# Patient Record
Sex: MALE | Race: WHITE | Employment: UNEMPLOYED | URBAN - METROPOLITAN AREA
[De-identification: names, ages, dates, MRNs, and addresses within clinical notes are randomized per-mention and may not be internally consistent; named-entity substitution may affect disease eponyms.]

---

## 2018-08-21 ENCOUNTER — OFFICE VISIT (OUTPATIENT)
Dept: FAMILY MEDICINE CLINIC | Age: 58
End: 2018-08-21
Payer: COMMERCIAL

## 2018-08-21 VITALS
RESPIRATION RATE: 16 BRPM | WEIGHT: 254 LBS | HEART RATE: 68 BPM | OXYGEN SATURATION: 95 % | TEMPERATURE: 98.1 F | HEIGHT: 71 IN | DIASTOLIC BLOOD PRESSURE: 62 MMHG | BODY MASS INDEX: 35.56 KG/M2 | SYSTOLIC BLOOD PRESSURE: 126 MMHG

## 2018-08-21 DIAGNOSIS — R03.0 ELEVATED BLOOD PRESSURE READING: Primary | ICD-10-CM

## 2018-08-21 DIAGNOSIS — Z12.11 SCREENING FOR COLON CANCER: ICD-10-CM

## 2018-08-21 DIAGNOSIS — E66.09 CLASS 2 OBESITY DUE TO EXCESS CALORIES WITHOUT SERIOUS COMORBIDITY WITH BODY MASS INDEX (BMI) OF 35.0 TO 35.9 IN ADULT: ICD-10-CM

## 2018-08-21 DIAGNOSIS — Z13.0 SCREENING FOR BLOOD DISEASE: ICD-10-CM

## 2018-08-21 DIAGNOSIS — K64.9 HEMORRHOIDS, UNSPECIFIED HEMORRHOID TYPE: ICD-10-CM

## 2018-08-21 PROCEDURE — 3017F COLORECTAL CA SCREEN DOC REV: CPT | Performed by: INTERNAL MEDICINE

## 2018-08-21 PROCEDURE — G8427 DOCREV CUR MEDS BY ELIG CLIN: HCPCS | Performed by: INTERNAL MEDICINE

## 2018-08-21 PROCEDURE — G8417 CALC BMI ABV UP PARAM F/U: HCPCS | Performed by: INTERNAL MEDICINE

## 2018-08-21 PROCEDURE — 1036F TOBACCO NON-USER: CPT | Performed by: INTERNAL MEDICINE

## 2018-08-21 PROCEDURE — 99203 OFFICE O/P NEW LOW 30 MIN: CPT | Performed by: INTERNAL MEDICINE

## 2018-08-21 ASSESSMENT — PATIENT HEALTH QUESTIONNAIRE - PHQ9
SUM OF ALL RESPONSES TO PHQ QUESTIONS 1-9: 0
2. FEELING DOWN, DEPRESSED OR HOPELESS: 0
1. LITTLE INTEREST OR PLEASURE IN DOING THINGS: 0
SUM OF ALL RESPONSES TO PHQ QUESTIONS 1-9: 0
SUM OF ALL RESPONSES TO PHQ9 QUESTIONS 1 & 2: 0

## 2018-08-21 ASSESSMENT — ENCOUNTER SYMPTOMS
ABDOMINAL PAIN: 0
BACK PAIN: 0
EYE PAIN: 0
SHORTNESS OF BREATH: 0

## 2018-08-25 DIAGNOSIS — Z13.0 SCREENING FOR BLOOD DISEASE: ICD-10-CM

## 2018-08-25 DIAGNOSIS — E66.09 CLASS 2 OBESITY DUE TO EXCESS CALORIES WITHOUT SERIOUS COMORBIDITY WITH BODY MASS INDEX (BMI) OF 35.0 TO 35.9 IN ADULT: ICD-10-CM

## 2018-08-25 DIAGNOSIS — K64.9 HEMORRHOIDS, UNSPECIFIED HEMORRHOID TYPE: ICD-10-CM

## 2018-08-25 DIAGNOSIS — R03.0 ELEVATED BLOOD PRESSURE READING: ICD-10-CM

## 2018-08-25 LAB
ALBUMIN SERPL-MCNC: 4.2 G/DL (ref 3.9–4.9)
ALP BLD-CCNC: 82 U/L (ref 35–104)
ALT SERPL-CCNC: 26 U/L (ref 0–41)
ANION GAP SERPL CALCULATED.3IONS-SCNC: 14 MEQ/L (ref 7–13)
AST SERPL-CCNC: 27 U/L (ref 0–40)
BILIRUB SERPL-MCNC: 0.4 MG/DL (ref 0–1.2)
BUN BLDV-MCNC: 12 MG/DL (ref 6–20)
CALCIUM SERPL-MCNC: 9 MG/DL (ref 8.6–10.2)
CHLORIDE BLD-SCNC: 103 MEQ/L (ref 98–107)
CHOLESTEROL, TOTAL: 265 MG/DL (ref 0–199)
CO2: 24 MEQ/L (ref 22–29)
CREAT SERPL-MCNC: 0.98 MG/DL (ref 0.7–1.2)
GFR AFRICAN AMERICAN: >60
GFR NON-AFRICAN AMERICAN: >60
GLOBULIN: 2.6 G/DL (ref 2.3–3.5)
GLUCOSE BLD-MCNC: 105 MG/DL (ref 74–109)
HBA1C MFR BLD: 5.6 % (ref 4.8–5.9)
HCT VFR BLD CALC: 42.9 % (ref 42–52)
HDLC SERPL-MCNC: 44 MG/DL (ref 40–59)
HEMOGLOBIN: 14.5 G/DL (ref 14–18)
HEPATITIS C ANTIBODY INTERPRETATION: NORMAL
LDL CHOLESTEROL CALCULATED: 186 MG/DL (ref 0–129)
MCH RBC QN AUTO: 29.5 PG (ref 27–31.3)
MCHC RBC AUTO-ENTMCNC: 33.7 % (ref 33–37)
MCV RBC AUTO: 87.6 FL (ref 80–100)
PDW BLD-RTO: 14.7 % (ref 11.5–14.5)
PLATELET # BLD: 242 K/UL (ref 130–400)
POTASSIUM SERPL-SCNC: 4 MEQ/L (ref 3.5–5.1)
RBC # BLD: 4.9 M/UL (ref 4.7–6.1)
SODIUM BLD-SCNC: 141 MEQ/L (ref 132–144)
TOTAL PROTEIN: 6.8 G/DL (ref 6.4–8.1)
TRIGL SERPL-MCNC: 176 MG/DL (ref 0–200)
WBC # BLD: 5.2 K/UL (ref 4.8–10.8)

## 2021-01-01 ENCOUNTER — APPOINTMENT (OUTPATIENT)
Dept: GENERAL RADIOLOGY | Age: 61
DRG: 870 | End: 2021-01-01
Payer: COMMERCIAL

## 2021-01-01 ENCOUNTER — APPOINTMENT (OUTPATIENT)
Dept: CT IMAGING | Age: 61
DRG: 870 | End: 2021-01-01
Payer: COMMERCIAL

## 2021-01-01 ENCOUNTER — APPOINTMENT (OUTPATIENT)
Dept: ULTRASOUND IMAGING | Age: 61
DRG: 870 | End: 2021-01-01
Payer: COMMERCIAL

## 2021-01-01 ENCOUNTER — HOSPITAL ENCOUNTER (INPATIENT)
Age: 61
LOS: 11 days | DRG: 870 | End: 2022-01-02
Attending: INTERNAL MEDICINE | Admitting: INTERNAL MEDICINE
Payer: COMMERCIAL

## 2021-01-01 ENCOUNTER — APPOINTMENT (OUTPATIENT)
Dept: INTERVENTIONAL RADIOLOGY/VASCULAR | Age: 61
DRG: 870 | End: 2021-01-01
Payer: COMMERCIAL

## 2021-01-01 DIAGNOSIS — U07.1 ACUTE HYPOXEMIC RESPIRATORY FAILURE DUE TO COVID-19 (HCC): Primary | ICD-10-CM

## 2021-01-01 DIAGNOSIS — J96.01 ACUTE HYPOXEMIC RESPIRATORY FAILURE DUE TO COVID-19 (HCC): Primary | ICD-10-CM

## 2021-01-01 LAB
ALBUMIN SERPL-MCNC: 2.6 G/DL (ref 3.5–4.6)
ALBUMIN SERPL-MCNC: 2.7 G/DL (ref 3.5–4.6)
ALBUMIN SERPL-MCNC: 2.7 G/DL (ref 3.5–4.6)
ALBUMIN SERPL-MCNC: 2.8 G/DL (ref 3.5–4.6)
ALBUMIN SERPL-MCNC: 2.9 G/DL (ref 3.5–4.6)
ALBUMIN SERPL-MCNC: 3.1 G/DL (ref 3.5–4.6)
ALP BLD-CCNC: 115 U/L (ref 35–104)
ALP BLD-CCNC: 120 U/L (ref 35–104)
ALP BLD-CCNC: 125 U/L (ref 35–104)
ALP BLD-CCNC: 127 U/L (ref 35–104)
ALP BLD-CCNC: 139 U/L (ref 35–104)
ALP BLD-CCNC: 146 U/L (ref 35–104)
ALP BLD-CCNC: 70 U/L (ref 35–104)
ALP BLD-CCNC: 81 U/L (ref 35–104)
ALP BLD-CCNC: 85 U/L (ref 35–104)
ALP BLD-CCNC: 86 U/L (ref 35–104)
ALT SERPL-CCNC: 105 U/L (ref 0–41)
ALT SERPL-CCNC: 131 U/L (ref 0–41)
ALT SERPL-CCNC: 140 U/L (ref 0–41)
ALT SERPL-CCNC: 159 U/L (ref 0–41)
ALT SERPL-CCNC: 166 U/L (ref 0–41)
ALT SERPL-CCNC: 34 U/L (ref 0–41)
ALT SERPL-CCNC: 41 U/L (ref 0–41)
ALT SERPL-CCNC: 49 U/L (ref 0–41)
ALT SERPL-CCNC: 56 U/L (ref 0–41)
ALT SERPL-CCNC: 71 U/L (ref 0–41)
ANION GAP SERPL CALCULATED.3IONS-SCNC: 15 MEQ/L (ref 9–15)
ANION GAP SERPL CALCULATED.3IONS-SCNC: 17 MEQ/L (ref 9–15)
ANION GAP SERPL CALCULATED.3IONS-SCNC: 20 MEQ/L (ref 9–15)
ANION GAP SERPL CALCULATED.3IONS-SCNC: 20 MEQ/L (ref 9–15)
ANION GAP SERPL CALCULATED.3IONS-SCNC: 21 MEQ/L (ref 9–15)
ANION GAP SERPL CALCULATED.3IONS-SCNC: 24 MEQ/L (ref 9–15)
ANION GAP SERPL CALCULATED.3IONS-SCNC: 25 MEQ/L (ref 9–15)
ANION GAP SERPL CALCULATED.3IONS-SCNC: 26 MEQ/L (ref 9–15)
ANION GAP SERPL CALCULATED.3IONS-SCNC: 26 MEQ/L (ref 9–15)
ANION GAP SERPL CALCULATED.3IONS-SCNC: 27 MEQ/L (ref 9–15)
ANION GAP SERPL CALCULATED.3IONS-SCNC: 27 MEQ/L (ref 9–15)
ANISOCYTOSIS: ABNORMAL
ANTI-XA UNFRAC HEPARIN: 0.19 IU/ML
ANTI-XA UNFRAC HEPARIN: 0.21 IU/ML
ANTI-XA UNFRAC HEPARIN: 0.22 IU/ML
ANTI-XA UNFRAC HEPARIN: 0.27 IU/ML
ANTI-XA UNFRAC HEPARIN: 0.31 IU/ML
ANTI-XA UNFRAC HEPARIN: 0.4 IU/ML
ANTI-XA UNFRAC HEPARIN: 0.55 IU/ML
ANTI-XA UNFRAC HEPARIN: 0.59 IU/ML
APTT: 32.2 SEC (ref 24.4–36.8)
APTT: 63.1 SEC (ref 24.4–36.8)
AST SERPL-CCNC: 107 U/L (ref 0–40)
AST SERPL-CCNC: 157 U/L (ref 0–40)
AST SERPL-CCNC: 208 U/L (ref 0–40)
AST SERPL-CCNC: 242 U/L (ref 0–40)
AST SERPL-CCNC: 366 U/L (ref 0–40)
AST SERPL-CCNC: 38 U/L (ref 0–40)
AST SERPL-CCNC: 44 U/L (ref 0–40)
AST SERPL-CCNC: 473 U/L (ref 0–40)
AST SERPL-CCNC: 56 U/L (ref 0–40)
AST SERPL-CCNC: <5 U/L (ref 0–40)
BANDED NEUTROPHILS RELATIVE PERCENT: 3 %
BANDED NEUTROPHILS RELATIVE PERCENT: 4 %
BANDED NEUTROPHILS RELATIVE PERCENT: 5 %
BASE EXCESS ARTERIAL: -10 (ref -3–3)
BASE EXCESS ARTERIAL: -11 (ref -3–3)
BASE EXCESS ARTERIAL: -11 (ref -3–3)
BASE EXCESS ARTERIAL: -2 (ref -3–3)
BASE EXCESS ARTERIAL: -3 (ref -3–3)
BASE EXCESS ARTERIAL: -4 (ref -3–3)
BASE EXCESS ARTERIAL: -4 (ref -3–3)
BASE EXCESS ARTERIAL: -5 (ref -3–3)
BASE EXCESS ARTERIAL: -6 (ref -3–3)
BASE EXCESS ARTERIAL: -7 (ref -3–3)
BASE EXCESS ARTERIAL: -8 (ref -3–3)
BASE EXCESS ARTERIAL: -9 (ref -3–3)
BASOPHILS ABSOLUTE: 0 K/UL (ref 0–0.2)
BASOPHILS ABSOLUTE: 0.1 K/UL (ref 0–0.2)
BASOPHILS RELATIVE PERCENT: 0.1 %
BASOPHILS RELATIVE PERCENT: 0.2 %
BASOPHILS RELATIVE PERCENT: 0.3 %
BASOPHILS RELATIVE PERCENT: 0.3 %
BASOPHILS RELATIVE PERCENT: 0.5 %
BILIRUB SERPL-MCNC: 0.3 MG/DL (ref 0.2–0.7)
BILIRUB SERPL-MCNC: 0.4 MG/DL (ref 0.2–0.7)
BILIRUB SERPL-MCNC: 0.5 MG/DL (ref 0.2–0.7)
BILIRUB SERPL-MCNC: <0.2 MG/DL (ref 0.2–0.7)
BLOOD CULTURE, ROUTINE: NORMAL
BLOOD CULTURE, ROUTINE: NORMAL
BUN BLDV-MCNC: 110 MG/DL (ref 8–23)
BUN BLDV-MCNC: 140 MG/DL (ref 8–23)
BUN BLDV-MCNC: 150 MG/DL (ref 8–23)
BUN BLDV-MCNC: 29 MG/DL (ref 8–23)
BUN BLDV-MCNC: 44 MG/DL (ref 8–23)
BUN BLDV-MCNC: 46 MG/DL (ref 8–23)
BUN BLDV-MCNC: 71 MG/DL (ref 8–23)
BUN BLDV-MCNC: 77 MG/DL (ref 8–23)
BUN BLDV-MCNC: 78 MG/DL (ref 8–23)
BUN BLDV-MCNC: 90 MG/DL (ref 8–23)
BUN BLDV-MCNC: 95 MG/DL (ref 8–23)
C-REACTIVE PROTEIN: 145.8 MG/L (ref 0–5)
CALCIUM IONIZED: 0.79 MMOL/L (ref 1.12–1.32)
CALCIUM IONIZED: 0.82 MMOL/L (ref 1.12–1.32)
CALCIUM IONIZED: 0.83 MMOL/L (ref 1.12–1.32)
CALCIUM IONIZED: 0.84 MMOL/L (ref 1.12–1.32)
CALCIUM IONIZED: 0.84 MMOL/L (ref 1.12–1.32)
CALCIUM IONIZED: 0.86 MMOL/L (ref 1.12–1.32)
CALCIUM IONIZED: 0.87 MMOL/L (ref 1.12–1.32)
CALCIUM IONIZED: 0.88 MMOL/L (ref 1.12–1.32)
CALCIUM IONIZED: 0.88 MMOL/L (ref 1.12–1.32)
CALCIUM IONIZED: 0.89 MMOL/L (ref 1.12–1.32)
CALCIUM IONIZED: 0.89 MMOL/L (ref 1.12–1.32)
CALCIUM IONIZED: 0.9 MMOL/L (ref 1.12–1.32)
CALCIUM IONIZED: 0.9 MMOL/L (ref 1.12–1.32)
CALCIUM IONIZED: 0.91 MMOL/L (ref 1.12–1.32)
CALCIUM IONIZED: 0.93 MMOL/L (ref 1.12–1.32)
CALCIUM IONIZED: 0.95 MMOL/L (ref 1.12–1.32)
CALCIUM IONIZED: 0.95 MMOL/L (ref 1.12–1.32)
CALCIUM IONIZED: 0.96 MMOL/L (ref 1.12–1.32)
CALCIUM IONIZED: 0.96 MMOL/L (ref 1.12–1.32)
CALCIUM IONIZED: 0.97 MMOL/L (ref 1.12–1.32)
CALCIUM IONIZED: 0.98 MMOL/L (ref 1.12–1.32)
CALCIUM IONIZED: 0.99 MMOL/L (ref 1.12–1.32)
CALCIUM IONIZED: 1.02 MMOL/L (ref 1.12–1.32)
CALCIUM IONIZED: 1.05 MMOL/L (ref 1.12–1.32)
CALCIUM IONIZED: 1.07 MMOL/L (ref 1.12–1.32)
CALCIUM IONIZED: 1.07 MMOL/L (ref 1.12–1.32)
CALCIUM IONIZED: 1.11 MMOL/L (ref 1.12–1.32)
CALCIUM SERPL-MCNC: 6.5 MG/DL (ref 8.5–9.9)
CALCIUM SERPL-MCNC: 6.6 MG/DL (ref 8.5–9.9)
CALCIUM SERPL-MCNC: 6.7 MG/DL (ref 8.5–9.9)
CALCIUM SERPL-MCNC: 7.2 MG/DL (ref 8.5–9.9)
CALCIUM SERPL-MCNC: 7.4 MG/DL (ref 8.5–9.9)
CALCIUM SERPL-MCNC: 7.8 MG/DL (ref 8.5–9.9)
CALCIUM SERPL-MCNC: 7.9 MG/DL (ref 8.5–9.9)
CALCIUM SERPL-MCNC: 8.1 MG/DL (ref 8.5–9.9)
CALCIUM SERPL-MCNC: 8.4 MG/DL (ref 8.5–9.9)
CHLORIDE BLD-SCNC: 101 MEQ/L (ref 95–107)
CHLORIDE BLD-SCNC: 102 MEQ/L (ref 95–107)
CHLORIDE BLD-SCNC: 103 MEQ/L (ref 95–107)
CHLORIDE BLD-SCNC: 89 MEQ/L (ref 95–107)
CHLORIDE BLD-SCNC: 90 MEQ/L (ref 95–107)
CHLORIDE BLD-SCNC: 91 MEQ/L (ref 95–107)
CHLORIDE BLD-SCNC: 92 MEQ/L (ref 95–107)
CHLORIDE BLD-SCNC: 95 MEQ/L (ref 95–107)
CHLORIDE BLD-SCNC: 95 MEQ/L (ref 95–107)
CHLORIDE BLD-SCNC: 96 MEQ/L (ref 95–107)
CHLORIDE BLD-SCNC: 96 MEQ/L (ref 95–107)
CO2: 13 MEQ/L (ref 20–31)
CO2: 15 MEQ/L (ref 20–31)
CO2: 16 MEQ/L (ref 20–31)
CO2: 17 MEQ/L (ref 20–31)
CO2: 18 MEQ/L (ref 20–31)
CO2: 19 MEQ/L (ref 20–31)
CO2: 20 MEQ/L (ref 20–31)
CO2: 21 MEQ/L (ref 20–31)
CREAT SERPL-MCNC: 10.12 MG/DL (ref 0.7–1.2)
CREAT SERPL-MCNC: 11.03 MG/DL (ref 0.7–1.2)
CREAT SERPL-MCNC: 2.09 MG/DL (ref 0.7–1.2)
CREAT SERPL-MCNC: 2.96 MG/DL (ref 0.7–1.2)
CREAT SERPL-MCNC: 3.14 MG/DL (ref 0.7–1.2)
CREAT SERPL-MCNC: 5.21 MG/DL (ref 0.7–1.2)
CREAT SERPL-MCNC: 6.2 MG/DL (ref 0.7–1.2)
CREAT SERPL-MCNC: 6.6 MG/DL (ref 0.7–1.2)
CREAT SERPL-MCNC: 8.03 MG/DL (ref 0.7–1.2)
CREAT SERPL-MCNC: 8.24 MG/DL (ref 0.7–1.2)
CREAT SERPL-MCNC: 9.76 MG/DL (ref 0.7–1.2)
CULTURE, BLOOD 2: NORMAL
CULTURE, BLOOD 2: NORMAL
D DIMER: 10.1 MG/L FEU (ref 0–0.5)
D DIMER: 10.67 MG/L FEU (ref 0–0.5)
D DIMER: 10.75 MG/L FEU (ref 0–0.5)
D DIMER: 12.48 MG/L FEU (ref 0–0.5)
D DIMER: 2.41 MG/L FEU (ref 0–0.5)
D DIMER: 3.07 MG/L FEU (ref 0–0.5)
D DIMER: 3.62 MG/L FEU (ref 0–0.5)
D DIMER: 3.73 MG/L FEU (ref 0–0.5)
D DIMER: 8.51 MG/L FEU (ref 0–0.5)
EKG ATRIAL RATE: 113 BPM
EKG ATRIAL RATE: 75 BPM
EKG P AXIS: 38 DEGREES
EKG P AXIS: 46 DEGREES
EKG P-R INTERVAL: 180 MS
EKG P-R INTERVAL: 200 MS
EKG Q-T INTERVAL: 316 MS
EKG Q-T INTERVAL: 412 MS
EKG QRS DURATION: 80 MS
EKG QRS DURATION: 90 MS
EKG QTC CALCULATION (BAZETT): 433 MS
EKG QTC CALCULATION (BAZETT): 460 MS
EKG R AXIS: 33 DEGREES
EKG R AXIS: 71 DEGREES
EKG T AXIS: 103 DEGREES
EKG T AXIS: 31 DEGREES
EKG VENTRICULAR RATE: 113 BPM
EKG VENTRICULAR RATE: 75 BPM
EOSINOPHILS ABSOLUTE: 0 K/UL (ref 0–0.7)
EOSINOPHILS RELATIVE PERCENT: 0 %
EOSINOPHILS RELATIVE PERCENT: 0.1 %
EOSINOPHILS RELATIVE PERCENT: 0.2 %
FERRITIN: 1358 UG/L (ref 30–400)
FERRITIN: 1783 UG/L (ref 30–400)
FERRITIN: 2454 UG/L (ref 30–400)
FERRITIN: 4229 UG/L (ref 30–400)
FERRITIN: 6689 UG/L (ref 30–400)
FERRITIN: 679 UG/L (ref 30–400)
FERRITIN: ABNORMAL UG/L (ref 30–400)
GFR AFRICAN AMERICAN: 10
GFR AFRICAN AMERICAN: 10.4
GFR AFRICAN AMERICAN: 11
GFR AFRICAN AMERICAN: 11
GFR AFRICAN AMERICAN: 11.2
GFR AFRICAN AMERICAN: 13
GFR AFRICAN AMERICAN: 13.7
GFR AFRICAN AMERICAN: 17
GFR AFRICAN AMERICAN: 20
GFR AFRICAN AMERICAN: 24.5
GFR AFRICAN AMERICAN: 26.3
GFR AFRICAN AMERICAN: 28
GFR AFRICAN AMERICAN: 33
GFR AFRICAN AMERICAN: 39.2
GFR AFRICAN AMERICAN: 44
GFR AFRICAN AMERICAN: 5
GFR AFRICAN AMERICAN: 5.8
GFR AFRICAN AMERICAN: 6
GFR AFRICAN AMERICAN: 6.4
GFR AFRICAN AMERICAN: 6.6
GFR AFRICAN AMERICAN: 7
GFR AFRICAN AMERICAN: 8.1
GFR AFRICAN AMERICAN: 8.3
GFR AFRICAN AMERICAN: 9
GFR NON-AFRICAN AMERICAN: 11
GFR NON-AFRICAN AMERICAN: 11.3
GFR NON-AFRICAN AMERICAN: 14
GFR NON-AFRICAN AMERICAN: 17
GFR NON-AFRICAN AMERICAN: 20.3
GFR NON-AFRICAN AMERICAN: 21.7
GFR NON-AFRICAN AMERICAN: 23
GFR NON-AFRICAN AMERICAN: 28
GFR NON-AFRICAN AMERICAN: 32.4
GFR NON-AFRICAN AMERICAN: 36
GFR NON-AFRICAN AMERICAN: 4
GFR NON-AFRICAN AMERICAN: 4.8
GFR NON-AFRICAN AMERICAN: 5
GFR NON-AFRICAN AMERICAN: 5.3
GFR NON-AFRICAN AMERICAN: 5.5
GFR NON-AFRICAN AMERICAN: 6
GFR NON-AFRICAN AMERICAN: 6.7
GFR NON-AFRICAN AMERICAN: 6.9
GFR NON-AFRICAN AMERICAN: 8
GFR NON-AFRICAN AMERICAN: 8.6
GFR NON-AFRICAN AMERICAN: 9
GFR NON-AFRICAN AMERICAN: 9.2
GLOBULIN: 2.7 G/DL (ref 2.3–3.5)
GLOBULIN: 2.9 G/DL (ref 2.3–3.5)
GLOBULIN: 2.9 G/DL (ref 2.3–3.5)
GLOBULIN: 3 G/DL (ref 2.3–3.5)
GLOBULIN: 3 G/DL (ref 2.3–3.5)
GLOBULIN: 3.1 G/DL (ref 2.3–3.5)
GLOBULIN: 3.4 G/DL (ref 2.3–3.5)
GLOBULIN: 3.6 G/DL (ref 2.3–3.5)
GLOBULIN: 3.6 G/DL (ref 2.3–3.5)
GLOBULIN: 4.3 G/DL (ref 2.3–3.5)
GLUCOSE BLD-MCNC: 102 MG/DL (ref 60–115)
GLUCOSE BLD-MCNC: 102 MG/DL (ref 60–115)
GLUCOSE BLD-MCNC: 104 MG/DL (ref 60–115)
GLUCOSE BLD-MCNC: 106 MG/DL (ref 60–115)
GLUCOSE BLD-MCNC: 109 MG/DL (ref 60–115)
GLUCOSE BLD-MCNC: 112 MG/DL (ref 60–115)
GLUCOSE BLD-MCNC: 113 MG/DL (ref 60–115)
GLUCOSE BLD-MCNC: 115 MG/DL (ref 60–115)
GLUCOSE BLD-MCNC: 119 MG/DL (ref 70–99)
GLUCOSE BLD-MCNC: 120 MG/DL (ref 60–115)
GLUCOSE BLD-MCNC: 128 MG/DL (ref 60–115)
GLUCOSE BLD-MCNC: 132 MG/DL (ref 60–115)
GLUCOSE BLD-MCNC: 132 MG/DL (ref 60–115)
GLUCOSE BLD-MCNC: 133 MG/DL (ref 60–115)
GLUCOSE BLD-MCNC: 134 MG/DL (ref 60–115)
GLUCOSE BLD-MCNC: 135 MG/DL (ref 60–115)
GLUCOSE BLD-MCNC: 135 MG/DL (ref 60–115)
GLUCOSE BLD-MCNC: 135 MG/DL (ref 70–99)
GLUCOSE BLD-MCNC: 137 MG/DL (ref 60–115)
GLUCOSE BLD-MCNC: 139 MG/DL (ref 70–99)
GLUCOSE BLD-MCNC: 140 MG/DL (ref 60–115)
GLUCOSE BLD-MCNC: 143 MG/DL (ref 70–99)
GLUCOSE BLD-MCNC: 145 MG/DL (ref 60–115)
GLUCOSE BLD-MCNC: 147 MG/DL (ref 60–115)
GLUCOSE BLD-MCNC: 148 MG/DL (ref 60–115)
GLUCOSE BLD-MCNC: 152 MG/DL (ref 60–115)
GLUCOSE BLD-MCNC: 153 MG/DL (ref 60–115)
GLUCOSE BLD-MCNC: 153 MG/DL (ref 60–115)
GLUCOSE BLD-MCNC: 154 MG/DL (ref 60–115)
GLUCOSE BLD-MCNC: 156 MG/DL (ref 60–115)
GLUCOSE BLD-MCNC: 157 MG/DL (ref 60–115)
GLUCOSE BLD-MCNC: 157 MG/DL (ref 70–99)
GLUCOSE BLD-MCNC: 158 MG/DL (ref 60–115)
GLUCOSE BLD-MCNC: 159 MG/DL (ref 60–115)
GLUCOSE BLD-MCNC: 160 MG/DL (ref 60–115)
GLUCOSE BLD-MCNC: 161 MG/DL (ref 60–115)
GLUCOSE BLD-MCNC: 166 MG/DL (ref 60–115)
GLUCOSE BLD-MCNC: 167 MG/DL (ref 60–115)
GLUCOSE BLD-MCNC: 167 MG/DL (ref 60–115)
GLUCOSE BLD-MCNC: 168 MG/DL (ref 60–115)
GLUCOSE BLD-MCNC: 171 MG/DL (ref 70–99)
GLUCOSE BLD-MCNC: 174 MG/DL (ref 60–115)
GLUCOSE BLD-MCNC: 176 MG/DL (ref 60–115)
GLUCOSE BLD-MCNC: 176 MG/DL (ref 60–115)
GLUCOSE BLD-MCNC: 177 MG/DL (ref 60–115)
GLUCOSE BLD-MCNC: 177 MG/DL (ref 70–99)
GLUCOSE BLD-MCNC: 180 MG/DL (ref 60–115)
GLUCOSE BLD-MCNC: 181 MG/DL (ref 60–115)
GLUCOSE BLD-MCNC: 183 MG/DL (ref 70–99)
GLUCOSE BLD-MCNC: 184 MG/DL (ref 60–115)
GLUCOSE BLD-MCNC: 186 MG/DL (ref 60–115)
GLUCOSE BLD-MCNC: 187 MG/DL (ref 60–115)
GLUCOSE BLD-MCNC: 192 MG/DL (ref 60–115)
GLUCOSE BLD-MCNC: 193 MG/DL (ref 60–115)
GLUCOSE BLD-MCNC: 203 MG/DL (ref 70–99)
GLUCOSE BLD-MCNC: 210 MG/DL (ref 60–115)
GLUCOSE BLD-MCNC: 219 MG/DL (ref 60–115)
GLUCOSE BLD-MCNC: 221 MG/DL (ref 60–115)
GLUCOSE BLD-MCNC: 221 MG/DL (ref 60–115)
GLUCOSE BLD-MCNC: 223 MG/DL (ref 60–115)
GLUCOSE BLD-MCNC: 223 MG/DL (ref 60–115)
GLUCOSE BLD-MCNC: 223 MG/DL (ref 70–99)
GLUCOSE BLD-MCNC: 227 MG/DL (ref 70–99)
GLUCOSE BLD-MCNC: 231 MG/DL (ref 60–115)
GLUCOSE BLD-MCNC: 234 MG/DL (ref 60–115)
GLUCOSE BLD-MCNC: 242 MG/DL (ref 60–115)
GLUCOSE BLD-MCNC: 263 MG/DL (ref 60–115)
GLUCOSE BLD-MCNC: 91 MG/DL (ref 60–115)
HAV IGM SER IA-ACNC: NONREACTIVE
HBA1C MFR BLD: 6.4 % (ref 4.8–5.9)
HCO3 ARTERIAL: 17.3 MMOL/L (ref 21–29)
HCO3 ARTERIAL: 18 MMOL/L (ref 21–29)
HCO3 ARTERIAL: 18.2 MMOL/L (ref 21–29)
HCO3 ARTERIAL: 18.3 MMOL/L (ref 21–29)
HCO3 ARTERIAL: 18.3 MMOL/L (ref 21–29)
HCO3 ARTERIAL: 19 MMOL/L (ref 21–29)
HCO3 ARTERIAL: 19.6 MMOL/L (ref 21–29)
HCO3 ARTERIAL: 19.8 MMOL/L (ref 21–29)
HCO3 ARTERIAL: 20.2 MMOL/L (ref 21–29)
HCO3 ARTERIAL: 20.3 MMOL/L (ref 21–29)
HCO3 ARTERIAL: 21 MMOL/L (ref 21–29)
HCO3 ARTERIAL: 21.2 MMOL/L (ref 21–29)
HCO3 ARTERIAL: 21.3 MMOL/L (ref 21–29)
HCO3 ARTERIAL: 21.4 MMOL/L (ref 21–29)
HCO3 ARTERIAL: 22 MMOL/L (ref 21–29)
HCO3 ARTERIAL: 22.1 MMOL/L (ref 21–29)
HCO3 ARTERIAL: 22.2 MMOL/L (ref 21–29)
HCO3 ARTERIAL: 22.4 MMOL/L (ref 21–29)
HCO3 ARTERIAL: 23.5 MMOL/L (ref 21–29)
HCO3 ARTERIAL: 23.6 MMOL/L (ref 21–29)
HCO3 ARTERIAL: 23.8 MMOL/L (ref 21–29)
HCO3 ARTERIAL: 24.7 MMOL/L (ref 21–29)
HCO3 ARTERIAL: 24.9 MMOL/L (ref 21–29)
HCO3 ARTERIAL: 25.6 MMOL/L (ref 21–29)
HCT VFR BLD CALC: 25.5 % (ref 42–52)
HCT VFR BLD CALC: 26 % (ref 42–52)
HCT VFR BLD CALC: 30.3 % (ref 42–52)
HCT VFR BLD CALC: 31.9 % (ref 42–52)
HCT VFR BLD CALC: 32.1 % (ref 42–52)
HCT VFR BLD CALC: 33.7 % (ref 42–52)
HCT VFR BLD CALC: 34.9 % (ref 42–52)
HCT VFR BLD CALC: 36.2 % (ref 42–52)
HCT VFR BLD CALC: 37.6 % (ref 42–52)
HCT VFR BLD CALC: 38.6 % (ref 42–52)
HCT VFR BLD CALC: 38.6 % (ref 42–52)
HCT VFR BLD CALC: 43.6 % (ref 42–52)
HEMOGLOBIN: 10.5 GM/DL (ref 13.5–17.5)
HEMOGLOBIN: 10.6 G/DL (ref 14–18)
HEMOGLOBIN: 10.7 GM/DL (ref 13.5–17.5)
HEMOGLOBIN: 10.7 GM/DL (ref 13.5–17.5)
HEMOGLOBIN: 10.9 GM/DL (ref 13.5–17.5)
HEMOGLOBIN: 11 GM/DL (ref 13.5–17.5)
HEMOGLOBIN: 11.1 G/DL (ref 14–18)
HEMOGLOBIN: 11.3 G/DL (ref 14–18)
HEMOGLOBIN: 11.3 GM/DL (ref 13.5–17.5)
HEMOGLOBIN: 11.4 G/DL (ref 14–18)
HEMOGLOBIN: 11.4 GM/DL (ref 13.5–17.5)
HEMOGLOBIN: 11.4 GM/DL (ref 13.5–17.5)
HEMOGLOBIN: 11.6 GM/DL (ref 13.5–17.5)
HEMOGLOBIN: 11.7 GM/DL (ref 13.5–17.5)
HEMOGLOBIN: 11.8 GM/DL (ref 13.5–17.5)
HEMOGLOBIN: 12.4 G/DL (ref 14–18)
HEMOGLOBIN: 12.4 GM/DL (ref 13.5–17.5)
HEMOGLOBIN: 12.5 G/DL (ref 14–18)
HEMOGLOBIN: 12.6 G/DL (ref 14–18)
HEMOGLOBIN: 12.6 G/DL (ref 14–18)
HEMOGLOBIN: 12.8 G/DL (ref 14–18)
HEMOGLOBIN: 13.1 GM/DL (ref 13.5–17.5)
HEMOGLOBIN: 13.2 GM/DL (ref 13.5–17.5)
HEMOGLOBIN: 13.4 GM/DL (ref 13.5–17.5)
HEMOGLOBIN: 13.5 GM/DL (ref 13.5–17.5)
HEMOGLOBIN: 14 GM/DL (ref 13.5–17.5)
HEMOGLOBIN: 14.1 G/DL (ref 14–18)
HEMOGLOBIN: 14.2 GM/DL (ref 13.5–17.5)
HEMOGLOBIN: 15.3 GM/DL (ref 13.5–17.5)
HEMOGLOBIN: 7.8 GM/DL (ref 13.5–17.5)
HEMOGLOBIN: 8.1 GM/DL (ref 13.5–17.5)
HEMOGLOBIN: 8.2 GM/DL (ref 13.5–17.5)
HEMOGLOBIN: 8.3 GM/DL (ref 13.5–17.5)
HEMOGLOBIN: 8.6 G/DL (ref 14–18)
HEMOGLOBIN: 8.7 G/DL (ref 14–18)
HEMOGLOBIN: 8.8 GM/DL (ref 13.5–17.5)
HEMOGLOBIN: 9 GM/DL (ref 13.5–17.5)
HEMOGLOBIN: 9.4 GM/DL (ref 13.5–17.5)
HEMOGLOBIN: 9.8 GM/DL (ref 13.5–17.5)
HEPATITIS B CORE IGM ANTIBODY: NONREACTIVE
HEPATITIS B SURFACE ANTIGEN: NONREACTIVE
HEPATITIS C ANTIBODY: NONREACTIVE
INR BLD: 1.1
INR BLD: 1.1
LACTATE: 0.71 MMOL/L (ref 0.4–2)
LACTATE: 0.73 MMOL/L (ref 0.4–2)
LACTATE: 0.76 MMOL/L (ref 0.4–2)
LACTATE: 0.76 MMOL/L (ref 0.4–2)
LACTATE: 0.78 MMOL/L (ref 0.4–2)
LACTATE: 0.79 MMOL/L (ref 0.4–2)
LACTATE: 0.84 MMOL/L (ref 0.4–2)
LACTATE: 0.85 MMOL/L (ref 0.4–2)
LACTATE: 0.9 MMOL/L (ref 0.4–2)
LACTATE: 0.91 MMOL/L (ref 0.4–2)
LACTATE: 0.92 MMOL/L (ref 0.4–2)
LACTATE: 0.93 MMOL/L (ref 0.4–2)
LACTATE: 1.02 MMOL/L (ref 0.4–2)
LACTATE: 1.02 MMOL/L (ref 0.4–2)
LACTATE: 1.05 MMOL/L (ref 0.4–2)
LACTATE: 1.07 MMOL/L (ref 0.4–2)
LACTATE: 1.11 MMOL/L (ref 0.4–2)
LACTATE: 1.26 MMOL/L (ref 0.4–2)
LACTATE: 1.26 MMOL/L (ref 0.4–2)
LACTATE: 1.29 MMOL/L (ref 0.4–2)
LACTATE: 1.41 MMOL/L (ref 0.4–2)
LACTATE: 1.45 MMOL/L (ref 0.4–2)
LACTATE: 1.55 MMOL/L (ref 0.4–2)
LACTATE: 1.8 MMOL/L (ref 0.4–2)
LACTATE: 3.08 MMOL/L (ref 0.4–2)
LACTATE: 3.63 MMOL/L (ref 0.4–2)
LACTATE: 9 MMOL/L (ref 0.4–2)
LACTIC ACID: 10.9 MMOL/L (ref 0.5–2.2)
LYMPHOCYTES ABSOLUTE: 0.1 K/UL (ref 1–4.8)
LYMPHOCYTES ABSOLUTE: 0.2 K/UL (ref 1–4.8)
LYMPHOCYTES ABSOLUTE: 0.2 K/UL (ref 1–4.8)
LYMPHOCYTES ABSOLUTE: 0.6 K/UL (ref 1–4.8)
LYMPHOCYTES ABSOLUTE: 0.6 K/UL (ref 1–4.8)
LYMPHOCYTES ABSOLUTE: 0.8 K/UL (ref 1–4.8)
LYMPHOCYTES ABSOLUTE: 0.9 K/UL (ref 1–4.8)
LYMPHOCYTES ABSOLUTE: 1 K/UL (ref 1–4.8)
LYMPHOCYTES ABSOLUTE: 1 K/UL (ref 1–4.8)
LYMPHOCYTES ABSOLUTE: 1.7 K/UL (ref 1–4.8)
LYMPHOCYTES RELATIVE PERCENT: 1 %
LYMPHOCYTES RELATIVE PERCENT: 15.5 %
LYMPHOCYTES RELATIVE PERCENT: 2.9 %
LYMPHOCYTES RELATIVE PERCENT: 3.4 %
LYMPHOCYTES RELATIVE PERCENT: 5.7 %
LYMPHOCYTES RELATIVE PERCENT: 6.1 %
LYMPHOCYTES RELATIVE PERCENT: 7.9 %
LYMPHOCYTES RELATIVE PERCENT: 9.3 %
MCH RBC QN AUTO: 27.7 PG (ref 27–31.3)
MCH RBC QN AUTO: 28 PG (ref 27–31.3)
MCH RBC QN AUTO: 28.1 PG (ref 27–31.3)
MCH RBC QN AUTO: 28.1 PG (ref 27–31.3)
MCH RBC QN AUTO: 28.3 PG (ref 27–31.3)
MCH RBC QN AUTO: 28.6 PG (ref 27–31.3)
MCH RBC QN AUTO: 29 PG (ref 27–31.3)
MCH RBC QN AUTO: 29.4 PG (ref 27–31.3)
MCH RBC QN AUTO: 29.8 PG (ref 27–31.3)
MCH RBC QN AUTO: 29.9 PG (ref 27–31.3)
MCH RBC QN AUTO: 30.4 PG (ref 27–31.3)
MCH RBC QN AUTO: 31.2 PG (ref 27–31.3)
MCHC RBC AUTO-ENTMCNC: 32.2 % (ref 33–37)
MCHC RBC AUTO-ENTMCNC: 32.5 % (ref 33–37)
MCHC RBC AUTO-ENTMCNC: 32.7 % (ref 33–37)
MCHC RBC AUTO-ENTMCNC: 32.9 % (ref 33–37)
MCHC RBC AUTO-ENTMCNC: 33 % (ref 33–37)
MCHC RBC AUTO-ENTMCNC: 33.3 % (ref 33–37)
MCHC RBC AUTO-ENTMCNC: 34 % (ref 33–37)
MCHC RBC AUTO-ENTMCNC: 34.4 % (ref 33–37)
MCHC RBC AUTO-ENTMCNC: 34.9 % (ref 33–37)
MCHC RBC AUTO-ENTMCNC: 35 % (ref 33–37)
MCHC RBC AUTO-ENTMCNC: 35.3 % (ref 33–37)
MCHC RBC AUTO-ENTMCNC: 37 % (ref 33–37)
MCV RBC AUTO: 84.2 FL (ref 80–100)
MCV RBC AUTO: 84.3 FL (ref 80–100)
MCV RBC AUTO: 84.8 FL (ref 80–100)
MCV RBC AUTO: 84.9 FL (ref 80–100)
MCV RBC AUTO: 85 FL (ref 80–100)
MCV RBC AUTO: 85.3 FL (ref 80–100)
MCV RBC AUTO: 85.4 FL (ref 80–100)
MCV RBC AUTO: 85.5 FL (ref 80–100)
MCV RBC AUTO: 86.9 FL (ref 80–100)
MCV RBC AUTO: 87.1 FL (ref 80–100)
METAMYELOCYTES RELATIVE PERCENT: 2 %
METAMYELOCYTES RELATIVE PERCENT: 3 %
METAMYELOCYTES RELATIVE PERCENT: 4 %
MONOCYTES ABSOLUTE: 0.1 K/UL (ref 0.2–0.8)
MONOCYTES ABSOLUTE: 0.4 K/UL (ref 0.2–0.8)
MONOCYTES ABSOLUTE: 0.4 K/UL (ref 0.2–0.8)
MONOCYTES ABSOLUTE: 0.5 K/UL (ref 0.2–0.8)
MONOCYTES ABSOLUTE: 0.5 K/UL (ref 0.2–0.8)
MONOCYTES ABSOLUTE: 0.6 K/UL (ref 0.2–0.8)
MONOCYTES ABSOLUTE: 0.7 K/UL (ref 0.2–0.8)
MONOCYTES ABSOLUTE: 1 K/UL (ref 0.2–0.8)
MONOCYTES RELATIVE PERCENT: 0.9 %
MONOCYTES RELATIVE PERCENT: 2.5 %
MONOCYTES RELATIVE PERCENT: 2.7 %
MONOCYTES RELATIVE PERCENT: 3.1 %
MONOCYTES RELATIVE PERCENT: 3.5 %
MONOCYTES RELATIVE PERCENT: 4.2 %
MONOCYTES RELATIVE PERCENT: 4.4 %
MONOCYTES RELATIVE PERCENT: 5.1 %
MONOCYTES RELATIVE PERCENT: 5.6 %
MONOCYTES RELATIVE PERCENT: 6.5 %
MYELOCYTE PERCENT: 1 %
MYELOCYTE PERCENT: 4 %
NEUTROPHILS ABSOLUTE: 10.6 K/UL (ref 1.4–6.5)
NEUTROPHILS ABSOLUTE: 11.4 K/UL (ref 1.4–6.5)
NEUTROPHILS ABSOLUTE: 13.8 K/UL (ref 1.4–6.5)
NEUTROPHILS ABSOLUTE: 14.6 K/UL (ref 1.4–6.5)
NEUTROPHILS ABSOLUTE: 15.8 K/UL (ref 1.4–6.5)
NEUTROPHILS ABSOLUTE: 17.1 K/UL (ref 1.4–6.5)
NEUTROPHILS ABSOLUTE: 17.2 K/UL (ref 1.4–6.5)
NEUTROPHILS ABSOLUTE: 17.9 K/UL (ref 1.4–6.5)
NEUTROPHILS ABSOLUTE: 8.4 K/UL (ref 1.4–6.5)
NEUTROPHILS ABSOLUTE: 9 K/UL (ref 1.4–6.5)
NEUTROPHILS RELATIVE PERCENT: 78.7 %
NEUTROPHILS RELATIVE PERCENT: 86.1 %
NEUTROPHILS RELATIVE PERCENT: 87 %
NEUTROPHILS RELATIVE PERCENT: 87.6 %
NEUTROPHILS RELATIVE PERCENT: 88 %
NEUTROPHILS RELATIVE PERCENT: 88 %
NEUTROPHILS RELATIVE PERCENT: 88.5 %
NEUTROPHILS RELATIVE PERCENT: 88.8 %
NEUTROPHILS RELATIVE PERCENT: 93.1 %
NEUTROPHILS RELATIVE PERCENT: 93.9 %
NUCLEATED RED BLOOD CELLS: 1 /100 WBC
NUCLEATED RED BLOOD CELLS: 3 /100 WBC
O2 SAT, ARTERIAL: 68 % (ref 93–100)
O2 SAT, ARTERIAL: 68 % (ref 93–100)
O2 SAT, ARTERIAL: 81 % (ref 93–100)
O2 SAT, ARTERIAL: 87 % (ref 93–100)
O2 SAT, ARTERIAL: 87 % (ref 93–100)
O2 SAT, ARTERIAL: 90 % (ref 93–100)
O2 SAT, ARTERIAL: 92 % (ref 93–100)
O2 SAT, ARTERIAL: 92 % (ref 93–100)
O2 SAT, ARTERIAL: 93 % (ref 93–100)
O2 SAT, ARTERIAL: 94 % (ref 93–100)
O2 SAT, ARTERIAL: 95 % (ref 93–100)
O2 SAT, ARTERIAL: 97 % (ref 93–100)
O2 SAT, ARTERIAL: 97 % (ref 93–100)
O2 SAT, ARTERIAL: 98 % (ref 93–100)
PCO2 ARTERIAL: 145 MM HG (ref 35–45)
PCO2 ARTERIAL: 36 MM HG (ref 35–45)
PCO2 ARTERIAL: 38 MM HG (ref 35–45)
PCO2 ARTERIAL: 41 MM HG (ref 35–45)
PCO2 ARTERIAL: 41 MM HG (ref 35–45)
PCO2 ARTERIAL: 43 MM HG (ref 35–45)
PCO2 ARTERIAL: 43 MM HG (ref 35–45)
PCO2 ARTERIAL: 45 MM HG (ref 35–45)
PCO2 ARTERIAL: 48 MM HG (ref 35–45)
PCO2 ARTERIAL: 49 MM HG (ref 35–45)
PCO2 ARTERIAL: 52 MM HG (ref 35–45)
PCO2 ARTERIAL: 52 MM HG (ref 35–45)
PCO2 ARTERIAL: 53 MM HG (ref 35–45)
PCO2 ARTERIAL: 55 MM HG (ref 35–45)
PCO2 ARTERIAL: 57 MM HG (ref 35–45)
PCO2 ARTERIAL: 58 MM HG (ref 35–45)
PCO2 ARTERIAL: 65 MM HG (ref 35–45)
PCO2 ARTERIAL: 71 MM HG (ref 35–45)
PCO2 ARTERIAL: 72 MM HG (ref 35–45)
PCO2 ARTERIAL: 72 MM HG (ref 35–45)
PCO2 ARTERIAL: 85 MM HG (ref 35–45)
PCO2 ARTERIAL: 94 MM HG (ref 35–45)
PCO2 ARTERIAL: 95 MM HG (ref 35–45)
PDW BLD-RTO: 15 % (ref 11.5–14.5)
PDW BLD-RTO: 15.2 % (ref 11.5–14.5)
PDW BLD-RTO: 15.3 % (ref 11.5–14.5)
PDW BLD-RTO: 15.3 % (ref 11.5–14.5)
PDW BLD-RTO: 15.4 % (ref 11.5–14.5)
PDW BLD-RTO: 15.7 % (ref 11.5–14.5)
PDW BLD-RTO: 15.8 % (ref 11.5–14.5)
PDW BLD-RTO: 15.9 % (ref 11.5–14.5)
PERFORMED ON: ABNORMAL
PERFORMED ON: NORMAL
PH ARTERIAL: 6.82 (ref 7.35–7.45)
PH ARTERIAL: 6.98 (ref 7.35–7.45)
PH ARTERIAL: 7 (ref 7.35–7.45)
PH ARTERIAL: 7.04 (ref 7.35–7.45)
PH ARTERIAL: 7.08 (ref 7.35–7.45)
PH ARTERIAL: 7.11 (ref 7.35–7.45)
PH ARTERIAL: 7.12 (ref 7.35–7.45)
PH ARTERIAL: 7.12 (ref 7.35–7.45)
PH ARTERIAL: 7.13 (ref 7.35–7.45)
PH ARTERIAL: 7.14 (ref 7.35–7.45)
PH ARTERIAL: 7.17 (ref 7.35–7.45)
PH ARTERIAL: 7.17 (ref 7.35–7.45)
PH ARTERIAL: 7.19 (ref 7.35–7.45)
PH ARTERIAL: 7.19 (ref 7.35–7.45)
PH ARTERIAL: 7.22 (ref 7.35–7.45)
PH ARTERIAL: 7.23 (ref 7.35–7.45)
PH ARTERIAL: 7.26 (ref 7.35–7.45)
PH ARTERIAL: 7.28 (ref 7.35–7.45)
PH ARTERIAL: 7.29 (ref 7.35–7.45)
PH ARTERIAL: 7.3 (ref 7.35–7.45)
PH ARTERIAL: 7.32 (ref 7.35–7.45)
PLATELET # BLD: 154 K/UL (ref 130–400)
PLATELET # BLD: 218 K/UL (ref 130–400)
PLATELET # BLD: 229 K/UL (ref 130–400)
PLATELET # BLD: 236 K/UL (ref 130–400)
PLATELET # BLD: 248 K/UL (ref 130–400)
PLATELET # BLD: 255 K/UL (ref 130–400)
PLATELET # BLD: 259 K/UL (ref 130–400)
PLATELET # BLD: 261 K/UL (ref 130–400)
PLATELET # BLD: 263 K/UL (ref 130–400)
PLATELET # BLD: 263 K/UL (ref 130–400)
PLATELET # BLD: 297 K/UL (ref 130–400)
PLATELET # BLD: 330 K/UL (ref 130–400)
PLATELET SLIDE REVIEW: NORMAL
PO2 ARTERIAL: 101 MM HG (ref 75–108)
PO2 ARTERIAL: 101 MM HG (ref 75–108)
PO2 ARTERIAL: 106 MM HG (ref 75–108)
PO2 ARTERIAL: 126 MM HG (ref 75–108)
PO2 ARTERIAL: 131 MM HG (ref 75–108)
PO2 ARTERIAL: 153 MM HG (ref 75–108)
PO2 ARTERIAL: 162 MM HG (ref 75–108)
PO2 ARTERIAL: 39 MM HG (ref 75–108)
PO2 ARTERIAL: 41 MM HG (ref 75–108)
PO2 ARTERIAL: 54 MM HG (ref 75–108)
PO2 ARTERIAL: 64 MM HG (ref 75–108)
PO2 ARTERIAL: 65 MM HG (ref 75–108)
PO2 ARTERIAL: 70 MM HG (ref 75–108)
PO2 ARTERIAL: 76 MM HG (ref 75–108)
PO2 ARTERIAL: 77 MM HG (ref 75–108)
PO2 ARTERIAL: 78 MM HG (ref 75–108)
PO2 ARTERIAL: 83 MM HG (ref 75–108)
PO2 ARTERIAL: 83 MM HG (ref 75–108)
PO2 ARTERIAL: 84 MM HG (ref 75–108)
PO2 ARTERIAL: 84 MM HG (ref 75–108)
PO2 ARTERIAL: 89 MM HG (ref 75–108)
PO2 ARTERIAL: 91 MM HG (ref 75–108)
PO2 ARTERIAL: 92 MM HG (ref 75–108)
PO2 ARTERIAL: 96 MM HG (ref 75–108)
PO2 ARTERIAL: 99 MM HG (ref 75–108)
POC CHLORIDE: 101 MEQ/L (ref 99–110)
POC CHLORIDE: 102 MEQ/L (ref 99–110)
POC CHLORIDE: 103 MEQ/L (ref 99–110)
POC CHLORIDE: 104 MEQ/L (ref 99–110)
POC CHLORIDE: 105 MEQ/L (ref 99–110)
POC CHLORIDE: 106 MEQ/L (ref 99–110)
POC CHLORIDE: 107 MEQ/L (ref 99–110)
POC CHLORIDE: 108 MEQ/L (ref 99–110)
POC CHLORIDE: 109 MEQ/L (ref 99–110)
POC CHLORIDE: 110 MEQ/L (ref 99–110)
POC CREATININE: 1.9 MG/DL (ref 0.8–1.3)
POC CREATININE: 10.2 MG/DL (ref 0.8–1.3)
POC CREATININE: 10.4 MG/DL (ref 0.8–1.3)
POC CREATININE: 10.8 MG/DL (ref 0.8–1.3)
POC CREATININE: 11 MG/DL (ref 0.8–1.3)
POC CREATININE: 11.1 MG/DL (ref 0.8–1.3)
POC CREATININE: 11.8 MG/DL (ref 0.8–1.3)
POC CREATININE: 11.8 MG/DL (ref 0.8–1.3)
POC CREATININE: 11.9 MG/DL (ref 0.8–1.3)
POC CREATININE: 13 MG/DL (ref 0.8–1.3)
POC CREATININE: 13.6 MG/DL (ref 0.8–1.3)
POC CREATININE: 2.4 MG/DL (ref 0.8–1.3)
POC CREATININE: 2.8 MG/DL (ref 0.8–1.3)
POC CREATININE: 3.7 MG/DL (ref 0.8–1.3)
POC CREATININE: 4.3 MG/DL (ref 0.8–1.3)
POC CREATININE: 5.3 MG/DL (ref 0.8–1.3)
POC CREATININE: 6.1 MG/DL (ref 0.8–1.3)
POC CREATININE: 6.1 MG/DL (ref 0.8–1.3)
POC CREATININE: 6.6 MG/DL (ref 0.8–1.3)
POC CREATININE: 6.7 MG/DL (ref 0.8–1.3)
POC CREATININE: 7.1 MG/DL (ref 0.8–1.3)
POC CREATININE: 7.2 MG/DL (ref 0.8–1.3)
POC CREATININE: 8.8 MG/DL (ref 0.8–1.3)
POC CREATININE: 9.5 MG/DL (ref 0.8–1.3)
POC CREATININE: 9.5 MG/DL (ref 0.8–1.3)
POC CREATININE: 9.6 MG/DL (ref 0.8–1.3)
POC CREATININE: 9.9 MG/DL (ref 0.8–1.3)
POC FIO2: 100
POC FIO2: 70
POC FIO2: 80
POC FIO2: 90
POC HEMATOCRIT: 23 % (ref 41–53)
POC HEMATOCRIT: 24 % (ref 41–53)
POC HEMATOCRIT: 26 % (ref 41–53)
POC HEMATOCRIT: 26 % (ref 41–53)
POC HEMATOCRIT: 28 % (ref 41–53)
POC HEMATOCRIT: 29 % (ref 41–53)
POC HEMATOCRIT: 31 % (ref 41–53)
POC HEMATOCRIT: 32 % (ref 41–53)
POC HEMATOCRIT: 33 % (ref 41–53)
POC HEMATOCRIT: 34 % (ref 41–53)
POC HEMATOCRIT: 35 % (ref 41–53)
POC HEMATOCRIT: 37 % (ref 41–53)
POC HEMATOCRIT: 38 % (ref 41–53)
POC HEMATOCRIT: 39 % (ref 41–53)
POC HEMATOCRIT: 39 % (ref 41–53)
POC HEMATOCRIT: 40 % (ref 41–53)
POC HEMATOCRIT: 41 % (ref 41–53)
POC HEMATOCRIT: 42 % (ref 41–53)
POC HEMATOCRIT: 45 % (ref 41–53)
POC POTASSIUM: 3.6 MEQ/L (ref 3.5–5.1)
POC POTASSIUM: 3.7 MEQ/L (ref 3.5–5.1)
POC POTASSIUM: 4 MEQ/L (ref 3.5–5.1)
POC POTASSIUM: 4.1 MEQ/L (ref 3.5–5.1)
POC POTASSIUM: 4.2 MEQ/L (ref 3.5–5.1)
POC POTASSIUM: 4.3 MEQ/L (ref 3.5–5.1)
POC POTASSIUM: 4.4 MEQ/L (ref 3.5–5.1)
POC POTASSIUM: 4.5 MEQ/L (ref 3.5–5.1)
POC POTASSIUM: 4.5 MEQ/L (ref 3.5–5.1)
POC POTASSIUM: 4.6 MEQ/L (ref 3.5–5.1)
POC POTASSIUM: 4.7 MEQ/L (ref 3.5–5.1)
POC POTASSIUM: 4.8 MEQ/L (ref 3.5–5.1)
POC POTASSIUM: 5 MEQ/L (ref 3.5–5.1)
POC POTASSIUM: 5.6 MEQ/L (ref 3.5–5.1)
POC POTASSIUM: 5.7 MEQ/L (ref 3.5–5.1)
POC POTASSIUM: 5.8 MEQ/L (ref 3.5–5.1)
POC POTASSIUM: 5.9 MEQ/L (ref 3.5–5.1)
POC POTASSIUM: 7.3 MEQ/L (ref 3.5–5.1)
POC SAMPLE TYPE: ABNORMAL
POC SODIUM: 129 MEQ/L (ref 136–145)
POC SODIUM: 129 MEQ/L (ref 136–145)
POC SODIUM: 131 MEQ/L (ref 136–145)
POC SODIUM: 132 MEQ/L (ref 136–145)
POC SODIUM: 132 MEQ/L (ref 136–145)
POC SODIUM: 133 MEQ/L (ref 136–145)
POC SODIUM: 133 MEQ/L (ref 136–145)
POC SODIUM: 134 MEQ/L (ref 136–145)
POC SODIUM: 135 MEQ/L (ref 136–145)
POC SODIUM: 136 MEQ/L (ref 136–145)
POC SODIUM: 137 MEQ/L (ref 136–145)
POC SODIUM: 138 MEQ/L (ref 136–145)
POC SODIUM: 139 MEQ/L (ref 136–145)
POC SODIUM: 139 MEQ/L (ref 136–145)
POTASSIUM REFLEX MAGNESIUM: 5.9 MEQ/L (ref 3.4–4.9)
POTASSIUM SERPL-SCNC: 4.7 MEQ/L (ref 3.4–4.9)
POTASSIUM SERPL-SCNC: 5 MEQ/L (ref 3.4–4.9)
POTASSIUM SERPL-SCNC: 5 MEQ/L (ref 3.4–4.9)
POTASSIUM SERPL-SCNC: 5.1 MEQ/L (ref 3.4–4.9)
POTASSIUM SERPL-SCNC: 5.2 MEQ/L (ref 3.4–4.9)
POTASSIUM SERPL-SCNC: 5.3 MEQ/L (ref 3.4–4.9)
POTASSIUM SERPL-SCNC: 5.5 MEQ/L (ref 3.4–4.9)
POTASSIUM SERPL-SCNC: 6.3 MEQ/L (ref 3.4–4.9)
PROCALCITONIN: 0.51 NG/ML (ref 0–0.15)
PROCALCITONIN: 5.41 NG/ML (ref 0–0.15)
PROTHROMBIN TIME: 14.5 SEC (ref 12.3–14.9)
PROTHROMBIN TIME: 14.6 SEC (ref 12.3–14.9)
RBC # BLD: 3.03 M/UL (ref 4.7–6.1)
RBC # BLD: 3.05 M/UL (ref 4.7–6.1)
RBC # BLD: 3.49 M/UL (ref 4.7–6.1)
RBC # BLD: 3.76 M/UL (ref 4.7–6.1)
RBC # BLD: 3.76 M/UL (ref 4.7–6.1)
RBC # BLD: 3.99 M/UL (ref 4.7–6.1)
RBC # BLD: 4.02 M/UL (ref 4.7–6.1)
RBC # BLD: 4.24 M/UL (ref 4.7–6.1)
RBC # BLD: 4.31 M/UL (ref 4.7–6.1)
RBC # BLD: 4.54 M/UL (ref 4.7–6.1)
RBC # BLD: 4.55 M/UL (ref 4.7–6.1)
RBC # BLD: 5.02 M/UL (ref 4.7–6.1)
REASON FOR REJECTION: NORMAL
REJECTED TEST: NORMAL
SARS-COV-2, NAAT: DETECTED
SMUDGE CELLS: 1.8
SODIUM BLD-SCNC: 129 MEQ/L (ref 135–144)
SODIUM BLD-SCNC: 133 MEQ/L (ref 135–144)
SODIUM BLD-SCNC: 133 MEQ/L (ref 135–144)
SODIUM BLD-SCNC: 134 MEQ/L (ref 135–144)
SODIUM BLD-SCNC: 136 MEQ/L (ref 135–144)
SODIUM BLD-SCNC: 137 MEQ/L (ref 135–144)
SODIUM BLD-SCNC: 138 MEQ/L (ref 135–144)
SODIUM BLD-SCNC: 140 MEQ/L (ref 135–144)
TCO2 ARTERIAL: 18 (ref 22–29)
TCO2 ARTERIAL: 19 (ref 22–29)
TCO2 ARTERIAL: 20 (ref 22–29)
TCO2 ARTERIAL: 21 (ref 22–29)
TCO2 ARTERIAL: 22 (ref 22–29)
TCO2 ARTERIAL: 23 (ref 22–29)
TCO2 ARTERIAL: 24 (ref 22–29)
TCO2 ARTERIAL: 25 (ref 22–29)
TCO2 ARTERIAL: 25 (ref 22–29)
TCO2 ARTERIAL: 26 (ref 22–29)
TCO2 ARTERIAL: 27 (ref 22–29)
TCO2 ARTERIAL: 27 (ref 22–29)
TCO2 ARTERIAL: 28 (ref 22–29)
TCO2 ARTERIAL: 29 (ref 22–29)
TOTAL PROTEIN: 5.5 G/DL (ref 6.3–8)
TOTAL PROTEIN: 5.5 G/DL (ref 6.3–8)
TOTAL PROTEIN: 5.6 G/DL (ref 6.3–8)
TOTAL PROTEIN: 5.6 G/DL (ref 6.3–8)
TOTAL PROTEIN: 5.7 G/DL (ref 6.3–8)
TOTAL PROTEIN: 5.9 G/DL (ref 6.3–8)
TOTAL PROTEIN: 6.2 G/DL (ref 6.3–8)
TOTAL PROTEIN: 6.3 G/DL (ref 6.3–8)
TOTAL PROTEIN: 6.4 G/DL (ref 6.3–8)
TOTAL PROTEIN: 7.4 G/DL (ref 6.3–8)
TROPONIN: <0.01 NG/ML (ref 0–0.01)
URINE CULTURE, ROUTINE: NORMAL
VANCOMYCIN RANDOM: 17.7 UG/ML (ref 10–40)
WBC # BLD: 10.5 K/UL (ref 4.8–10.8)
WBC # BLD: 10.7 K/UL (ref 4.8–10.8)
WBC # BLD: 12 K/UL (ref 4.8–10.8)
WBC # BLD: 12.8 K/UL (ref 4.8–10.8)
WBC # BLD: 14.7 K/UL (ref 4.8–10.8)
WBC # BLD: 15.8 K/UL (ref 4.8–10.8)
WBC # BLD: 16.8 K/UL (ref 4.8–10.8)
WBC # BLD: 17.9 K/UL (ref 4.8–10.8)
WBC # BLD: 18.2 K/UL (ref 4.8–10.8)
WBC # BLD: 19.2 K/UL (ref 4.8–10.8)
WBC # BLD: 20.4 K/UL (ref 4.8–10.8)
WBC # BLD: 7.3 K/UL (ref 4.8–10.8)

## 2021-01-01 PROCEDURE — 84145 PROCALCITONIN (PCT): CPT

## 2021-01-01 PROCEDURE — 85014 HEMATOCRIT: CPT

## 2021-01-01 PROCEDURE — 37799 UNLISTED PX VASCULAR SURGERY: CPT

## 2021-01-01 PROCEDURE — 2500000003 HC RX 250 WO HCPCS: Performed by: NURSE PRACTITIONER

## 2021-01-01 PROCEDURE — 82948 REAGENT STRIP/BLOOD GLUCOSE: CPT

## 2021-01-01 PROCEDURE — 90935 HEMODIALYSIS ONE EVALUATION: CPT

## 2021-01-01 PROCEDURE — 6360000002 HC RX W HCPCS: Performed by: INTERNAL MEDICINE

## 2021-01-01 PROCEDURE — 82565 ASSAY OF CREATININE: CPT

## 2021-01-01 PROCEDURE — 6360000002 HC RX W HCPCS: Performed by: NURSE PRACTITIONER

## 2021-01-01 PROCEDURE — 82728 ASSAY OF FERRITIN: CPT

## 2021-01-01 PROCEDURE — 2580000003 HC RX 258: Performed by: INTERNAL MEDICINE

## 2021-01-01 PROCEDURE — 80053 COMPREHEN METABOLIC PANEL: CPT

## 2021-01-01 PROCEDURE — 83605 ASSAY OF LACTIC ACID: CPT

## 2021-01-01 PROCEDURE — 93005 ELECTROCARDIOGRAM TRACING: CPT | Performed by: INTERNAL MEDICINE

## 2021-01-01 PROCEDURE — 84295 ASSAY OF SERUM SODIUM: CPT

## 2021-01-01 PROCEDURE — 6370000000 HC RX 637 (ALT 250 FOR IP): Performed by: NURSE PRACTITIONER

## 2021-01-01 PROCEDURE — 85520 HEPARIN ASSAY: CPT

## 2021-01-01 PROCEDURE — 31500 INSERT EMERGENCY AIRWAY: CPT

## 2021-01-01 PROCEDURE — 2580000003 HC RX 258: Performed by: NURSE PRACTITIONER

## 2021-01-01 PROCEDURE — 6370000000 HC RX 637 (ALT 250 FOR IP): Performed by: INTERNAL MEDICINE

## 2021-01-01 PROCEDURE — 02HV33Z INSERTION OF INFUSION DEVICE INTO SUPERIOR VENA CAVA, PERCUTANEOUS APPROACH: ICD-10-PCS | Performed by: INTERNAL MEDICINE

## 2021-01-01 PROCEDURE — 85025 COMPLETE CBC W/AUTO DIFF WBC: CPT

## 2021-01-01 PROCEDURE — 82435 ASSAY OF BLOOD CHLORIDE: CPT

## 2021-01-01 PROCEDURE — 85379 FIBRIN DEGRADATION QUANT: CPT

## 2021-01-01 PROCEDURE — 94003 VENT MGMT INPAT SUBQ DAY: CPT

## 2021-01-01 PROCEDURE — 86140 C-REACTIVE PROTEIN: CPT

## 2021-01-01 PROCEDURE — 85730 THROMBOPLASTIN TIME PARTIAL: CPT

## 2021-01-01 PROCEDURE — 99291 CRITICAL CARE FIRST HOUR: CPT | Performed by: INTERNAL MEDICINE

## 2021-01-01 PROCEDURE — 76937 US GUIDE VASCULAR ACCESS: CPT | Performed by: INTERNAL MEDICINE

## 2021-01-01 PROCEDURE — 85610 PROTHROMBIN TIME: CPT

## 2021-01-01 PROCEDURE — 82803 BLOOD GASES ANY COMBINATION: CPT

## 2021-01-01 PROCEDURE — 2700000000 HC OXYGEN THERAPY PER DAY

## 2021-01-01 PROCEDURE — 82330 ASSAY OF CALCIUM: CPT

## 2021-01-01 PROCEDURE — 85027 COMPLETE CBC AUTOMATED: CPT

## 2021-01-01 PROCEDURE — 93970 EXTREMITY STUDY: CPT

## 2021-01-01 PROCEDURE — 2500000003 HC RX 250 WO HCPCS: Performed by: INTERNAL MEDICINE

## 2021-01-01 PROCEDURE — 99254 IP/OBS CNSLTJ NEW/EST MOD 60: CPT | Performed by: INTERNAL MEDICINE

## 2021-01-01 PROCEDURE — 76775 US EXAM ABDO BACK WALL LIM: CPT

## 2021-01-01 PROCEDURE — 87086 URINE CULTURE/COLONY COUNT: CPT

## 2021-01-01 PROCEDURE — 94761 N-INVAS EAR/PLS OXIMETRY MLT: CPT

## 2021-01-01 PROCEDURE — 84132 ASSAY OF SERUM POTASSIUM: CPT

## 2021-01-01 PROCEDURE — C9113 INJ PANTOPRAZOLE SODIUM, VIA: HCPCS | Performed by: INTERNAL MEDICINE

## 2021-01-01 PROCEDURE — 2000000000 HC ICU R&B

## 2021-01-01 PROCEDURE — 93010 ELECTROCARDIOGRAM REPORT: CPT | Performed by: INTERNAL MEDICINE

## 2021-01-01 PROCEDURE — 71045 X-RAY EXAM CHEST 1 VIEW: CPT

## 2021-01-01 PROCEDURE — 36556 INSERT NON-TUNNEL CV CATH: CPT

## 2021-01-01 PROCEDURE — 36600 WITHDRAWAL OF ARTERIAL BLOOD: CPT

## 2021-01-01 PROCEDURE — 99233 SBSQ HOSP IP/OBS HIGH 50: CPT | Performed by: INTERNAL MEDICINE

## 2021-01-01 PROCEDURE — 99285 EMERGENCY DEPT VISIT HI MDM: CPT

## 2021-01-01 PROCEDURE — 80202 ASSAY OF VANCOMYCIN: CPT

## 2021-01-01 PROCEDURE — 93005 ELECTROCARDIOGRAM TRACING: CPT | Performed by: PHYSICIAN ASSISTANT

## 2021-01-01 PROCEDURE — 96375 TX/PRO/DX INJ NEW DRUG ADDON: CPT

## 2021-01-01 PROCEDURE — 89220 SPUTUM SPECIMEN COLLECTION: CPT

## 2021-01-01 PROCEDURE — 36620 INSERTION CATHETER ARTERY: CPT

## 2021-01-01 PROCEDURE — 87635 SARS-COV-2 COVID-19 AMP PRB: CPT

## 2021-01-01 PROCEDURE — 36556 INSERT NON-TUNNEL CV CATH: CPT | Performed by: INTERNAL MEDICINE

## 2021-01-01 PROCEDURE — 36415 COLL VENOUS BLD VENIPUNCTURE: CPT

## 2021-01-01 PROCEDURE — 2500000003 HC RX 250 WO HCPCS

## 2021-01-01 PROCEDURE — 36573 INSJ PICC RS&I 5 YR+: CPT

## 2021-01-01 PROCEDURE — 5A1955Z RESPIRATORY VENTILATION, GREATER THAN 96 CONSECUTIVE HOURS: ICD-10-PCS | Performed by: INTERNAL MEDICINE

## 2021-01-01 PROCEDURE — 99232 SBSQ HOSP IP/OBS MODERATE 35: CPT | Performed by: NURSE PRACTITIONER

## 2021-01-01 PROCEDURE — 5A1D70Z PERFORMANCE OF URINARY FILTRATION, INTERMITTENT, LESS THAN 6 HOURS PER DAY: ICD-10-PCS | Performed by: INTERNAL MEDICINE

## 2021-01-01 PROCEDURE — 0BH17EZ INSERTION OF ENDOTRACHEAL AIRWAY INTO TRACHEA, VIA NATURAL OR ARTIFICIAL OPENING: ICD-10-PCS | Performed by: INTERNAL MEDICINE

## 2021-01-01 PROCEDURE — 31500 INSERT EMERGENCY AIRWAY: CPT | Performed by: INTERNAL MEDICINE

## 2021-01-01 PROCEDURE — 8010000000 HC HEMODIALYSIS ACUTE INPT

## 2021-01-01 PROCEDURE — 6360000002 HC RX W HCPCS: Performed by: STUDENT IN AN ORGANIZED HEALTH CARE EDUCATION/TRAINING PROGRAM

## 2021-01-01 PROCEDURE — 80074 ACUTE HEPATITIS PANEL: CPT

## 2021-01-01 PROCEDURE — 84484 ASSAY OF TROPONIN QUANT: CPT

## 2021-01-01 PROCEDURE — 99232 SBSQ HOSP IP/OBS MODERATE 35: CPT | Performed by: INTERNAL MEDICINE

## 2021-01-01 PROCEDURE — 70450 CT HEAD/BRAIN W/O DYE: CPT

## 2021-01-01 PROCEDURE — 83036 HEMOGLOBIN GLYCOSYLATED A1C: CPT

## 2021-01-01 PROCEDURE — 96374 THER/PROPH/DIAG INJ IV PUSH: CPT

## 2021-01-01 PROCEDURE — 2500000003 HC RX 250 WO HCPCS: Performed by: PHYSICIAN ASSISTANT

## 2021-01-01 PROCEDURE — 87040 BLOOD CULTURE FOR BACTERIA: CPT

## 2021-01-01 PROCEDURE — 99255 IP/OBS CONSLTJ NEW/EST HI 80: CPT | Performed by: NURSE PRACTITIONER

## 2021-01-01 PROCEDURE — 2709999900 IR PICC WO SQ PORT/PUMP > 5 YEARS

## 2021-01-01 PROCEDURE — 6360000002 HC RX W HCPCS: Performed by: PHYSICIAN ASSISTANT

## 2021-01-01 PROCEDURE — 2580000003 HC RX 258

## 2021-01-01 PROCEDURE — 2580000003 HC RX 258: Performed by: PHYSICIAN ASSISTANT

## 2021-01-01 PROCEDURE — 94002 VENT MGMT INPAT INIT DAY: CPT

## 2021-01-01 RX ORDER — HYDRALAZINE HYDROCHLORIDE 20 MG/ML
10 INJECTION INTRAMUSCULAR; INTRAVENOUS EVERY 6 HOURS PRN
Status: DISCONTINUED | OUTPATIENT
Start: 2021-01-01 | End: 2022-01-01 | Stop reason: HOSPADM

## 2021-01-01 RX ORDER — CISATRACURIUM BESYLATE 2 MG/ML
10 INJECTION, SOLUTION INTRAVENOUS ONCE
Status: COMPLETED | OUTPATIENT
Start: 2021-01-01 | End: 2021-01-01

## 2021-01-01 RX ORDER — HEPARIN SODIUM 1000 [USP'U]/ML
4000 INJECTION, SOLUTION INTRAVENOUS; SUBCUTANEOUS PRN
Status: DISCONTINUED | OUTPATIENT
Start: 2021-01-01 | End: 2021-01-01 | Stop reason: SDUPTHER

## 2021-01-01 RX ORDER — HEPARIN SODIUM 10000 [USP'U]/100ML
5-30 INJECTION, SOLUTION INTRAVENOUS CONTINUOUS
Status: DISCONTINUED | OUTPATIENT
Start: 2021-01-01 | End: 2021-01-01 | Stop reason: RX

## 2021-01-01 RX ORDER — 0.9 % SODIUM CHLORIDE 0.9 %
30 INTRAVENOUS SOLUTION INTRAVENOUS PRN
Status: DISCONTINUED | OUTPATIENT
Start: 2021-01-01 | End: 2022-01-01 | Stop reason: HOSPADM

## 2021-01-01 RX ORDER — PROPOFOL 10 MG/ML
INJECTION, EMULSION INTRAVENOUS
Status: DISPENSED
Start: 2021-01-01 | End: 2021-01-01

## 2021-01-01 RX ORDER — CHLORHEXIDINE GLUCONATE 0.12 MG/ML
15 RINSE ORAL 2 TIMES DAILY
Status: DISCONTINUED | OUTPATIENT
Start: 2021-01-01 | End: 2022-01-01 | Stop reason: HOSPADM

## 2021-01-01 RX ORDER — NICOTINE POLACRILEX 4 MG
15 LOZENGE BUCCAL PRN
Status: DISCONTINUED | OUTPATIENT
Start: 2021-01-01 | End: 2022-01-01 | Stop reason: HOSPADM

## 2021-01-01 RX ORDER — SODIUM CHLORIDE 0.9 % (FLUSH) 0.9 %
5-40 SYRINGE (ML) INJECTION EVERY 12 HOURS SCHEDULED
Status: DISCONTINUED | OUTPATIENT
Start: 2021-01-01 | End: 2022-01-01 | Stop reason: HOSPADM

## 2021-01-01 RX ORDER — LABETALOL HYDROCHLORIDE 5 MG/ML
10 INJECTION, SOLUTION INTRAVENOUS
Status: DISCONTINUED | OUTPATIENT
Start: 2021-01-01 | End: 2022-01-01 | Stop reason: HOSPADM

## 2021-01-01 RX ORDER — SODIUM CHLORIDE 0.9 % (FLUSH) 0.9 %
5-40 SYRINGE (ML) INJECTION PRN
Status: DISCONTINUED | OUTPATIENT
Start: 2021-01-01 | End: 2022-01-01 | Stop reason: HOSPADM

## 2021-01-01 RX ORDER — 0.9 % SODIUM CHLORIDE 0.9 %
1000 INTRAVENOUS SOLUTION INTRAVENOUS ONCE
Status: COMPLETED | OUTPATIENT
Start: 2021-01-01 | End: 2021-01-01

## 2021-01-01 RX ORDER — HEPARIN SODIUM 5000 [USP'U]/ML
7500 INJECTION, SOLUTION INTRAVENOUS; SUBCUTANEOUS EVERY 8 HOURS SCHEDULED
Status: DISCONTINUED | OUTPATIENT
Start: 2021-01-01 | End: 2021-01-01

## 2021-01-01 RX ORDER — ALBUMIN (HUMAN) 12.5 G/50ML
25 SOLUTION INTRAVENOUS DAILY PRN
Status: DISCONTINUED | OUTPATIENT
Start: 2021-01-01 | End: 2022-01-01 | Stop reason: HOSPADM

## 2021-01-01 RX ORDER — LABETALOL HYDROCHLORIDE 5 MG/ML
40 INJECTION, SOLUTION INTRAVENOUS ONCE
Status: COMPLETED | OUTPATIENT
Start: 2021-01-01 | End: 2021-01-01

## 2021-01-01 RX ORDER — ROCURONIUM BROMIDE 10 MG/ML
100 INJECTION, SOLUTION INTRAVENOUS ONCE
Status: COMPLETED | OUTPATIENT
Start: 2021-01-01 | End: 2021-01-01

## 2021-01-01 RX ORDER — SENNA AND DOCUSATE SODIUM 50; 8.6 MG/1; MG/1
2 TABLET, FILM COATED ORAL 2 TIMES DAILY
Status: DISCONTINUED | OUTPATIENT
Start: 2021-01-01 | End: 2022-01-01 | Stop reason: HOSPADM

## 2021-01-01 RX ORDER — LACTULOSE 10 G/15ML
20 SOLUTION ORAL 3 TIMES DAILY
Status: DISCONTINUED | OUTPATIENT
Start: 2021-01-01 | End: 2021-01-01

## 2021-01-01 RX ORDER — SODIUM CHLORIDE 9 MG/ML
INJECTION, SOLUTION INTRAVENOUS
Status: DISPENSED
Start: 2021-01-01 | End: 2022-01-01

## 2021-01-01 RX ORDER — 0.9 % SODIUM CHLORIDE 0.9 %
600 INTRAVENOUS SOLUTION INTRAVENOUS ONCE
Status: COMPLETED | OUTPATIENT
Start: 2021-01-01 | End: 2021-01-01

## 2021-01-01 RX ORDER — HEPARIN SODIUM 10000 [USP'U]/100ML
5-30 INJECTION, SOLUTION INTRAVENOUS CONTINUOUS
Status: DISCONTINUED | OUTPATIENT
Start: 2021-01-01 | End: 2021-01-01

## 2021-01-01 RX ORDER — HEPARIN SODIUM 1000 [USP'U]/ML
2000 INJECTION, SOLUTION INTRAVENOUS; SUBCUTANEOUS PRN
Status: DISCONTINUED | OUTPATIENT
Start: 2021-01-01 | End: 2021-01-01 | Stop reason: SDUPTHER

## 2021-01-01 RX ORDER — DEXAMETHASONE SODIUM PHOSPHATE 10 MG/ML
6 INJECTION INTRAMUSCULAR; INTRAVENOUS EVERY 24 HOURS
Status: DISCONTINUED | OUTPATIENT
Start: 2021-01-01 | End: 2021-01-01

## 2021-01-01 RX ORDER — DEXTROSE MONOHYDRATE 25 G/50ML
12.5 INJECTION, SOLUTION INTRAVENOUS PRN
Status: DISCONTINUED | OUTPATIENT
Start: 2021-01-01 | End: 2022-01-01 | Stop reason: HOSPADM

## 2021-01-01 RX ORDER — MIDAZOLAM HYDROCHLORIDE 2 MG/2ML
2 INJECTION, SOLUTION INTRAMUSCULAR; INTRAVENOUS
Status: DISCONTINUED | OUTPATIENT
Start: 2021-01-01 | End: 2022-01-01 | Stop reason: HOSPADM

## 2021-01-01 RX ORDER — PANTOPRAZOLE SODIUM 40 MG/10ML
40 INJECTION, POWDER, LYOPHILIZED, FOR SOLUTION INTRAVENOUS DAILY
Status: DISCONTINUED | OUTPATIENT
Start: 2021-01-01 | End: 2021-01-01

## 2021-01-01 RX ORDER — DEXAMETHASONE SODIUM PHOSPHATE 10 MG/ML
6 INJECTION INTRAMUSCULAR; INTRAVENOUS ONCE
Status: COMPLETED | OUTPATIENT
Start: 2021-01-01 | End: 2021-01-01

## 2021-01-01 RX ORDER — SODIUM CHLORIDE, SODIUM LACTATE, POTASSIUM CHLORIDE, CALCIUM CHLORIDE 600; 310; 30; 20 MG/100ML; MG/100ML; MG/100ML; MG/100ML
INJECTION, SOLUTION INTRAVENOUS
Status: COMPLETED
Start: 2021-01-01 | End: 2021-01-01

## 2021-01-01 RX ORDER — HEPARIN SODIUM 1000 [USP'U]/ML
4000 INJECTION, SOLUTION INTRAVENOUS; SUBCUTANEOUS ONCE
Status: COMPLETED | OUTPATIENT
Start: 2021-01-01 | End: 2021-01-01

## 2021-01-01 RX ORDER — SODIUM CHLORIDE 9 MG/ML
INJECTION, SOLUTION INTRAVENOUS
Status: DISPENSED
Start: 2021-01-01 | End: 2021-01-01

## 2021-01-01 RX ORDER — SODIUM CHLORIDE 9 MG/ML
INJECTION, SOLUTION INTRAVENOUS CONTINUOUS
Status: DISCONTINUED | OUTPATIENT
Start: 2021-01-01 | End: 2021-01-01

## 2021-01-01 RX ORDER — DEXTROSE MONOHYDRATE 25 G/50ML
25 INJECTION, SOLUTION INTRAVENOUS ONCE
Status: COMPLETED | OUTPATIENT
Start: 2021-01-01 | End: 2021-01-01

## 2021-01-01 RX ORDER — PROPOFOL 10 MG/ML
INJECTION, EMULSION INTRAVENOUS CONTINUOUS PRN
Status: COMPLETED | OUTPATIENT
Start: 2021-01-01 | End: 2021-01-01

## 2021-01-01 RX ORDER — SODIUM CHLORIDE 9 MG/ML
250 INJECTION, SOLUTION INTRAVENOUS ONCE
Status: COMPLETED | OUTPATIENT
Start: 2021-01-01 | End: 2021-01-01

## 2021-01-01 RX ORDER — LIDOCAINE HYDROCHLORIDE 20 MG/ML
5 INJECTION, SOLUTION INFILTRATION; PERINEURAL ONCE
Status: COMPLETED | OUTPATIENT
Start: 2021-01-01 | End: 2021-01-01

## 2021-01-01 RX ORDER — ETOMIDATE 2 MG/ML
20 INJECTION INTRAVENOUS ONCE
Status: COMPLETED | OUTPATIENT
Start: 2021-01-01 | End: 2021-01-01

## 2021-01-01 RX ORDER — PANTOPRAZOLE SODIUM 40 MG/10ML
40 INJECTION, POWDER, LYOPHILIZED, FOR SOLUTION INTRAVENOUS 2 TIMES DAILY
Status: DISCONTINUED | OUTPATIENT
Start: 2021-01-01 | End: 2022-01-01 | Stop reason: HOSPADM

## 2021-01-01 RX ORDER — CARBOXYMETHYLCELLULOSE SODIUM 5 MG/ML
2 SOLUTION/ DROPS OPHTHALMIC 3 TIMES DAILY
Status: DISCONTINUED | OUTPATIENT
Start: 2021-01-01 | End: 2022-01-01 | Stop reason: HOSPADM

## 2021-01-01 RX ORDER — HEPARIN SODIUM 1000 [USP'U]/ML
1000 INJECTION, SOLUTION INTRAVENOUS; SUBCUTANEOUS PRN
Status: DISCONTINUED | OUTPATIENT
Start: 2021-01-01 | End: 2022-01-01 | Stop reason: HOSPADM

## 2021-01-01 RX ORDER — POLYETHYLENE GLYCOL 3350 17 G/17G
17 POWDER, FOR SOLUTION ORAL DAILY
Status: DISCONTINUED | OUTPATIENT
Start: 2021-01-01 | End: 2022-01-01 | Stop reason: HOSPADM

## 2021-01-01 RX ORDER — SODIUM CHLORIDE 9 MG/ML
25 INJECTION, SOLUTION INTRAVENOUS PRN
Status: DISCONTINUED | OUTPATIENT
Start: 2021-01-01 | End: 2022-01-01 | Stop reason: HOSPADM

## 2021-01-01 RX ORDER — DEXAMETHASONE SODIUM PHOSPHATE 10 MG/ML
6 INJECTION INTRAMUSCULAR; INTRAVENOUS EVERY 24 HOURS
Status: COMPLETED | OUTPATIENT
Start: 2021-01-01 | End: 2021-01-01

## 2021-01-01 RX ORDER — DEXTROSE MONOHYDRATE 50 MG/ML
100 INJECTION, SOLUTION INTRAVENOUS PRN
Status: DISCONTINUED | OUTPATIENT
Start: 2021-01-01 | End: 2022-01-01 | Stop reason: HOSPADM

## 2021-01-01 RX ORDER — HEPARIN SODIUM 1000 [USP'U]/ML
4000 INJECTION, SOLUTION INTRAVENOUS; SUBCUTANEOUS PRN
Status: DISCONTINUED | OUTPATIENT
Start: 2021-01-01 | End: 2022-01-01 | Stop reason: HOSPADM

## 2021-01-01 RX ORDER — HEPARIN SODIUM 1000 [USP'U]/ML
2000 INJECTION, SOLUTION INTRAVENOUS; SUBCUTANEOUS PRN
Status: DISCONTINUED | OUTPATIENT
Start: 2021-01-01 | End: 2022-01-01 | Stop reason: HOSPADM

## 2021-01-01 RX ORDER — METOCLOPRAMIDE HYDROCHLORIDE 5 MG/ML
10 INJECTION INTRAMUSCULAR; INTRAVENOUS EVERY 8 HOURS
Status: DISCONTINUED | OUTPATIENT
Start: 2021-01-01 | End: 2022-01-01 | Stop reason: HOSPADM

## 2021-01-01 RX ORDER — PROPOFOL 10 MG/ML
5-50 INJECTION, EMULSION INTRAVENOUS
Status: DISCONTINUED | OUTPATIENT
Start: 2021-01-01 | End: 2022-01-01 | Stop reason: HOSPADM

## 2021-01-01 RX ORDER — FUROSEMIDE 10 MG/ML
100 INJECTION INTRAMUSCULAR; INTRAVENOUS ONCE
Status: COMPLETED | OUTPATIENT
Start: 2021-01-01 | End: 2021-01-01

## 2021-01-01 RX ORDER — MIDAZOLAM HYDROCHLORIDE 2 MG/2ML
2 INJECTION, SOLUTION INTRAMUSCULAR; INTRAVENOUS ONCE
Status: COMPLETED | OUTPATIENT
Start: 2021-01-01 | End: 2021-01-01

## 2021-01-01 RX ADMIN — DEXAMETHASONE SODIUM PHOSPHATE 6 MG: 10 INJECTION INTRAMUSCULAR; INTRAVENOUS at 17:46

## 2021-01-01 RX ADMIN — PROPOFOL 20 MCG/KG/MIN: 10 INJECTION, EMULSION INTRAVENOUS at 01:30

## 2021-01-01 RX ADMIN — LACTULOSE 20 G: 20 SOLUTION ORAL at 00:31

## 2021-01-01 RX ADMIN — FENTANYL CITRATE 200 MCG/HR: 50 INJECTION INTRAVENOUS at 08:08

## 2021-01-01 RX ADMIN — FENTANYL CITRATE 125 MCG/HR: 50 INJECTION INTRAVENOUS at 01:39

## 2021-01-01 RX ADMIN — CISATRACURIUM BESYLATE 0.5 MCG/KG/MIN: 10 INJECTION, SOLUTION INTRAVENOUS at 10:03

## 2021-01-01 RX ADMIN — PROPOFOL 50 MCG/KG/MIN: 10 INJECTION, EMULSION INTRAVENOUS at 09:06

## 2021-01-01 RX ADMIN — PROPOFOL 50 MCG/KG/MIN: 10 INJECTION, EMULSION INTRAVENOUS at 12:01

## 2021-01-01 RX ADMIN — SODIUM CHLORIDE 600 ML: 9 INJECTION, SOLUTION INTRAVENOUS at 23:17

## 2021-01-01 RX ADMIN — FENTANYL CITRATE 150 MCG/HR: 50 INJECTION INTRAVENOUS at 08:56

## 2021-01-01 RX ADMIN — PROPOFOL 50 MCG/KG/MIN: 10 INJECTION, EMULSION INTRAVENOUS at 06:16

## 2021-01-01 RX ADMIN — CHLORHEXIDINE GLUCONATE 15 ML: 1.2 RINSE ORAL at 20:27

## 2021-01-01 RX ADMIN — SENNOSIDES, DOCUSATE SODIUM 2 TABLET: 8.6; 5 TABLET ORAL at 20:47

## 2021-01-01 RX ADMIN — INSULIN LISPRO 1 UNITS: 100 INJECTION, SOLUTION INTRAVENOUS; SUBCUTANEOUS at 06:12

## 2021-01-01 RX ADMIN — CHLORHEXIDINE GLUCONATE 15 ML: 1.2 RINSE ORAL at 21:22

## 2021-01-01 RX ADMIN — Medication 10 ML: at 21:00

## 2021-01-01 RX ADMIN — HEPARIN SODIUM 7500 UNITS: 5000 INJECTION INTRAVENOUS; SUBCUTANEOUS at 18:21

## 2021-01-01 RX ADMIN — DEXAMETHASONE SODIUM PHOSPHATE 6 MG: 10 INJECTION INTRAMUSCULAR; INTRAVENOUS at 17:05

## 2021-01-01 RX ADMIN — MIDAZOLAM 2 MG: 1 INJECTION INTRAMUSCULAR; INTRAVENOUS at 01:47

## 2021-01-01 RX ADMIN — FENTANYL CITRATE 75 MCG/HR: 50 INJECTION INTRAVENOUS at 12:43

## 2021-01-01 RX ADMIN — PROPOFOL 38 MCG/KG/MIN: 10 INJECTION, EMULSION INTRAVENOUS at 02:10

## 2021-01-01 RX ADMIN — Medication 10 ML: at 21:24

## 2021-01-01 RX ADMIN — INSULIN LISPRO 2 UNITS: 100 INJECTION, SOLUTION INTRAVENOUS; SUBCUTANEOUS at 05:31

## 2021-01-01 RX ADMIN — FENTANYL CITRATE 175 MCG/HR: 50 INJECTION INTRAVENOUS at 15:00

## 2021-01-01 RX ADMIN — METOCLOPRAMIDE HYDROCHLORIDE 10 MG: 5 INJECTION INTRAMUSCULAR; INTRAVENOUS at 17:04

## 2021-01-01 RX ADMIN — PROPOFOL 40 MCG/KG/MIN: 10 INJECTION, EMULSION INTRAVENOUS at 20:10

## 2021-01-01 RX ADMIN — PROPOFOL 40 MCG/KG/MIN: 10 INJECTION, EMULSION INTRAVENOUS at 03:58

## 2021-01-01 RX ADMIN — HEPARIN SODIUM 2000 UNITS: 1000 INJECTION, SOLUTION INTRAVENOUS; SUBCUTANEOUS at 23:09

## 2021-01-01 RX ADMIN — PANTOPRAZOLE SODIUM 40 MG: 40 INJECTION, POWDER, FOR SOLUTION INTRAVENOUS at 21:59

## 2021-01-01 RX ADMIN — SODIUM CHLORIDE 1.2 MCG/KG/HR: 9 INJECTION, SOLUTION INTRAVENOUS at 13:05

## 2021-01-01 RX ADMIN — SODIUM ZIRCONIUM CYCLOSILICATE 10 G: 10 POWDER, FOR SUSPENSION ORAL at 08:45

## 2021-01-01 RX ADMIN — Medication 10 ML: at 22:30

## 2021-01-01 RX ADMIN — LACTULOSE 20 G: 20 SOLUTION ORAL at 10:52

## 2021-01-01 RX ADMIN — CALCIUM GLUCONATE 1000 MG: 98 INJECTION, SOLUTION INTRAVENOUS at 06:52

## 2021-01-01 RX ADMIN — METOCLOPRAMIDE HYDROCHLORIDE 10 MG: 5 INJECTION INTRAMUSCULAR; INTRAVENOUS at 00:17

## 2021-01-01 RX ADMIN — PROPOFOL 7.23 MCG/KG/MIN: 10 INJECTION, EMULSION INTRAVENOUS at 18:10

## 2021-01-01 RX ADMIN — PROPOFOL 40 MCG/KG/MIN: 10 INJECTION, EMULSION INTRAVENOUS at 09:48

## 2021-01-01 RX ADMIN — PROPOFOL 40 MCG/KG/MIN: 10 INJECTION, EMULSION INTRAVENOUS at 00:23

## 2021-01-01 RX ADMIN — PROPOFOL 40 MCG/KG/MIN: 10 INJECTION, EMULSION INTRAVENOUS at 00:24

## 2021-01-01 RX ADMIN — Medication 10 ML: at 10:18

## 2021-01-01 RX ADMIN — PROPOFOL 40 MCG/KG/MIN: 10 INJECTION, EMULSION INTRAVENOUS at 07:49

## 2021-01-01 RX ADMIN — PROPOFOL 40 MCG/KG/MIN: 10 INJECTION, EMULSION INTRAVENOUS at 20:52

## 2021-01-01 RX ADMIN — FENTANYL CITRATE 175 MCG/HR: 50 INJECTION INTRAVENOUS at 23:41

## 2021-01-01 RX ADMIN — SODIUM CHLORIDE 25 ML: 900 INJECTION INTRAVENOUS at 11:44

## 2021-01-01 RX ADMIN — DEXAMETHASONE SODIUM PHOSPHATE 6 MG: 10 INJECTION INTRAMUSCULAR; INTRAVENOUS at 18:57

## 2021-01-01 RX ADMIN — HEPARIN SODIUM 2000 UNITS: 1000 INJECTION, SOLUTION INTRAVENOUS; SUBCUTANEOUS at 15:12

## 2021-01-01 RX ADMIN — FENTANYL CITRATE 175 MCG/HR: 50 INJECTION INTRAVENOUS at 14:01

## 2021-01-01 RX ADMIN — SENNOSIDES, DOCUSATE SODIUM 2 TABLET: 8.6; 5 TABLET ORAL at 07:58

## 2021-01-01 RX ADMIN — Medication 2.03 MCG/MIN: at 03:19

## 2021-01-01 RX ADMIN — METOCLOPRAMIDE HYDROCHLORIDE 10 MG: 5 INJECTION INTRAMUSCULAR; INTRAVENOUS at 20:22

## 2021-01-01 RX ADMIN — INSULIN LISPRO 1 UNITS: 100 INJECTION, SOLUTION INTRAVENOUS; SUBCUTANEOUS at 18:30

## 2021-01-01 RX ADMIN — POLYETHYLENE GLYCOL 3350 17 G: 17 POWDER, FOR SOLUTION ORAL at 07:49

## 2021-01-01 RX ADMIN — LACTULOSE 20 G: 20 SOLUTION ORAL at 21:59

## 2021-01-01 RX ADMIN — PANTOPRAZOLE SODIUM 40 MG: 40 INJECTION, POWDER, FOR SOLUTION INTRAVENOUS at 08:01

## 2021-01-01 RX ADMIN — HEPARIN SODIUM AND DEXTROSE 8.38 UNITS/KG/HR: 10000; 5 INJECTION INTRAVENOUS at 15:20

## 2021-01-01 RX ADMIN — FENTANYL CITRATE 200 MCG/HR: 50 INJECTION INTRAVENOUS at 19:48

## 2021-01-01 RX ADMIN — HEPARIN SODIUM 7500 UNITS: 5000 INJECTION INTRAVENOUS; SUBCUTANEOUS at 13:27

## 2021-01-01 RX ADMIN — CISATRACURIUM BESYLATE 0.5 MCG/KG/MIN: 10 INJECTION, SOLUTION INTRAVENOUS at 15:00

## 2021-01-01 RX ADMIN — HEPARIN SODIUM 2000 UNITS: 1000 INJECTION, SOLUTION INTRAVENOUS; SUBCUTANEOUS at 22:50

## 2021-01-01 RX ADMIN — METOCLOPRAMIDE HYDROCHLORIDE 10 MG: 5 INJECTION INTRAMUSCULAR; INTRAVENOUS at 16:13

## 2021-01-01 RX ADMIN — PANTOPRAZOLE SODIUM 40 MG: 40 INJECTION, POWDER, FOR SOLUTION INTRAVENOUS at 21:22

## 2021-01-01 RX ADMIN — CHLORHEXIDINE GLUCONATE 15 ML: 1.2 RINSE ORAL at 07:59

## 2021-01-01 RX ADMIN — INSULIN LISPRO 1 UNITS: 100 INJECTION, SOLUTION INTRAVENOUS; SUBCUTANEOUS at 17:19

## 2021-01-01 RX ADMIN — PROPOFOL 40 MCG/KG/MIN: 10 INJECTION, EMULSION INTRAVENOUS at 18:02

## 2021-01-01 RX ADMIN — POLYETHYLENE GLYCOL 3350 17 G: 17 POWDER, FOR SOLUTION ORAL at 09:09

## 2021-01-01 RX ADMIN — PROPOFOL 50 MCG/KG/MIN: 10 INJECTION, EMULSION INTRAVENOUS at 00:03

## 2021-01-01 RX ADMIN — PROPOFOL 20 MCG/KG/MIN: 10 INJECTION, EMULSION INTRAVENOUS at 05:33

## 2021-01-01 RX ADMIN — SODIUM CHLORIDE 1.2 MCG/KG/HR: 9 INJECTION, SOLUTION INTRAVENOUS at 18:57

## 2021-01-01 RX ADMIN — LIDOCAINE HYDROCHLORIDE 5 ML: 20 INJECTION, SOLUTION INFILTRATION; PERINEURAL at 11:03

## 2021-01-01 RX ADMIN — Medication 10 ML: at 20:40

## 2021-01-01 RX ADMIN — PROPOFOL 50 MCG/KG/MIN: 10 INJECTION, EMULSION INTRAVENOUS at 06:07

## 2021-01-01 RX ADMIN — PROPOFOL 40 MCG/KG/MIN: 10 INJECTION, EMULSION INTRAVENOUS at 15:52

## 2021-01-01 RX ADMIN — FENTANYL CITRATE 200 MCG/HR: 50 INJECTION INTRAVENOUS at 14:25

## 2021-01-01 RX ADMIN — FENTANYL CITRATE 175 MCG/HR: 50 INJECTION INTRAVENOUS at 02:19

## 2021-01-01 RX ADMIN — MEROPENEM 500 MG: 500 INJECTION, POWDER, FOR SOLUTION INTRAVENOUS at 11:43

## 2021-01-01 RX ADMIN — SODIUM CHLORIDE 0.2 MCG/KG/HR: 9 INJECTION, SOLUTION INTRAVENOUS at 21:46

## 2021-01-01 RX ADMIN — Medication 10 ML: at 07:59

## 2021-01-01 RX ADMIN — INSULIN LISPRO 2 UNITS: 100 INJECTION, SOLUTION INTRAVENOUS; SUBCUTANEOUS at 00:32

## 2021-01-01 RX ADMIN — FENTANYL CITRATE 175 MCG/HR: 50 INJECTION INTRAVENOUS at 13:05

## 2021-01-01 RX ADMIN — HEPARIN SODIUM 7500 UNITS: 5000 INJECTION INTRAVENOUS; SUBCUTANEOUS at 21:22

## 2021-01-01 RX ADMIN — SENNOSIDES, DOCUSATE SODIUM 2 TABLET: 8.6; 5 TABLET ORAL at 08:45

## 2021-01-01 RX ADMIN — PROPOFOL 50 MCG/KG/MIN: 10 INJECTION, EMULSION INTRAVENOUS at 23:41

## 2021-01-01 RX ADMIN — FENTANYL CITRATE 175 MCG/HR: 50 INJECTION INTRAVENOUS at 04:28

## 2021-01-01 RX ADMIN — DEXAMETHASONE SODIUM PHOSPHATE 6 MG: 10 INJECTION INTRAMUSCULAR; INTRAVENOUS at 17:14

## 2021-01-01 RX ADMIN — SODIUM CHLORIDE: 9 INJECTION, SOLUTION INTRAVENOUS at 14:15

## 2021-01-01 RX ADMIN — LACTULOSE 20 G: 20 SOLUTION ORAL at 10:31

## 2021-01-01 RX ADMIN — FENTANYL CITRATE 200 MCG/HR: 50 INJECTION INTRAVENOUS at 05:06

## 2021-01-01 RX ADMIN — LACTULOSE 20 G: 20 SOLUTION ORAL at 14:25

## 2021-01-01 RX ADMIN — SODIUM CHLORIDE: 9 INJECTION, SOLUTION INTRAVENOUS at 00:27

## 2021-01-01 RX ADMIN — CISATRACURIUM BESYLATE 10 MG: 10 INJECTION INTRAVENOUS at 10:32

## 2021-01-01 RX ADMIN — PROPOFOL 50 MCG/KG/MIN: 10 INJECTION, EMULSION INTRAVENOUS at 13:24

## 2021-01-01 RX ADMIN — HEPARIN SODIUM 4000 UNITS: 1000 INJECTION INTRAVENOUS; SUBCUTANEOUS at 17:03

## 2021-01-01 RX ADMIN — SENNOSIDES, DOCUSATE SODIUM 2 TABLET: 8.6; 5 TABLET ORAL at 20:40

## 2021-01-01 RX ADMIN — POLYETHYLENE GLYCOL 3350 17 G: 17 POWDER, FOR SOLUTION ORAL at 11:35

## 2021-01-01 RX ADMIN — DEXTROSE MONOHYDRATE 25 G: 25 INJECTION, SOLUTION INTRAVENOUS at 06:52

## 2021-01-01 RX ADMIN — SENNOSIDES, DOCUSATE SODIUM 2 TABLET: 8.6; 5 TABLET ORAL at 10:31

## 2021-01-01 RX ADMIN — FENTANYL CITRATE 200 MCG/HR: 50 INJECTION INTRAVENOUS at 05:20

## 2021-01-01 RX ADMIN — HEPARIN SODIUM 7500 UNITS: 5000 INJECTION INTRAVENOUS; SUBCUTANEOUS at 22:32

## 2021-01-01 RX ADMIN — SENNOSIDES, DOCUSATE SODIUM 2 TABLET: 8.6; 5 TABLET ORAL at 11:35

## 2021-01-01 RX ADMIN — PANTOPRAZOLE SODIUM 40 MG: 40 INJECTION, POWDER, FOR SOLUTION INTRAVENOUS at 07:50

## 2021-01-01 RX ADMIN — CISATRACURIUM BESYLATE 0.7 MCG/KG/MIN: 10 INJECTION, SOLUTION INTRAVENOUS at 03:11

## 2021-01-01 RX ADMIN — FENTANYL CITRATE 200 MCG/HR: 50 INJECTION INTRAVENOUS at 22:14

## 2021-01-01 RX ADMIN — SENNOSIDES, DOCUSATE SODIUM 2 TABLET: 8.6; 5 TABLET ORAL at 09:09

## 2021-01-01 RX ADMIN — HEPARIN SODIUM 7500 UNITS: 5000 INJECTION INTRAVENOUS; SUBCUTANEOUS at 05:40

## 2021-01-01 RX ADMIN — LACTULOSE 20 G: 20 SOLUTION ORAL at 17:05

## 2021-01-01 RX ADMIN — PANTOPRAZOLE SODIUM 40 MG: 40 INJECTION, POWDER, FOR SOLUTION INTRAVENOUS at 07:47

## 2021-01-01 RX ADMIN — PROPOFOL 50 MCG/KG/MIN: 10 INJECTION, EMULSION INTRAVENOUS at 05:21

## 2021-01-01 RX ADMIN — Medication 10 ML: at 08:45

## 2021-01-01 RX ADMIN — SODIUM CHLORIDE: 9 INJECTION, SOLUTION INTRAVENOUS at 18:56

## 2021-01-01 RX ADMIN — FENTANYL CITRATE 200 MCG/HR: 50 INJECTION INTRAVENOUS at 10:02

## 2021-01-01 RX ADMIN — PANTOPRAZOLE SODIUM 40 MG: 40 INJECTION, POWDER, FOR SOLUTION INTRAVENOUS at 09:05

## 2021-01-01 RX ADMIN — CISATRACURIUM BESYLATE 1.2 MCG/KG/MIN: 10 INJECTION, SOLUTION INTRAVENOUS at 23:14

## 2021-01-01 RX ADMIN — METOCLOPRAMIDE HYDROCHLORIDE 10 MG: 5 INJECTION INTRAMUSCULAR; INTRAVENOUS at 00:32

## 2021-01-01 RX ADMIN — PROPOFOL 50 MCG/KG/MIN: 10 INJECTION, EMULSION INTRAVENOUS at 03:16

## 2021-01-01 RX ADMIN — Medication 5 MCG/MIN: at 14:35

## 2021-01-01 RX ADMIN — PANTOPRAZOLE SODIUM 40 MG: 40 INJECTION, POWDER, FOR SOLUTION INTRAVENOUS at 08:45

## 2021-01-01 RX ADMIN — SODIUM CHLORIDE: 9 INJECTION, SOLUTION INTRAVENOUS at 03:03

## 2021-01-01 RX ADMIN — FENTANYL CITRATE 175 MCG/HR: 50 INJECTION INTRAVENOUS at 19:48

## 2021-01-01 RX ADMIN — INSULIN LISPRO 1 UNITS: 100 INJECTION, SOLUTION INTRAVENOUS; SUBCUTANEOUS at 17:24

## 2021-01-01 RX ADMIN — Medication 10 ML: at 08:02

## 2021-01-01 RX ADMIN — CHLORHEXIDINE GLUCONATE 15 ML: 1.2 RINSE ORAL at 09:10

## 2021-01-01 RX ADMIN — MIDAZOLAM 3 MG/HR: 5 INJECTION, SOLUTION INTRAMUSCULAR; INTRAVENOUS at 23:42

## 2021-01-01 RX ADMIN — HEPARIN SODIUM 7500 UNITS: 5000 INJECTION INTRAVENOUS; SUBCUTANEOUS at 05:47

## 2021-01-01 RX ADMIN — CARBOXYMETHYLCELLULOSE SODIUM 2 DROP: 0.5 SOLUTION/ DROPS OPHTHALMIC at 07:58

## 2021-01-01 RX ADMIN — Medication 10 ML: at 09:00

## 2021-01-01 RX ADMIN — PROPOFOL 51.79 MCG/KG/MIN: 10 INJECTION, EMULSION INTRAVENOUS at 00:26

## 2021-01-01 RX ADMIN — MEROPENEM 500 MG: 500 INJECTION, POWDER, FOR SOLUTION INTRAVENOUS at 10:33

## 2021-01-01 RX ADMIN — Medication 10 ML: at 07:51

## 2021-01-01 RX ADMIN — LACTULOSE 20 G: 20 SOLUTION ORAL at 08:45

## 2021-01-01 RX ADMIN — PROPOFOL 50 MCG/KG/MIN: 10 INJECTION, EMULSION INTRAVENOUS at 23:42

## 2021-01-01 RX ADMIN — CHLORHEXIDINE GLUCONATE 15 ML: 1.2 RINSE ORAL at 09:19

## 2021-01-01 RX ADMIN — METOCLOPRAMIDE HYDROCHLORIDE 10 MG: 5 INJECTION INTRAMUSCULAR; INTRAVENOUS at 17:05

## 2021-01-01 RX ADMIN — CHLORHEXIDINE GLUCONATE 15 ML: 1.2 RINSE ORAL at 21:00

## 2021-01-01 RX ADMIN — POLYETHYLENE GLYCOL 3350 17 G: 17 POWDER, FOR SOLUTION ORAL at 08:45

## 2021-01-01 RX ADMIN — FENTANYL CITRATE 200 MCG/HR: 50 INJECTION INTRAVENOUS at 19:27

## 2021-01-01 RX ADMIN — CHLORHEXIDINE GLUCONATE 15 ML: 1.2 RINSE ORAL at 22:35

## 2021-01-01 RX ADMIN — METOCLOPRAMIDE HYDROCHLORIDE 10 MG: 5 INJECTION INTRAMUSCULAR; INTRAVENOUS at 18:17

## 2021-01-01 RX ADMIN — DEXAMETHASONE SODIUM PHOSPHATE 6 MG: 10 INJECTION INTRAMUSCULAR; INTRAVENOUS at 18:13

## 2021-01-01 RX ADMIN — PROPOFOL 50 MCG/KG/MIN: 10 INJECTION, EMULSION INTRAVENOUS at 08:44

## 2021-01-01 RX ADMIN — PANTOPRAZOLE SODIUM 40 MG: 40 INJECTION, POWDER, FOR SOLUTION INTRAVENOUS at 11:36

## 2021-01-01 RX ADMIN — PANTOPRAZOLE SODIUM 40 MG: 40 INJECTION, POWDER, FOR SOLUTION INTRAVENOUS at 09:09

## 2021-01-01 RX ADMIN — DEXAMETHASONE SODIUM PHOSPHATE 6 MG: 10 INJECTION INTRAMUSCULAR; INTRAVENOUS at 18:33

## 2021-01-01 RX ADMIN — Medication 10 ML: at 20:47

## 2021-01-01 RX ADMIN — MEROPENEM 500 MG: 500 INJECTION, POWDER, FOR SOLUTION INTRAVENOUS at 13:07

## 2021-01-01 RX ADMIN — METOCLOPRAMIDE HYDROCHLORIDE 10 MG: 5 INJECTION INTRAMUSCULAR; INTRAVENOUS at 07:58

## 2021-01-01 RX ADMIN — POLYETHYLENE GLYCOL 3350 17 G: 17 POWDER, FOR SOLUTION ORAL at 07:59

## 2021-01-01 RX ADMIN — CHLORHEXIDINE GLUCONATE 15 ML: 1.2 RINSE ORAL at 19:54

## 2021-01-01 RX ADMIN — POLYETHYLENE GLYCOL 3350 17 G: 17 POWDER, FOR SOLUTION ORAL at 07:58

## 2021-01-01 RX ADMIN — DEXAMETHASONE SODIUM PHOSPHATE 6 MG: 10 INJECTION INTRAMUSCULAR; INTRAVENOUS at 17:24

## 2021-01-01 RX ADMIN — FENTANYL CITRATE 200 MCG/HR: 50 INJECTION INTRAVENOUS at 17:42

## 2021-01-01 RX ADMIN — PROPOFOL 50 MCG/KG/MIN: 10 INJECTION, EMULSION INTRAVENOUS at 17:42

## 2021-01-01 RX ADMIN — POLYETHYLENE GLYCOL 3350 17 G: 17 POWDER, FOR SOLUTION ORAL at 09:10

## 2021-01-01 RX ADMIN — CHLORHEXIDINE GLUCONATE 15 ML: 1.2 RINSE ORAL at 21:59

## 2021-01-01 RX ADMIN — INSULIN LISPRO 1 UNITS: 100 INJECTION, SOLUTION INTRAVENOUS; SUBCUTANEOUS at 13:33

## 2021-01-01 RX ADMIN — PANTOPRAZOLE SODIUM 40 MG: 40 INJECTION, POWDER, FOR SOLUTION INTRAVENOUS at 22:42

## 2021-01-01 RX ADMIN — SODIUM CHLORIDE 1.2 MCG/KG/HR: 9 INJECTION, SOLUTION INTRAVENOUS at 05:33

## 2021-01-01 RX ADMIN — PROPOFOL 40 MCG/KG/MIN: 10 INJECTION, EMULSION INTRAVENOUS at 10:52

## 2021-01-01 RX ADMIN — PROPOFOL 50 MCG/KG/MIN: 10 INJECTION, EMULSION INTRAVENOUS at 02:08

## 2021-01-01 RX ADMIN — SENNOSIDES, DOCUSATE SODIUM 2 TABLET: 8.6; 5 TABLET ORAL at 21:37

## 2021-01-01 RX ADMIN — INSULIN LISPRO 1 UNITS: 100 INJECTION, SOLUTION INTRAVENOUS; SUBCUTANEOUS at 00:28

## 2021-01-01 RX ADMIN — PROPOFOL 40 MCG/KG/MIN: 10 INJECTION, EMULSION INTRAVENOUS at 06:08

## 2021-01-01 RX ADMIN — Medication 10 ML: at 22:01

## 2021-01-01 RX ADMIN — ENOXAPARIN SODIUM 40 MG: 100 INJECTION SUBCUTANEOUS at 12:17

## 2021-01-01 RX ADMIN — Medication 30 MCG/MIN: at 22:50

## 2021-01-01 RX ADMIN — MEROPENEM 500 MG: 500 INJECTION, POWDER, FOR SOLUTION INTRAVENOUS at 12:15

## 2021-01-01 RX ADMIN — PANTOPRAZOLE SODIUM 40 MG: 40 INJECTION, POWDER, FOR SOLUTION INTRAVENOUS at 07:58

## 2021-01-01 RX ADMIN — MEROPENEM 500 MG: 500 INJECTION, POWDER, FOR SOLUTION INTRAVENOUS at 12:17

## 2021-01-01 RX ADMIN — FENTANYL CITRATE 200 MCG/HR: 50 INJECTION INTRAVENOUS at 12:02

## 2021-01-01 RX ADMIN — CHLORHEXIDINE GLUCONATE 15 ML: 1.2 RINSE ORAL at 12:17

## 2021-01-01 RX ADMIN — CHLORHEXIDINE GLUCONATE 15 ML: 1.2 RINSE ORAL at 11:36

## 2021-01-01 RX ADMIN — CHLORHEXIDINE GLUCONATE 15 ML: 1.2 RINSE ORAL at 20:40

## 2021-01-01 RX ADMIN — PROPOFOL 50 MCG/KG/MIN: 10 INJECTION, EMULSION INTRAVENOUS at 02:42

## 2021-01-01 RX ADMIN — INSULIN LISPRO 1 UNITS: 100 INJECTION, SOLUTION INTRAVENOUS; SUBCUTANEOUS at 11:45

## 2021-01-01 RX ADMIN — PANTOPRAZOLE SODIUM 40 MG: 40 INJECTION, POWDER, FOR SOLUTION INTRAVENOUS at 20:47

## 2021-01-01 RX ADMIN — HEPARIN SODIUM 7500 UNITS: 5000 INJECTION INTRAVENOUS; SUBCUTANEOUS at 04:31

## 2021-01-01 RX ADMIN — ROCURONIUM BROMIDE 100 MG: 10 INJECTION INTRAVENOUS at 17:24

## 2021-01-01 RX ADMIN — INSULIN LISPRO 1 UNITS: 100 INJECTION, SOLUTION INTRAVENOUS; SUBCUTANEOUS at 23:20

## 2021-01-01 RX ADMIN — PROPOFOL 50 MCG/KG/MIN: 10 INJECTION, EMULSION INTRAVENOUS at 01:38

## 2021-01-01 RX ADMIN — CARBOXYMETHYLCELLULOSE SODIUM 2 DROP: 0.5 SOLUTION/ DROPS OPHTHALMIC at 20:41

## 2021-01-01 RX ADMIN — INSULIN LISPRO 1 UNITS: 100 INJECTION, SOLUTION INTRAVENOUS; SUBCUTANEOUS at 05:26

## 2021-01-01 RX ADMIN — SODIUM ZIRCONIUM CYCLOSILICATE 10 G: 10 POWDER, FOR SUSPENSION ORAL at 12:18

## 2021-01-01 RX ADMIN — LACTULOSE 20 G: 20 SOLUTION ORAL at 13:11

## 2021-01-01 RX ADMIN — PROPOFOL 50 MCG/KG/MIN: 10 INJECTION, EMULSION INTRAVENOUS at 14:41

## 2021-01-01 RX ADMIN — SODIUM ZIRCONIUM CYCLOSILICATE 10 G: 10 POWDER, FOR SUSPENSION ORAL at 16:38

## 2021-01-01 RX ADMIN — INSULIN HUMAN 10 UNITS: 100 INJECTION, SOLUTION PARENTERAL at 06:52

## 2021-01-01 RX ADMIN — MEROPENEM 500 MG: 500 INJECTION, POWDER, FOR SOLUTION INTRAVENOUS at 12:24

## 2021-01-01 RX ADMIN — POLYETHYLENE GLYCOL 3350 17 G: 17 POWDER, FOR SOLUTION ORAL at 12:18

## 2021-01-01 RX ADMIN — SODIUM CHLORIDE, POTASSIUM CHLORIDE, SODIUM LACTATE AND CALCIUM CHLORIDE 250 ML: 600; 310; 30; 20 INJECTION, SOLUTION INTRAVENOUS at 13:37

## 2021-01-01 RX ADMIN — SENNOSIDES, DOCUSATE SODIUM 2 TABLET: 8.6; 5 TABLET ORAL at 22:28

## 2021-01-01 RX ADMIN — SENNOSIDES, DOCUSATE SODIUM 2 TABLET: 8.6; 5 TABLET ORAL at 09:06

## 2021-01-01 RX ADMIN — PROPOFOL 50 MCG/KG/MIN: 10 INJECTION, EMULSION INTRAVENOUS at 04:13

## 2021-01-01 RX ADMIN — LACTULOSE 20 G: 20 SOLUTION ORAL at 18:17

## 2021-01-01 RX ADMIN — HYDRALAZINE HYDROCHLORIDE 10 MG: 20 INJECTION INTRAMUSCULAR; INTRAVENOUS at 04:49

## 2021-01-01 RX ADMIN — HEPARIN SODIUM 7500 UNITS: 5000 INJECTION INTRAVENOUS; SUBCUTANEOUS at 13:11

## 2021-01-01 RX ADMIN — PROPOFOL 50 MCG/KG/MIN: 10 INJECTION, EMULSION INTRAVENOUS at 11:50

## 2021-01-01 RX ADMIN — SENNOSIDES, DOCUSATE SODIUM 2 TABLET: 8.6; 5 TABLET ORAL at 19:54

## 2021-01-01 RX ADMIN — SODIUM CHLORIDE 250 ML: 9 INJECTION, SOLUTION INTRAVENOUS at 11:02

## 2021-01-01 RX ADMIN — PROPOFOL 50 MCG/KG/MIN: 10 INJECTION, EMULSION INTRAVENOUS at 10:03

## 2021-01-01 RX ADMIN — PANTOPRAZOLE SODIUM 40 MG: 40 INJECTION, POWDER, FOR SOLUTION INTRAVENOUS at 22:30

## 2021-01-01 RX ADMIN — MIDAZOLAM 2 MG/HR: 5 INJECTION, SOLUTION INTRAMUSCULAR; INTRAVENOUS at 02:41

## 2021-01-01 RX ADMIN — PANTOPRAZOLE SODIUM 40 MG: 40 INJECTION, POWDER, FOR SOLUTION INTRAVENOUS at 20:10

## 2021-01-01 RX ADMIN — PROPOFOL 50 MCG/KG/MIN: 10 INJECTION, EMULSION INTRAVENOUS at 15:26

## 2021-01-01 RX ADMIN — FENTANYL CITRATE 50 MCG/HR: 50 INJECTION INTRAVENOUS at 20:42

## 2021-01-01 RX ADMIN — INSULIN LISPRO 1 UNITS: 100 INJECTION, SOLUTION INTRAVENOUS; SUBCUTANEOUS at 18:31

## 2021-01-01 RX ADMIN — HEPARIN SODIUM 2500 UNITS: 1000 INJECTION INTRAVENOUS; SUBCUTANEOUS at 11:50

## 2021-01-01 RX ADMIN — PROPOFOL 50 MCG/KG/MIN: 10 INJECTION, EMULSION INTRAVENOUS at 18:12

## 2021-01-01 RX ADMIN — METOCLOPRAMIDE HYDROCHLORIDE 10 MG: 5 INJECTION INTRAMUSCULAR; INTRAVENOUS at 07:50

## 2021-01-01 RX ADMIN — MIDAZOLAM 2 MG/HR: 5 INJECTION, SOLUTION INTRAMUSCULAR; INTRAVENOUS at 12:00

## 2021-01-01 RX ADMIN — HEPARIN SODIUM 12.4 UNITS/KG/HR: 5000 INJECTION, SOLUTION INTRAVENOUS; SUBCUTANEOUS at 11:54

## 2021-01-01 RX ADMIN — PANTOPRAZOLE SODIUM 40 MG: 40 INJECTION, POWDER, FOR SOLUTION INTRAVENOUS at 20:40

## 2021-01-01 RX ADMIN — PROPOFOL 45 MCG/KG/MIN: 10 INJECTION, EMULSION INTRAVENOUS at 14:01

## 2021-01-01 RX ADMIN — PROPOFOL 50 MCG/KG/MIN: 10 INJECTION, EMULSION INTRAVENOUS at 02:59

## 2021-01-01 RX ADMIN — Medication 10 ML: at 20:11

## 2021-01-01 RX ADMIN — FENTANYL CITRATE 200 MCG/HR: 50 INJECTION INTRAVENOUS at 16:15

## 2021-01-01 RX ADMIN — HEPARIN SODIUM 8 UNITS/KG/HR: 5000 INJECTION, SOLUTION INTRAVENOUS; SUBCUTANEOUS at 17:03

## 2021-01-01 RX ADMIN — METOCLOPRAMIDE HYDROCHLORIDE 10 MG: 5 INJECTION INTRAMUSCULAR; INTRAVENOUS at 09:09

## 2021-01-01 RX ADMIN — CHLORHEXIDINE GLUCONATE 15 ML: 1.2 RINSE ORAL at 20:10

## 2021-01-01 RX ADMIN — PROPOFOL 50 MCG/KG/MIN: 10 INJECTION, EMULSION INTRAVENOUS at 19:47

## 2021-01-01 RX ADMIN — SODIUM ZIRCONIUM CYCLOSILICATE 10 G: 10 POWDER, FOR SUSPENSION ORAL at 10:31

## 2021-01-01 RX ADMIN — PROPOFOL 40 MCG/KG/MIN: 10 INJECTION, EMULSION INTRAVENOUS at 12:17

## 2021-01-01 RX ADMIN — METOCLOPRAMIDE HYDROCHLORIDE 10 MG: 5 INJECTION INTRAMUSCULAR; INTRAVENOUS at 00:24

## 2021-01-01 RX ADMIN — Medication 1000 ML: at 02:28

## 2021-01-01 RX ADMIN — REMDESIVIR 200 MG: 100 INJECTION, POWDER, LYOPHILIZED, FOR SOLUTION INTRAVENOUS at 21:05

## 2021-01-01 RX ADMIN — ETOMIDATE 20 MG: 2 INJECTION, SOLUTION INTRAVENOUS at 17:24

## 2021-01-01 RX ADMIN — HEPARIN SODIUM 7500 UNITS: 5000 INJECTION INTRAVENOUS; SUBCUTANEOUS at 22:00

## 2021-01-01 RX ADMIN — MIDAZOLAM 4 MG: 1 INJECTION INTRAMUSCULAR; INTRAVENOUS at 12:01

## 2021-01-01 RX ADMIN — METOCLOPRAMIDE HYDROCHLORIDE 10 MG: 5 INJECTION INTRAMUSCULAR; INTRAVENOUS at 00:20

## 2021-01-01 RX ADMIN — PROPOFOL 50 MCG/KG/MIN: 10 INJECTION, EMULSION INTRAVENOUS at 08:21

## 2021-01-01 RX ADMIN — HEPARIN SODIUM 8.02 UNITS/KG/HR: 5000 INJECTION, SOLUTION INTRAVENOUS; SUBCUTANEOUS at 19:56

## 2021-01-01 RX ADMIN — SODIUM CHLORIDE 1000 ML: 9 INJECTION, SOLUTION INTRAVENOUS at 16:33

## 2021-01-01 RX ADMIN — VANCOMYCIN HYDROCHLORIDE 2000 MG: 1 INJECTION, POWDER, LYOPHILIZED, FOR SOLUTION INTRAVENOUS at 13:24

## 2021-01-01 RX ADMIN — SENNOSIDES, DOCUSATE SODIUM 2 TABLET: 8.6; 5 TABLET ORAL at 21:59

## 2021-01-01 RX ADMIN — PANTOPRAZOLE SODIUM 40 MG: 40 INJECTION, POWDER, FOR SOLUTION INTRAVENOUS at 19:00

## 2021-01-01 RX ADMIN — LABETALOL HYDROCHLORIDE 40 MG: 5 INJECTION, SOLUTION INTRAVENOUS at 18:06

## 2021-01-01 RX ADMIN — CISATRACURIUM BESYLATE 2 MCG/KG/MIN: 10 INJECTION, SOLUTION INTRAVENOUS at 10:32

## 2021-01-01 RX ADMIN — SODIUM ZIRCONIUM CYCLOSILICATE 10 G: 10 POWDER, FOR SUSPENSION ORAL at 20:27

## 2021-01-01 RX ADMIN — METOCLOPRAMIDE HYDROCHLORIDE 10 MG: 5 INJECTION INTRAMUSCULAR; INTRAVENOUS at 11:36

## 2021-01-01 RX ADMIN — METOCLOPRAMIDE HYDROCHLORIDE 10 MG: 5 INJECTION INTRAMUSCULAR; INTRAVENOUS at 17:24

## 2021-01-01 RX ADMIN — FENTANYL CITRATE 200 MCG/HR: 50 INJECTION INTRAVENOUS at 00:22

## 2021-01-01 RX ADMIN — PROPOFOL 7.25 MCG/KG/MIN: 10 INJECTION, EMULSION INTRAVENOUS at 17:35

## 2021-01-01 RX ADMIN — METOCLOPRAMIDE HYDROCHLORIDE 10 MG: 5 INJECTION INTRAMUSCULAR; INTRAVENOUS at 08:49

## 2021-01-01 RX ADMIN — MEROPENEM 500 MG: 500 INJECTION, POWDER, FOR SOLUTION INTRAVENOUS at 11:40

## 2021-01-01 RX ADMIN — DEXAMETHASONE SODIUM PHOSPHATE 6 MG: 10 INJECTION INTRAMUSCULAR; INTRAVENOUS at 18:17

## 2021-01-01 RX ADMIN — FENTANYL CITRATE 200 MCG/HR: 50 INJECTION INTRAVENOUS at 14:35

## 2021-01-01 RX ADMIN — LACTULOSE 20 G: 20 SOLUTION ORAL at 09:06

## 2021-01-01 RX ADMIN — CARBOXYMETHYLCELLULOSE SODIUM 2 DROP: 0.5 SOLUTION/ DROPS OPHTHALMIC at 18:57

## 2021-01-01 RX ADMIN — PROPOFOL 50 MCG/KG/MIN: 10 INJECTION, EMULSION INTRAVENOUS at 04:50

## 2021-01-01 RX ADMIN — MIDAZOLAM 2 MG/HR: 5 INJECTION, SOLUTION INTRAMUSCULAR; INTRAVENOUS at 21:37

## 2021-01-01 RX ADMIN — HEPARIN SODIUM 7500 UNITS: 5000 INJECTION INTRAVENOUS; SUBCUTANEOUS at 06:08

## 2021-01-01 RX ADMIN — HEPARIN SODIUM 7500 UNITS: 5000 INJECTION INTRAVENOUS; SUBCUTANEOUS at 00:32

## 2021-01-01 RX ADMIN — DEXAMETHASONE SODIUM PHOSPHATE 6 MG: 10 INJECTION INTRAMUSCULAR; INTRAVENOUS at 18:02

## 2021-01-01 RX ADMIN — FUROSEMIDE 100 MG: 100 INJECTION, SOLUTION INTRAMUSCULAR; INTRAVENOUS at 20:27

## 2021-01-01 RX ADMIN — PROPOFOL 45 MCG/KG/MIN: 10 INJECTION, EMULSION INTRAVENOUS at 10:52

## 2021-01-01 RX ADMIN — DEXAMETHASONE SODIUM PHOSPHATE 6 MG: 10 INJECTION INTRAMUSCULAR; INTRAVENOUS at 16:33

## 2021-01-01 RX ADMIN — PROPOFOL 40 MCG/KG/MIN: 10 INJECTION, EMULSION INTRAVENOUS at 21:23

## 2021-01-01 RX ADMIN — METOCLOPRAMIDE HYDROCHLORIDE 10 MG: 5 INJECTION INTRAMUSCULAR; INTRAVENOUS at 09:06

## 2021-01-01 RX ADMIN — CHLORHEXIDINE GLUCONATE 15 ML: 1.2 RINSE ORAL at 20:47

## 2021-01-01 RX ADMIN — PROPOFOL 50 MCG/KG/MIN: 10 INJECTION, EMULSION INTRAVENOUS at 16:54

## 2021-01-01 RX ADMIN — MIDAZOLAM HYDROCHLORIDE 2 MG: 1 INJECTION, SOLUTION INTRAMUSCULAR; INTRAVENOUS at 20:54

## 2021-01-01 RX ADMIN — PROPOFOL 30 MCG/KG/MIN: 10 INJECTION, EMULSION INTRAVENOUS at 21:55

## 2021-01-01 RX ADMIN — CHLORHEXIDINE GLUCONATE 15 ML: 1.2 RINSE ORAL at 08:44

## 2021-01-01 RX ADMIN — PROPOFOL 50 MCG/KG/MIN: 10 INJECTION, EMULSION INTRAVENOUS at 00:21

## 2021-01-01 RX ADMIN — PROPOFOL 50 MCG/KG/MIN: 10 INJECTION, EMULSION INTRAVENOUS at 20:40

## 2021-01-01 RX ADMIN — PROPOFOL 50 MCG/KG/MIN: 10 INJECTION, EMULSION INTRAVENOUS at 22:28

## 2021-01-01 RX ADMIN — TOCILIZUMAB 810 MG: 180 INJECTION, SOLUTION SUBCUTANEOUS at 10:31

## 2021-01-01 RX ADMIN — HEPARIN SODIUM 7500 UNITS: 5000 INJECTION INTRAVENOUS; SUBCUTANEOUS at 13:47

## 2021-01-01 RX ADMIN — Medication 10 ML: at 20:28

## 2021-01-01 RX ADMIN — HYDRALAZINE HYDROCHLORIDE 10 MG: 20 INJECTION INTRAMUSCULAR; INTRAVENOUS at 11:26

## 2021-01-01 RX ADMIN — Medication 3 MCG/MIN: at 03:31

## 2021-01-01 RX ADMIN — CHLORHEXIDINE GLUCONATE 15 ML: 1.2 RINSE ORAL at 08:00

## 2021-01-01 RX ADMIN — FENTANYL CITRATE 200 MCG/HR: 50 INJECTION INTRAVENOUS at 02:30

## 2021-01-01 RX ADMIN — CHLORHEXIDINE GLUCONATE 15 ML: 1.2 RINSE ORAL at 07:50

## 2021-01-01 RX ADMIN — PROPOFOL 50 MCG/KG/MIN: 10 INJECTION, EMULSION INTRAVENOUS at 21:47

## 2021-01-01 RX ADMIN — PROPOFOL 50 MCG/KG/MIN: 10 INJECTION, EMULSION INTRAVENOUS at 18:18

## 2021-01-01 RX ADMIN — LACTULOSE 20 G: 20 SOLUTION ORAL at 19:54

## 2021-01-01 RX ADMIN — HEPARIN SODIUM 4000 UNITS: 1000 INJECTION, SOLUTION INTRAVENOUS; SUBCUTANEOUS at 15:17

## 2021-01-01 RX ADMIN — PROPOFOL 50 MCG/KG/MIN: 10 INJECTION, EMULSION INTRAVENOUS at 21:35

## 2021-01-01 RX ADMIN — SENNOSIDES, DOCUSATE SODIUM 2 TABLET: 8.6; 5 TABLET ORAL at 20:10

## 2021-01-01 RX ADMIN — HEPARIN SODIUM 2000 UNITS: 1000 INJECTION, SOLUTION INTRAVENOUS; SUBCUTANEOUS at 07:05

## 2021-01-01 RX ADMIN — VANCOMYCIN HYDROCHLORIDE 1250 MG: 5 INJECTION, POWDER, LYOPHILIZED, FOR SOLUTION INTRAVENOUS at 18:18

## 2021-01-01 RX ADMIN — METOCLOPRAMIDE HYDROCHLORIDE 10 MG: 5 INJECTION INTRAMUSCULAR; INTRAVENOUS at 08:01

## 2021-01-01 RX ADMIN — CARBOXYMETHYLCELLULOSE SODIUM 2 DROP: 0.5 SOLUTION/ DROPS OPHTHALMIC at 20:11

## 2021-01-01 RX ADMIN — Medication 4.05 MCG/MIN: at 04:04

## 2021-01-01 RX ADMIN — METOCLOPRAMIDE HYDROCHLORIDE 10 MG: 5 INJECTION INTRAMUSCULAR; INTRAVENOUS at 15:13

## 2021-01-01 RX ADMIN — PROPOFOL 50 MCG/KG/MIN: 10 INJECTION, EMULSION INTRAVENOUS at 05:24

## 2021-01-01 RX ADMIN — HEPARIN SODIUM 7500 UNITS: 5000 INJECTION INTRAVENOUS; SUBCUTANEOUS at 14:25

## 2021-01-01 ASSESSMENT — PULMONARY FUNCTION TESTS
PIF_VALUE: 37
PIF_VALUE: 37
PIF_VALUE: 33
PIF_VALUE: 50
PIF_VALUE: 45
PIF_VALUE: 47
PIF_VALUE: 28
PIF_VALUE: 35
PIF_VALUE: 47
PIF_VALUE: 47
PIF_VALUE: 48
PIF_VALUE: 47
PIF_VALUE: 39
PIF_VALUE: 41
PIF_VALUE: 43
PIF_VALUE: 37
PIF_VALUE: 23
PIF_VALUE: 42
PIF_VALUE: 37
PIF_VALUE: 42
PIF_VALUE: 31
PIF_VALUE: 37
PIF_VALUE: 41
PIF_VALUE: 47
PIF_VALUE: 42
PIF_VALUE: 37
PIF_VALUE: 43
PIF_VALUE: 43
PIF_VALUE: 26
PIF_VALUE: 37
PIF_VALUE: 51
PIF_VALUE: 38
PIF_VALUE: 47
PIF_VALUE: 51
PIF_VALUE: 45
PIF_VALUE: 47
PIF_VALUE: 43
PIF_VALUE: 48
PIF_VALUE: 43
PIF_VALUE: 47
PIF_VALUE: 35
PIF_VALUE: 50
PIF_VALUE: 48
PIF_VALUE: 46
PIF_VALUE: 38
PIF_VALUE: 38
PIF_VALUE: 42
PIF_VALUE: 43
PIF_VALUE: 43
PIF_VALUE: 47
PIF_VALUE: 41
PIF_VALUE: 46
PIF_VALUE: 38
PIF_VALUE: 37
PIF_VALUE: 38
PIF_VALUE: 51
PIF_VALUE: 46
PIF_VALUE: 47
PIF_VALUE: 41
PIF_VALUE: 40
PIF_VALUE: 36
PIF_VALUE: 40
PIF_VALUE: 41
PIF_VALUE: 37
PIF_VALUE: 38
PIF_VALUE: 40
PIF_VALUE: 41
PIF_VALUE: 29
PIF_VALUE: 37
PIF_VALUE: 34
PIF_VALUE: 44
PIF_VALUE: 43
PIF_VALUE: 46
PIF_VALUE: 43
PIF_VALUE: 38
PIF_VALUE: 47
PIF_VALUE: 47
PIF_VALUE: 38
PIF_VALUE: 37
PIF_VALUE: 40
PIF_VALUE: 44
PIF_VALUE: 51
PIF_VALUE: 37
PIF_VALUE: 34
PIF_VALUE: 37
PIF_VALUE: 41
PIF_VALUE: 47
PIF_VALUE: 34
PIF_VALUE: 41
PIF_VALUE: 37
PIF_VALUE: 38
PIF_VALUE: 43
PIF_VALUE: 46
PIF_VALUE: 36
PIF_VALUE: 37
PIF_VALUE: 43
PIF_VALUE: 51
PIF_VALUE: 48
PIF_VALUE: 43
PIF_VALUE: 47
PIF_VALUE: 41
PIF_VALUE: 35
PIF_VALUE: 47
PIF_VALUE: 42
PIF_VALUE: 35
PIF_VALUE: 47
PIF_VALUE: 46
PIF_VALUE: 49
PIF_VALUE: 46
PIF_VALUE: 37
PIF_VALUE: 47
PIF_VALUE: 37
PIF_VALUE: 49
PIF_VALUE: 48
PIF_VALUE: 39
PIF_VALUE: 47
PIF_VALUE: 40
PIF_VALUE: 38
PIF_VALUE: 43
PIF_VALUE: 34
PIF_VALUE: 34
PIF_VALUE: 47
PIF_VALUE: 40
PIF_VALUE: 43
PIF_VALUE: 49
PIF_VALUE: 41
PIF_VALUE: 43
PIF_VALUE: 41
PIF_VALUE: 37
PIF_VALUE: 38
PIF_VALUE: 47
PIF_VALUE: 41
PIF_VALUE: 48
PIF_VALUE: 49
PIF_VALUE: 46
PIF_VALUE: 47
PIF_VALUE: 41
PIF_VALUE: 51
PIF_VALUE: 46
PIF_VALUE: 26
PIF_VALUE: 47
PIF_VALUE: 43
PIF_VALUE: 41
PIF_VALUE: 42
PIF_VALUE: 45
PIF_VALUE: 49
PIF_VALUE: 42
PIF_VALUE: 41
PIF_VALUE: 47
PIF_VALUE: 28
PIF_VALUE: 38
PIF_VALUE: 35
PIF_VALUE: 37
PIF_VALUE: 39
PIF_VALUE: 37
PIF_VALUE: 38
PIF_VALUE: 47
PIF_VALUE: 37
PIF_VALUE: 46
PIF_VALUE: 43
PIF_VALUE: 41
PIF_VALUE: 40
PIF_VALUE: 45
PIF_VALUE: 41
PIF_VALUE: 41
PIF_VALUE: 37
PIF_VALUE: 47
PIF_VALUE: 41
PIF_VALUE: 49
PIF_VALUE: 50
PIF_VALUE: 38
PIF_VALUE: 37
PIF_VALUE: 43
PIF_VALUE: 40
PIF_VALUE: 23
PIF_VALUE: 41
PIF_VALUE: 43
PIF_VALUE: 48
PIF_VALUE: 38
PIF_VALUE: 51
PIF_VALUE: 49
PIF_VALUE: 50
PIF_VALUE: 51
PIF_VALUE: 47
PIF_VALUE: 42
PIF_VALUE: 47
PIF_VALUE: 41
PIF_VALUE: 18
PIF_VALUE: 43
PIF_VALUE: 48
PIF_VALUE: 41
PIF_VALUE: 44
PIF_VALUE: 48
PIF_VALUE: 48
PIF_VALUE: 37
PIF_VALUE: 50
PIF_VALUE: 39
PIF_VALUE: 47
PIF_VALUE: 41
PIF_VALUE: 47
PIF_VALUE: 44
PIF_VALUE: 48
PIF_VALUE: 47
PIF_VALUE: 48
PIF_VALUE: 51
PIF_VALUE: 46
PIF_VALUE: 37
PIF_VALUE: 51
PIF_VALUE: 41
PIF_VALUE: 28
PIF_VALUE: 40
PIF_VALUE: 34
PIF_VALUE: 42
PIF_VALUE: 45
PIF_VALUE: 42
PIF_VALUE: 48
PIF_VALUE: 37
PIF_VALUE: 26
PIF_VALUE: 37
PIF_VALUE: 51
PIF_VALUE: 37
PIF_VALUE: 35
PIF_VALUE: 41
PIF_VALUE: 47
PIF_VALUE: 36
PIF_VALUE: 47
PIF_VALUE: 42
PIF_VALUE: 32
PIF_VALUE: 38
PIF_VALUE: 29
PIF_VALUE: 37
PIF_VALUE: 37
PIF_VALUE: 47
PIF_VALUE: 35
PIF_VALUE: 47
PIF_VALUE: 47
PIF_VALUE: 44
PIF_VALUE: 37
PIF_VALUE: 48
PIF_VALUE: 49
PIF_VALUE: 41
PIF_VALUE: 44
PIF_VALUE: 37
PIF_VALUE: 43
PIF_VALUE: 43
PIF_VALUE: 38
PIF_VALUE: 41
PIF_VALUE: 32
PIF_VALUE: 42
PIF_VALUE: 42
PIF_VALUE: 41
PIF_VALUE: 43
PIF_VALUE: 38
PIF_VALUE: 46
PIF_VALUE: 49
PIF_VALUE: 34
PIF_VALUE: 48
PIF_VALUE: 48
PIF_VALUE: 36
PIF_VALUE: 35
PIF_VALUE: 42
PIF_VALUE: 37
PIF_VALUE: 47
PIF_VALUE: 27
PIF_VALUE: 41
PIF_VALUE: 40
PIF_VALUE: 41
PIF_VALUE: 49
PIF_VALUE: 48
PIF_VALUE: 42
PIF_VALUE: 40
PIF_VALUE: 39
PIF_VALUE: 47
PIF_VALUE: 39
PIF_VALUE: 41
PIF_VALUE: 30
PIF_VALUE: 47
PIF_VALUE: 37
PIF_VALUE: 41
PIF_VALUE: 38
PIF_VALUE: 46
PIF_VALUE: 33
PIF_VALUE: 45
PIF_VALUE: 48
PIF_VALUE: 46
PIF_VALUE: 46
PIF_VALUE: 38
PIF_VALUE: 26
PIF_VALUE: 41
PIF_VALUE: 28
PIF_VALUE: 39
PIF_VALUE: 40
PIF_VALUE: 46
PIF_VALUE: 38
PIF_VALUE: 40
PIF_VALUE: 41
PIF_VALUE: 37
PIF_VALUE: 35
PIF_VALUE: 46
PIF_VALUE: 46
PIF_VALUE: 41
PIF_VALUE: 43
PIF_VALUE: 41
PIF_VALUE: 47
PIF_VALUE: 43
PIF_VALUE: 47
PIF_VALUE: 41
PIF_VALUE: 32
PIF_VALUE: 38
PIF_VALUE: 47
PIF_VALUE: 39
PIF_VALUE: 48
PIF_VALUE: 39
PIF_VALUE: 39
PIF_VALUE: 42
PIF_VALUE: 47
PIF_VALUE: 48
PIF_VALUE: 43
PIF_VALUE: 43
PIF_VALUE: 41
PIF_VALUE: 25
PIF_VALUE: 42
PIF_VALUE: 42
PIF_VALUE: 39
PIF_VALUE: 41
PIF_VALUE: 50
PIF_VALUE: 43
PIF_VALUE: 22
PIF_VALUE: 46
PIF_VALUE: 45
PIF_VALUE: 43
PIF_VALUE: 47
PIF_VALUE: 37
PIF_VALUE: 50
PIF_VALUE: 48
PIF_VALUE: 48
PIF_VALUE: 34
PIF_VALUE: 38
PIF_VALUE: 48
PIF_VALUE: 29
PIF_VALUE: 48
PIF_VALUE: 43
PIF_VALUE: 51
PIF_VALUE: 41
PIF_VALUE: 32
PIF_VALUE: 50
PIF_VALUE: 42
PIF_VALUE: 27
PIF_VALUE: 38
PIF_VALUE: 37
PIF_VALUE: 49
PIF_VALUE: 25
PIF_VALUE: 50
PIF_VALUE: 45
PIF_VALUE: 48
PIF_VALUE: 50
PIF_VALUE: 36
PIF_VALUE: 20
PIF_VALUE: 47
PIF_VALUE: 41
PIF_VALUE: 40
PIF_VALUE: 42
PIF_VALUE: 34
PIF_VALUE: 51
PIF_VALUE: 47
PIF_VALUE: 47
PIF_VALUE: 26
PIF_VALUE: 41
PIF_VALUE: 33
PIF_VALUE: 45
PIF_VALUE: 47
PIF_VALUE: 44
PIF_VALUE: 42
PIF_VALUE: 47
PIF_VALUE: 36
PIF_VALUE: 46
PIF_VALUE: 43
PIF_VALUE: 42
PIF_VALUE: 47
PIF_VALUE: 42
PIF_VALUE: 48
PIF_VALUE: 23
PIF_VALUE: 27
PIF_VALUE: 34
PIF_VALUE: 35
PIF_VALUE: 41
PIF_VALUE: 33
PIF_VALUE: 45
PIF_VALUE: 41
PIF_VALUE: 40
PIF_VALUE: 46
PIF_VALUE: 37
PIF_VALUE: 32
PIF_VALUE: 36
PIF_VALUE: 37
PIF_VALUE: 48
PIF_VALUE: 42
PIF_VALUE: 39
PIF_VALUE: 47
PIF_VALUE: 48
PIF_VALUE: 43
PIF_VALUE: 38
PIF_VALUE: 38
PIF_VALUE: 46
PIF_VALUE: 41
PIF_VALUE: 38
PIF_VALUE: 37
PIF_VALUE: 43
PIF_VALUE: 48
PIF_VALUE: 46
PIF_VALUE: 50
PIF_VALUE: 37
PIF_VALUE: 36
PIF_VALUE: 47
PIF_VALUE: 38
PIF_VALUE: 49
PIF_VALUE: 49
PIF_VALUE: 40
PIF_VALUE: 41
PIF_VALUE: 42
PIF_VALUE: 35
PIF_VALUE: 42
PIF_VALUE: 40
PIF_VALUE: 37
PIF_VALUE: 43
PIF_VALUE: 47
PIF_VALUE: 50
PIF_VALUE: 41
PIF_VALUE: 41
PIF_VALUE: 36
PIF_VALUE: 37
PIF_VALUE: 21
PIF_VALUE: 46
PIF_VALUE: 43
PIF_VALUE: 39
PIF_VALUE: 44
PIF_VALUE: 43
PIF_VALUE: 41
PIF_VALUE: 26
PIF_VALUE: 32
PIF_VALUE: 46
PIF_VALUE: 26
PIF_VALUE: 38
PIF_VALUE: 41
PIF_VALUE: 41
PIF_VALUE: 40
PIF_VALUE: 44
PIF_VALUE: 37
PIF_VALUE: 42
PIF_VALUE: 32
PIF_VALUE: 27
PIF_VALUE: 38
PIF_VALUE: 47
PIF_VALUE: 38
PIF_VALUE: 36
PIF_VALUE: 37
PIF_VALUE: 41
PIF_VALUE: 36
PIF_VALUE: 46
PIF_VALUE: 38
PIF_VALUE: 38
PIF_VALUE: 47
PIF_VALUE: 50
PIF_VALUE: 38
PIF_VALUE: 42
PIF_VALUE: 42
PIF_VALUE: 44
PIF_VALUE: 38
PIF_VALUE: 38
PIF_VALUE: 42
PIF_VALUE: 48
PIF_VALUE: 40
PIF_VALUE: 27
PIF_VALUE: 32
PIF_VALUE: 37
PIF_VALUE: 42
PIF_VALUE: 32
PIF_VALUE: 47
PIF_VALUE: 37
PIF_VALUE: 44
PIF_VALUE: 47
PIF_VALUE: 43
PIF_VALUE: 38
PIF_VALUE: 37
PIF_VALUE: 49
PIF_VALUE: 47
PIF_VALUE: 47
PIF_VALUE: 42
PIF_VALUE: 39
PIF_VALUE: 42
PIF_VALUE: 40
PIF_VALUE: 38
PIF_VALUE: 50
PIF_VALUE: 37
PIF_VALUE: 29
PIF_VALUE: 41
PIF_VALUE: 25
PIF_VALUE: 37
PIF_VALUE: 44
PIF_VALUE: 41
PIF_VALUE: 37
PIF_VALUE: 45
PIF_VALUE: 38
PIF_VALUE: 47
PIF_VALUE: 47
PIF_VALUE: 48
PIF_VALUE: 39
PIF_VALUE: 47
PIF_VALUE: 41
PIF_VALUE: 43
PIF_VALUE: 48
PIF_VALUE: 37
PIF_VALUE: 35
PIF_VALUE: 47
PIF_VALUE: 43
PIF_VALUE: 48
PIF_VALUE: 39
PIF_VALUE: 42
PIF_VALUE: 48

## 2021-01-01 ASSESSMENT — PAIN SCALES - GENERAL
PAINLEVEL_OUTOF10: 0
PAINLEVEL_OUTOF10: 5
PAINLEVEL_OUTOF10: 0

## 2021-01-01 ASSESSMENT — PAIN DESCRIPTION - LOCATION: LOCATION: CHEST

## 2021-01-01 ASSESSMENT — ENCOUNTER SYMPTOMS
ABDOMINAL PAIN: 0
COUGH: 1
PHOTOPHOBIA: 0
SORE THROAT: 0
EYE PAIN: 0
DIARRHEA: 0
NAUSEA: 0
SHORTNESS OF BREATH: 1
VOMITING: 0
BACK PAIN: 0
RHINORRHEA: 0

## 2021-01-01 ASSESSMENT — PAIN DESCRIPTION - PAIN TYPE: TYPE: ACUTE PAIN

## 2021-12-22 PROBLEM — J96.01 ACUTE RESPIRATORY FAILURE WITH HYPOXIA (HCC): Status: ACTIVE | Noted: 2021-01-01

## 2021-12-22 NOTE — H&P
Patient is 51-year-old man all information was obtained from the ER physician and ER physician assistant. Was diagnosed with Covid a week ago at urgent care clinic comes in shortness of breath. Patient was hypoxic upon arrival was intubated for respiratory failure. Patient's blood pressure was 260/140 spoke with ER doctor to give one dose of labetalol now. And needs a CT scan prior to going to ICU through the [facility] hemorrhagic bleed due to elevated blood pressure. Patient is sedated on the propofol at this time. Has two IV access, as per ER physician patient did not get vaccinated. No pmhx and no home meds from the records     ROS: ROS could not be obtained bc pt is intubated    Past Medical History:   Diagnosis Date    History of testicular cancer 1981     Past Surgical History:   Procedure Laterality Date    TESTICLE REMOVAL Left     with LN dissection (Seperarate surgery )      Social History     Tobacco History     Smoking Status  Former Smoker Smoking Start Date  1/1/1978 Quit date  1/1/2008 Smoking Frequency  1.5 packs/day for 30 years (39 pk yrs)    Smoking Tobacco Type  Cigarettes    Smokeless Tobacco Use  Never Used          Alcohol History     Alcohol Use Status  Yes Drinks/Week  7 Cans of beer per week Amount  7.0 standard drinks of alcohol/wk          Drug Use     Drug Use Status  No          Sexual Activity     Sexually Active  Not Asked              Family History   Problem Relation Age of Onset    Heart Attack Mother     Cancer Father         bladder cancer    Cancer Sister         uterine cancer    Cancer Sister         breast cancer     No current facility-administered medications on file prior to encounter. No current outpatient medications on file prior to encounter.      Lab Results   Component Value Date    WBC 10.7 12/22/2021    HGB 15.3 12/22/2021    HCT 43.6 12/22/2021    MCV 86.9 12/22/2021     12/22/2021     Lab Results   Component Value Date     12/22/2021    K 5.0 12/22/2021    CL 96 12/22/2021    CO2 13 12/22/2021    BUN 29 12/22/2021    CREATININE 1.9 12/22/2021    CREATININE 2.09 12/22/2021    GLUCOSE 223 12/22/2021    CALCIUM 8.4 12/22/2021      HEENT: AT/NC, pupppil equal and slugish to light no JVD  HEART: s1/s2 wnl w/o s3, no m.r.g  LUNG: decrease BS, scattered rhonchi,  ABD: soft, distended, + SCAR, +BS  EXT: trace LE edema  SKin : no rash  Neuro:sedated        1) Acute respriatory failure due to covid PNA  2) HTN emergency/urgerncy  3) SUREKHA  3) hyperglycemia  4) Hypercouagulable state  5) obesity  Medications ACE, CT of the brain to rule out bleed. If negative for bleed start Lovenox 40 mg subcu twice daily. Repeat labs tomorrow.   Hemoglobin A1c, SSI glucose coverage, critical care evaluation, renal u/s for SUREKHA, cardene drip to bring down BP slowly, monitor oxygenation, keep FS b/t 140 and 180  CCT 60 min

## 2021-12-22 NOTE — ED NOTES
Pulse ox changed to other locations. Good pleth is now showing around 65% on NRB. Dr. Harry Bernardo notified. Zeeshan Murillo RN  12/22/21 [id number]

## 2021-12-22 NOTE — ED NOTES
Jasmine PINK at bedside after patient quick assessment by this RN. 80% on room air. Patient quickly titrated to 6L NC. Saturations improved to 96%. Work of breathing continues to be labored. Herlinda Allison RN  12/22/21 [id number]

## 2021-12-22 NOTE — ED NOTES
Jasmine PINK currently manually bagging patient with PEEP valve     Mary Reach.  Cherise Lai RN  12/22/21 3330

## 2021-12-22 NOTE — ED PROVIDER NOTES
[address] [facility] ED  eMERGENCY dEPARTMENTeNCOUnter      Pt Name: Art Terrazas  MRN: [id number]  [place] 1960  Date ofevaluation: 12/22/2021  Provider: Chung Dominguez PA-C    CHIEF COMPLAINT       Chief Complaint   Patient presents with    Shortness of Breath         HISTORY OF PRESENT ILLNESS   (Location/Symptom, Timing/Onset,Context/Setting, Quality, Duration, Modifying Factors, Severity)  Note limiting factors. Art Terrazas is a 64 y.o. male who presents to the emergency department cough and shortness of breath. Patient symptoms started 1 week ago. Symptoms have gotten worse the last 3 days. He does not regularly follow with a doctor. HPI    NursingNotes were reviewed. REVIEW OF SYSTEMS    (2-9 systems for level 4, 10 or more for level 5)     Review of Systems   Constitutional: Positive for activity change, appetite change, chills, fatigue and fever. Negative for diaphoresis. HENT: Negative for congestion, rhinorrhea and sore throat. Eyes: Negative for photophobia and pain. Respiratory: Positive for cough and shortness of breath. Cardiovascular: Negative for chest pain and palpitations. Gastrointestinal: Negative for abdominal pain, diarrhea, nausea and vomiting. Genitourinary: Negative for dysuria and flank pain. Musculoskeletal: Negative for back pain. Skin: Negative for rash. Neurological: Positive for weakness. Negative for dizziness, light-headedness and headaches. Psychiatric/Behavioral: Negative. All other systems reviewed and are negative. Except as noted above the remainder of the review of systems was reviewed and negative.        PAST MEDICAL HISTORY     Past Medical History:   Diagnosis Date    History of testicular cancer 0         SURGICALHISTORY       Past Surgical History:   Procedure Laterality Date    TESTICLE REMOVAL Left     with LN dissection (Seperarate surgery )          CURRENT MEDICATIONS       Previous Medications    No medications on file       ALLERGIES     Pcn [penicillins]    FAMILY HISTORY       Family History   Problem Relation Age of Onset    Heart Attack Mother     Cancer Father         bladder cancer    Cancer Sister         uterine cancer    Cancer Sister         breast cancer          SOCIAL HISTORY       Social History     Socioeconomic History    Marital status:      Spouse name: None    Number of children: None    Years of education: None    Highest education level: None   Occupational History    None   Tobacco Use    Smoking status: Former Smoker     Packs/day: 1.50     Years: 30.00     Pack years: 45.00     Types: Cigarettes     Start date:      Quit date:      Years since quittin.9    Smokeless tobacco: Never Used   Vaping Use    Vaping Use: Never used   Substance and Sexual Activity    Alcohol use: Yes     Alcohol/week: 7.0 standard drinks     Types: 7 Cans of beer per week    Drug use: No    Sexual activity: None   Other Topics Concern    None   Social History Narrative    None     Social Determinants of Health     Financial Resource Strain:     Difficulty of Paying Living Expenses: Not on file   Food Insecurity:     Worried About Running Out of Food in the Last Year: Not on file    Kannan of Food in the Last Year: Not on file   Transportation Needs:     Lack of Transportation (Medical): Not on file    Lack of Transportation (Non-Medical):  Not on file   Physical Activity:     Days of Exercise per Week: Not on file    Minutes of Exercise per Session: Not on file   Stress:     Feeling of Stress : Not on file   Social Connections:     Frequency of Communication with Friends and Family: Not on file    Frequency of Social Gatherings with Friends and Family: Not on file    Attends Rastafari Services: Not on file    Active Member of Clubs or Organizations: Not on file    Attends Club or Organization Meetings: Not on file    Marital Status: Not on file   Intimate Partner Violence:  Fear of Current or Ex-Partner: Not on file    Emotionally Abused: Not on file    Physically Abused: Not on file    Sexually Abused: Not on file   Housing Stability:     Unable to Pay for Housing in the Last Year: Not on file    Number of Osorio in the Last Year: Not on file    Unstable Housing in the Last Year: Not on file       SCREENINGS      @FLOW([id number])@      PHYSICAL EXAM    (up to 7 for level 4, 8 or more for level 5)     ED Triage Vitals [12/22/21 1623]   BP Temp Temp src Pulse Resp SpO2 Height Weight   (!) 213/96 -- -- 115 (!) 33 94 % 5' 8\" (1.727 m) 254 lb (115.2 kg)       Physical Exam  Vitals and nursing note reviewed. Constitutional:       General: He is not in acute distress. Appearance: Normal appearance. He is well-developed. He is ill-appearing and toxic-appearing. He is not diaphoretic. HENT:      Head: Normocephalic and atraumatic. Eyes:      General: Lids are normal.      Conjunctiva/sclera: Conjunctivae normal.   Cardiovascular:      Rate and Rhythm: Normal rate and regular rhythm. Pulses: Normal pulses. Heart sounds: Normal heart sounds. Pulmonary:      Effort: Tachypnea, accessory muscle usage and respiratory distress present. Breath sounds: Decreased air movement present. Decreased breath sounds present. Abdominal:      General: Bowel sounds are normal.      Palpations: Abdomen is soft. Tenderness: There is no abdominal tenderness. Musculoskeletal:      Cervical back: Normal range of motion and neck supple. Lymphadenopathy:      Cervical: No cervical adenopathy. Skin:     General: Skin is warm and dry. Capillary Refill: Capillary refill takes less than 2 seconds. Findings: No rash. Neurological:      Mental Status: He is alert and oriented to person, place, and time. Psychiatric:         Thought Content:  Thought content normal.         Judgment: Judgment normal.         DIAGNOSTIC RESULTS     EKG: All EKG's are interpreted by the Emergency Department Physician who either signs or Co-signsthis chart in the absence of a cardiologist.    Sinus tach 113 bpm no acute st changes no ectopy    RADIOLOGY:   Non-plain filmimages such as CT, Ultrasound and MRI are read by the radiologist. Plain radiographic images are visualized and preliminarily interpreted by the emergency physician with the below findings:        Interpretation per the Radiologist below, if available at the time ofthis note:    XR CHEST PORTABLE   Final Result   There is severe increased pulmonary markings throughout both lungs worrisome for fluid overload versus viral infiltrates, pneumonia.             XR CHEST PORTABLE    (Results Pending)         ED BEDSIDE ULTRASOUND:   Performed by ED Physician - none    LABS:  Labs Reviewed   COMPREHENSIVE METABOLIC PANEL - Abnormal; Notable for the following components:       Result Value    Potassium 5.0 (*)     CO2 13 (*)     Anion Gap 27 (*)     Glucose 223 (*)     BUN 29 (*)     CREATININE 2.09 (*)     GFR Non- 32.4 (*)     GFR  39.2 (*)     Calcium 8.4 (*)     Albumin 3.1 (*)      (*)     Globulin 4.3 (*)     All other components within normal limits   CBC WITH AUTO DIFFERENTIAL - Abnormal; Notable for the following components:    MCHC 32.2 (*)     RDW 15.0 (*)     Neutrophils Absolute 8.4 (*)     All other components within normal limits   LACTIC ACID, PLASMA - Abnormal; Notable for the following components:    Lactic Acid 10.9 (*)     All other components within normal limits    Narrative:     Cheryle Norway tel. [phone],  ANAID results called to and read back by Pato Amos RN, 12/22/2021  17:07, by Rishi Bunch   PROCALCITONIN - Abnormal; Notable for the following components:    Procalcitonin 0.51 (*)     All other components within normal limits   POCT ARTERIAL - Abnormal; Notable for the following components:    POC Glucose 234 (*)     POC Creatinine 1.9 (*)     GFR Non- 36 (*)     GFR  44 (*)     Calcium, Ion 1.07 (*)     pH, Arterial 7.289 (*)     pO2, Arterial 39 (*)     HCO3, Arterial 17.3 (*)     Base Excess, Arterial -9 (*)     O2 Sat, Arterial 68 (*)     TCO2, Arterial 18 (*)     Lactate 9.00 (*)     All other components within normal limits   CULTURE, BLOOD 1   CULTURE, BLOOD 2   COVID-19, RAPID   TROPONIN       All other labs were within normal range or not returned as of this dictation. EMERGENCY DEPARTMENT COURSE and DIFFERENTIAL DIAGNOSIS/MDM:   Vitals:    Vitals:    12/22/21 1736 12/22/21 1738 12/22/21 1741 12/22/21 1742   BP:   (!) 228/136    Pulse: 135 143 141 139   Resp: 29  28    SpO2: (!) 69% (!) 70% (!) 84% 97%   Weight:       Height:               MDM    Patient presents in respiratory distress. He was provided with IV Decadron fluids nonrebreather mask. Blood gases remarkable for hypoxia patient is having respiratory failure due to Covid pneumonia on chest x-ray. Patient's oxygen saturations and blood gas and clinical picture recommending intubation this was discussed with the patient as well as his wife discussed risks and they both consented to have full treatment done and patient is full code at this time. Patient was intubated with 1 attempt successful patient was hooked up to the ventilator and patient initial vent settings are 400 PEEP of 2100% and 24. Patient's oxygen saturations on the vent are 90 to 93%. Tolerating well. Patient be admitted to the ICU in critical condition. REASSESSMENT          CRITICAL CARE TIME   Total Critical Care time was 45  minutes, excluding separatelyreportable procedures. There was a high probability ofclinically significant/life threatening deterioration in the patient's condition which required my urgent intervention. CONSULTS:  None    PROCEDURES:  Unless otherwise noted below, none     Intubation    Date/Time: 12/22/2021 5:53 PM  Performed by:  Donah Rubinstein, DESTINEY  Authorized by: Sudha Miller PA-C     Consent:     Consent obtained:  Verbal    Consent given by:  Patient and spouse    Risks discussed:  Aspiration, bleeding, brain injury, death, dental trauma, hypoxia, laryngeal injury and pneumothorax    Alternatives discussed:  Delayed treatment, no treatment, alternative treatment, observation and referral  Pre-procedure details:     Patient status:  Awake    Mallampati score:  II    Pretreatment meds: etomidate. Paralytics:  Rocuronium  Procedure details:     Preoxygenation:  Bag valve mask    CPR in progress: no      Intubation method:  Oral    Oral intubation technique:  Video-assisted    Tube size (mm):  7.5    Tube type:  Cuffed    Number of attempts:  1    Ventilation between attempts: no      Cricoid pressure: no      Tube visualized through cords: yes    Placement assessment:     ETT to lip:  23    Tube secured with:  ETT varma    Breath sounds:  Equal    Placement verification: chest rise, condensation, CXR verification, direct visualization, equal breath sounds and ETCO2 detector      CXR findings:  ETT in proper place  Post-procedure details:     Patient tolerance of procedure: Tolerated well, no immediate complications        FINAL IMPRESSION      1. Acute hypoxemic respiratory failure due to COVID-19 [facility])          DISPOSITION/PLAN   DISPOSITION Decision To Admit 12/22/2021 05:48:57 PM      PATIENT REFERREDTO:  No follow-up provider specified.     DISCHARGEMEDICATIONS:  New Prescriptions    No medications on file          (Please note that portions of this note were completed with a voice recognition program.  Efforts were made to edit the dictations but occasionally words are mis-transcribed.)    Sudha Miller PA-C (electronically signed)  Attending Emergency Physician         Sudha Miller PA-C  12/22/21 [id number]

## 2021-12-23 NOTE — PROGRESS NOTES
PHARMACY NOTE:   ICU Rounds Attended (10-15 minutes in patient room):    Pt diagnosis: COVID/Acute respiratory failure    IV Fluids: NS @ 100    Renal:   Recent Labs     12/23/21  0021 12/23/21  0100 12/23/21  0559   CREATININE 2.8* 2.96* 3.14*    Estimated Creatinine Clearance: 31 mL/min (A) (based on SCr of 3.14 mg/dL (H)). Antimicrobial Therapy:   Day 2   Antimicrobial agents: remdesivir  Cultures pending   ID on consult: yes    Recent Labs     12/22/21  1630 12/23/21  0100 12/23/21  0600   WBC 10.7 12.0* 10.5         Pressors:   norepinephrine @ 2 mcg/min per rounds    Sedation:   Precedex @ 1.2mcg/kg/hr, per rounds  Propofol @ 20 mcg/kg/min, per rounds     Insulin Therapy (goal: 140-180): low SSI  2 units given past 24 hours   Lantus none   Recent Labs     12/22/21  1630 12/23/21  0100 12/23/21  0559   GLUCOSE 223* 203* 177*       Steroid Therapy: Day 2 of 10. Decadron 6mg  Stress Ulcer Prophylaxis:   Pantoprazole        DVT Prophylaxis/Anticoagulant Therapy: Head CT pending. Follow up on addition of dvt prophylaxis. Recent Labs     12/22/21  1630 12/23/21  0100 12/23/21  0600    255 261     No results for input(s): INR in the last 72 hours. Bowel Regimen:   Senna S BID  Miralax daily       Follow up/Changes:   -Lokemla x 3 doses added per verbal  - Follow up DVT prophylaxis post head CT  -Recommend hold of remdesivir d/t worsening renal function and significant increase in LFTs.   -Discussed use of actemra with dr. Sabi Ortega ( no relative contrainidication with increased LFTs just a caution of use, possiblity of using reduced dosing of 4mg/kg as well. CRP being ordered follow up on level and actemra order).       Jack Pederson, [facility], PharmD   12/23/2021 11:07 AM

## 2021-12-23 NOTE — CONSULTS
[facility]  [facility]ult [facility]           Reason for Consult: SUREKHA  Requesting Physician:  Dr. Josiane Rodriguez    Chief Complaint:  SOB  History Obtained From:  electronic medical record    History of Present Ilness:    64 y.o. male with history s/f testicular cancer who presented w/ SOB. Had been diagnosed w/ COVID1-9 ~1 week prior to presentation. On arrival noted to be hypoxic so was intubated. Was noted to be significantly hypertensive and tachycardic so given labetalol. Also got hypotensive following intubation and sedation w/ patient now on pressors. Nephrology consulted for SUREKHA. Not recent labs in our system however had nml renal function a few years ago. Scr 2 on presentation, continues to trend up, now in the 3s. Lactate 10 now down to 3s. Past Medical History:        Diagnosis Date    History of testicular cancer        Past Surgical History:        Procedure Laterality Date    TESTICLE REMOVAL Left     with LN dissection (Seperarate surgery )        Home Medications:    No current facility-administered medications on file prior to encounter. No current outpatient medications on file prior to encounter. Allergies:  Pcn [penicillins]    Social History:    Social History     Socioeconomic History    Marital status:      Spouse name: Not on file    Number of children: Not on file    Years of education: Not on file    Highest education level: Not on file   Occupational History    Not on file   Tobacco Use    Smoking status: Former Smoker     Packs/day: 1.50     Years: 30.00     Pack years: 45.00     Types: Cigarettes     Start date:      Quit date:      Years since quittin.9    Smokeless tobacco: Never Used   Vaping Use    Vaping Use: Never used   Substance and Sexual Activity    Alcohol use:  Yes     Alcohol/week: 7.0 standard drinks     Types: 7 Cans of beer per week    Drug use: No    Sexual activity: Not on file   Other Topics Concern    Not on file Social History Narrative    Not on file     Social Determinants of Health     Financial Resource Strain:     Difficulty of Paying Living Expenses: Not on file   Food Insecurity:     Worried About Running Out of Food in the Last Year: Not on file    Kannan of Food in the Last Year: Not on file   Transportation Needs:     Lack of Transportation (Medical): Not on file    Lack of Transportation (Non-Medical):  Not on file   Physical Activity:     Days of Exercise per Week: Not on file    Minutes of Exercise per Session: Not on file   Stress:     Feeling of Stress : Not on file   Social Connections:     Frequency of Communication with Friends and Family: Not on file    Frequency of Social Gatherings with Friends and Family: Not on file    Attends Mandaen Services: Not on file    Active Member of [address] [place] [address] [employer] or Organizations: Not on file    Attends Club or Organization Meetings: Not on file    Marital Status: Not on file   Intimate Partner Violence:     Fear of Current or Ex-Partner: Not on file    Emotionally Abused: Not on file    Physically Abused: Not on file    Sexually Abused: Not on file   Housing Stability:     Unable to Pay for Housing in the Last Year: Not on file    Number of [place] in the Last Year: Not on file    Unstable Housing in the Last Year: Not on file       Family History:   Family History   Problem Relation Age of Onset    Heart Attack Mother     Cancer Father         bladder cancer    Cancer Sister         uterine cancer    Cancer Sister         breast cancer       Review of Systems:   Unable to obtain due to intubation and sedation    Physical exam:   Constitutional:  VITALS:  /71   Pulse 64   Temp 98.3 °F (36.8 °C) (Oral)   Resp 28   Ht 5' 8\" (1.727 m)   Wt 262 lb 12.6 oz (119.2 kg)   SpO2 100%   BMI 39.96 kg/m²     General: intubated, sedated  HEENT: normocephalic, atraumatic, ETT in place  Neck: supple, no mass  Lungs: intubated, on vent, coarse breath sounds  Heart: regular rate and rhythm, no murmurs or rubs  Abdomen: soft, non-tender, non-distended  MSK: no joint swelling or tenderness  Ext: no cyanosis, no peripheral edema  Neuro: unable to assess, sedated  Psych: unable to assess due to mental status  Skin: no rash      Data/  CBC:   Lab Results   Component Value Date    WBC 10.5 12/23/2021    RBC 4.54 12/23/2021    HGB 12.8 12/23/2021    HCT 38.6 12/23/2021    MCV 85.0 12/23/2021    MCH 28.1 12/23/2021    MCHC 33.0 12/23/2021    RDW 15.2 12/23/2021     12/23/2021     BMP:    Lab Results   Component Value Date     12/23/2021    K 5.3 12/23/2021     12/23/2021    CO2 19 12/23/2021    BUN 46 12/23/2021    LABALBU 2.8 12/23/2021    CREATININE 3.14 12/23/2021    CALCIUM 7.8 12/23/2021    GFRAA 24.5 12/23/2021    LABGLOM 20.3 12/23/2021    GLUCOSE 177 12/23/2021     XR CHEST PORTABLE    Result Date: 12/23/2021  XR CHEST PORTABLE : 12/23/2021 CLINICAL HISTORY:  line placement . COMPARISON: 12/22/2021. TECHNIQUE: A portable upright AP radiograph of the chest was obtained. FINDINGS: There is been interval placement of a left internal jugular approach central venous catheter with its tip overlying the SVC. There is no pneumothorax, sizable pleural effusion, significant change in the cardiac and mediastinal silhouettes, or other significant changes from yesterday identified. Endotracheal and orogastric tubes and mild to moderate pulmonary infiltrates appear unchanged, given differences in technique and positioning. LEFT INTERNAL JUGULAR APPROACH CENTRAL VENOUS CATHETER PLACED IN EXPECTED POSITION. OTHERWISE, STABLE CHEST FROM YESTERDAY. XR CHEST PORTABLE    Result Date: 12/22/2021  Exam: XR CHEST PORTABLE History: Intubation. Acute respiratory failure with hypoxia. Technique: AP portable view of the chest obtained.  Comparison: Portable chest radiograph from earlier on the same date Findings: Interval placement of an endotracheal tube that terminates at the level of the clavicles. Enteric tube extends inferiorly outside of the field of view. The cardiomediastinal silhouette is within normal limits. Diffuse airspace opacities throughout both lungs. No pneumothorax or pleural effusion. No acute osseous abnormality. Diffuse bilateral airspace opacities may represent pulmonary edema or pneumonia. XR CHEST PORTABLE    Result Date: 12/22/2021  Exam: XR CHEST PORTABLE History:  sob Technique: AP portable view of the chest obtained. Comparison: none Chest x-ray portable Findings: The cardiac silhouette is at the upper limits of normal in size. There are severe increased markings throughout both lungs. There are no pleural effusions. Bones of the thorax appear intact. There is severe increased pulmonary markings throughout both lungs worrisome for fluid overload versus viral infiltrates, pneumonia. XR CHEST ABDOMEN NG PLACEMENT    Result Date: 12/23/2021  XR CHEST ABDOMEN NG PLACEMENT : 12/22/2021 CLINICAL HISTORY:  NG tube placement . COMPARISON: Portable chest from earlier 12/22/2021. TECHNIQUE: A portable supine AP radiograph of the upper abdomen and chest was performed. FINDINGS: An orogastric tube is noted with its tip overlying the region of the lesser curvature of the mid body of the stomach. An endotracheal tube remains in expected position. Mild to moderate diffuse pulmonary infiltrates appear unchanged. No other significant changes are identified elsewhere. ENDOTRACHEAL AND OROGASTRIC TUBES IN EXPECTED POSITIONS. Assessment:    1. SUREKHA: baseline function unknown, 2/2 hemodynamic instability, baseline unknown, nml function years ago however now presented Scr in the 2s  2. Acute hypoxic respiratory failure: 2/2 covid-19 PNA, intubated  3. Shock: on pressors   4. Hyperkalemia  5. Metabolic acidosis: 2/2 lactic acidosis and renal failure  6. Hypoalbuminemia  7.  Transaminitis     Plan:  - will give lasix 80 mg IV as a diuretic challenge   - at risk of ending up on RRT if function continues to worsen   - agree w/ nani      Thank you for the consultation. Will continue to follow  Please do not hesitate to call with questions.     Sandy Corral MD, MD

## 2021-12-23 NOTE — PROGRESS NOTES
Physician Progress Note      PATIENT:               Estrella Bangura  CSN #:                  [id number]  :                       1960  ADMIT DATE:       2021 4:11 PM  [address] [place] DATE:  RESPONDING  PROVIDER #:        Rena Maxwell MD          QUERY TEXT:    Patient admitted with COVID 19 pneumonia, acute respiratory failure, SUREKHA . Per   Dr Dani Gómez  progress note \"septic shock\",  wbc 1.7  12.0;  lactic acid 10.9   temp 99   pulse 115-143, RR 28-33 , b/p 40/22 creatinine 2.09->3.14;  please   document in progress notes and discharge summary the present on admission   status of sepsis. The medical record reflects the following:  Risk Factors: + COVID 19 pneumonia acute respiratory failure SUREKHA septic shock  Clinical Indicators:  lactic acid 10.9  WBC 10.7 12.0  10.5;  covid19 detected     d dimer 3.07  8.51    procalcitonin 0.51; pulse 115-143, RR 28-33 ,   creatinine 2.09->3.14 cxr: There is severe increased pulmonary markings   throughout both lungs worrisome for fluid overload versus viral infiltrates,   pneumonia. per Dr Dani Gómez consult 21:  Dannie Lara  COVID 19 pneumonia    septic shock\"  Treatment: ICU admit  intubation  levophed   consult nephrology   per   intensivist not candidate for remdesivir due to abnormal LFT, same applies to   baricitinib and Actemra    Thank you,  Tomeka MTZN RN CDS   M-F 6am-2pm  Options provided:  -- Yes, sepsis was present at the time of the order to admit to the hospital  -- Other - I will add my own diagnosis  -- Disagree - Not applicable / Not valid  -- Disagree - Clinically unable to determine / Unknown  -- Refer to Clinical Documentation Reviewer    PROVIDER RESPONSE TEXT:    Provider is clinically unable to determine a response to this query.     Query created by: Juarez Vallejo on 2021 11:22 AM      Electronically signed by:  Rena Maxwell MD 2021 12:07 PM

## 2021-12-23 NOTE — ED NOTES
Patient O2 saturations 74% on vent, respiratory cartside, unable to take patient to CT at this time due to pt being unstable, pt extremely diaphoretic, Dr Nguyen [facility] notified of patient condition     Joanne Jacobo RN  12/22/21 2047

## 2021-12-23 NOTE — CONSULTS
Comprehensive Nutrition Assessment    Type and Reason for Visit:  Initial,Consult (TF Order and Mgmt)    Nutrition Recommendations/Plan:    Start Peptide Based High Protein TF with goal rate of 20 ml/hr x 24 hrs  Flush 100 ml Q 4    Nutrition Assessment:  Pt currently at nutritional risk d/t vent. Plan is to start proning. Will order trophic TF. Malnutrition Assessment:  Malnutrition Status: At risk for malnutrition (Comment)    Context:  Acute Illness     Findings of the 6 clinical characteristics of malnutrition:  Energy Intake:  Unable to assess  Weight Loss:  Unable to assess     Body Fat Loss:  Unable to assess     Muscle Mass Loss:  Unable to assess    Fluid Accumulation:  No significant fluid accumulation     Strength:  Not Performed    Estimated Daily Nutrient Needs:  Energy (kcal):  7851-1436 kcal (11-13 kcal/kg); Weight Used for Energy Requirements:  Current (119 kg)     Protein (g):  126-140 kcal (1.8-2 g/kg); Weight Used for Protein Requirements:  Ideal (70 kg IBW)        Fluid (ml/day):  ~1400 ml or per MD; Method Used for Fluid Requirements:  1 ml/kcal      Nutrition Related Findings:  No nutritionally-relevant PMHx. Pt intubated in ED following acute hypoxemic respiratory failure d/t COVID-19. HTN emergency and SUREKHA on admission. Requiring levophed, precedex, propofol. Glucose under good control currently. K elevated at 5.3. Renal labs elevated. Trace edema. No BM. Bowel regimen on board.       Wounds:  None       Current Nutrition Therapies:    Diet NPO  Current Tube Feeding (TF) Orders:  · Feeding Route: Orogastric  · Formula: Peptide Based High Protein  · Schedule: Continuous @ 20 ml/hr x 24 hrs  · Water Flushes: 100 ml Q 4  · Current TF & Flush Orders Provides: 480 kcal + propofol kcal, 42g protein, 1000 ml fluid  · Goal TF & Flush Orders Provides: 480 kcal + propofol kcal, 42g protein, 1000 ml fluid      Anthropometric Measures:  · Height: 5' 8\" (172.7 cm)  · Current Body Weight: 262 lb (118.8 kg)   · Admission Body Weight: 254 lb (115.2 kg) (estimated)    · Usual Body Weight:  (unknown)     · Ideal Body Weight: 154 lbs  · BMI: 39.8  · Adjusted Body Weight:  ; No Adjustment   · BMI Categories: Obese Class 2 (BMI 35.0 -39.9)       Nutrition Diagnosis:   · Inadequate oral intake related to impaired respiratory function (d/t COVID) as evidenced by NPO or clear liquid status due to medical condition    Nutrition Interventions:   Food and/or Nutrient Delivery:  Continue NPO,Start Tube Feeding  Nutrition Education/Counseling:  No recommendation at this time   Coordination of Nutrition Care:  Continue to monitor while inpatient    Goals: Tolerate EN at goal.       Nutrition Monitoring and Evaluation:   Behavioral-Environmental Outcomes:  None Identified   Food/Nutrient Intake Outcomes:  Enteral Nutrition Intake/Tolerance  Physical Signs/Symptoms Outcomes:  Biochemical Data,Meal Time Behavior,Weight,Fluid Status or Edema     Discharge Planning:     Too soon to determine     Electronically signed by Suad Torres RD, LD on 12/23/21 at 11:09 AM EST

## 2021-12-23 NOTE — PROGRESS NOTES
22:00-23:00 Pt arrived to ICU via ED bed accompanied by ED RN, RT and transport. Pt safely transferred into ICU bed 5 w/o incident, droplet plus precautions initiated per protocol. Proper monitoring equipment applied and functioning properly. Bedside report received from ED RN. Per ED RN pt had CT completed prior to transferring to ICU. Assessments completed (see flowsheets). Pt tolerating IV drip infusions/titrations well. OG measured at 55cm and connected to lower intermittent suction. OG placement verified via external length, respiratory status and pH. Skin assessment completed by this nurse and secondary RN (Marcia Covington), no skin issues noted bilateral heels intact. Preventive mepilex applied to bilateral heels and sacrum. Wife Samantha Wallace) called and spoke with this nurse, update provided.  notified of pts arrival and unit and updated on pts current condition, SNO. Safety measures in place. 01:30 Right radial A-line and LIJ CVC placed by , pt tolerated procedures well. CXR completed for verification of line placement.  verified line placement. 03:00 Reassessment completed (see flowsheets). Pt continues to tolerate IV drip infusions/titrations well. OG remains at 55cm and connected to lower intermittent suction. Safety measures in place. 05:30 AM labs drawn labeled and sent to lab.     07:00 Handoff report given to on coming RN.

## 2021-12-23 NOTE — CARE COORDINATION
[facility] [place] Case Management Initial Discharge Assessment    Met with Patient'S SPOUSE to discuss discharge plan. PCP: Yazmin Yañez MD                                Date of Last Visit: A LONG TIME. PER CHART 3YRS AGO    If no PCP, list provided? N/A- OK WITH STILL USING DR Waller Serum IF ABLE    Discharge Planning    Living Arrangements: independently at home    Who do you live with? SPOUSE ( NO CHILDREN)    Who helps you with your care:  self    If lives at home:     Do you have any barriers navigating in your home? no    Patient can perform ADL? Yes    Current Services (outpatient and in home) :  None    Dialysis: No    Is transportation available to get to your appointments? Yes    DME Equipment:  no    Respiratory equipment: None    Respiratory provider:  no     Pharmacy:  yes - [facility]    Consult with Medication Assistance Program?  No      Patient agreeable to [facility]? Yes, Company IF NEEDED    Patient agreeable to SNF/Rehab? Yes, Company IF NEEDED    Other discharge needs identified? Other POSSIBLE HOME 02    Initial Discharge Plan? (Note: please see concurrent daily documentation for any updates after initial note). SPOKE WITH SPOUSE. DICUSSED VARIETY OF OUTCOMES AND DC PLANS. WILL NEED TO TAKE DAY BY DAY. ONCE EXTUBATED MAY NEED PT/OT EVALS AND AT MINIMUM HOME 02 EVAL AND POSSIBLE HHC. DISCUSSED POSSIBILITY OF SNF/REHAB. WIFE TRANSFERRED TO MAIN DESK FOR UPDATE FROM RN.      Readmission Risk              Risk of Unplanned Readmission:  17         Electronically signed by Yvette Lira RN on 12/23/2021 at 2:43 PM

## 2021-12-23 NOTE — ACP (ADVANCE CARE PLANNING)
Advance Care Planning     Advance Care Planning Activator (Inpatient)  Conversation Note      Date of ACP Conversation: 12/23/2021     [employer] with:  Healthcare Decision Maker: Next of Kin by law (only applies in absence of above) (name) ZHANG DANIELSON- SPOUSE  NO CHILDREN NOTED PER SPOUSE    ACP Activator: Lula Hurd RN        Health Care Decision Maker:     Current Designated Health Care Decision Maker:     Primary Decision Maker: Lala Guardado - Spouse - [phone]  Click here to complete Healthcare Decision Makers including section of the Healthcare Decision Maker Relationship (ie \"Primary\")  Today we documented Decision Maker(s) consistent with Legal Next of Kin hierarchy. Care Preferences    Ventilation: \"If you were in your present state of health and suddenly became very ill and were unable to breathe on your own, what would your preference be about the use of a ventilator (breathing machine) if it were available to you? \"      Would the patient desire the use of ventilator (breathing machine)?: yes PER SPOUSE    \"If your health worsens and it becomes clear that your chance of recovery is unlikely, what would your preference be about the use of a ventilator (breathing machine) if it were available to you? \"     Would the patient desire the use of ventilator (breathing machine)?: No- PER SPOUSE PATIENT WOULD NOT WANT CONTINUED USE IF RECOVERY WAS UNLIKELY AND WOULD DISCUSS AS CASE BY CASE SCENARIO       Resuscitation  \"CPR works best to restart the heart when there is a sudden event, like a heart attack, in someone who is otherwise healthy. Unfortunately, CPR does not typically restart the heart for people who have serious health conditions or who are very sick. \"    \"In the event your heart stopped as a result of an underlying serious health condition, would you want attempts to be made to restart your heart (answer \"yes\" for attempt to resuscitate) or would you prefer a natural death (answer \"no\" for do not attempt to resuscitate)? \" yes       [] Yes   [] No   Educated Patient / Ciara Palin regarding differences between Advance Directives and portable DNR orders.     Length of ACP Conversation in minutes:      Conversation Outcomes:  [x] ACP discussion completed  [] Existing advance directive reviewed with patient; no changes to patient's previously recorded wishes  [] New Advance Directive completed  [] Portable Do Not Rescitate prepared for Provider review and signature  [] POLST/POST/MOLST/MOST prepared for Provider review and signature      Follow-up plan:    [] Schedule follow-up conversation to continue planning  [] Referred individual to Provider for additional questions/concerns   [] Advised patient/agent/surrogate to review completed ACP document and update if needed with changes in condition, patient preferences or care setting    [x] This note routed to one or more involved healthcare providers

## 2021-12-23 NOTE — CONSULTS
Pulmonary and Critical Care Medicine  Consult Note  Encounter Date: 2021 8:06 AM    Mr. Randi Adam is a 64 y.o. male  : 1960  Requesting Provider: Minnie Aburto MD    Reason for request: ICU management            HISTORY OF PRESENT ILLNESS:    Patient is 64 y.o. presents with acute respiratory failure, currently patient intubated and sedated, per notes patient was admitted with worsening shortness of breath, symptoms started 1 week ago, worse over the last 3 days prior to admission, he was diagnosed with Covid 1 week ago at urgent care clinic, he was hypoxic on arrival to ED saturation 80% on room air and later got intubated, he was hypertensive initially blood pressure up to 260/140, however post sedation and intubation he became hypotensive, requiring Levophed starting at 30 mcg/min currently at 4 mcg/min. He is on propofol and Precedex,          Past Medical History:        Diagnosis Date    History of testicular cancer        Past Surgical History:        Procedure Laterality Date    TESTICLE REMOVAL Left     with LN dissection (Seperarate surgery )        Social History:     reports that he quit smoking about 13 years ago. His smoking use included cigarettes. He started smoking about 44 years ago. He has a 45.00 pack-year smoking history. He has never used smokeless tobacco. He reports current alcohol use of about 7.0 standard drinks of alcohol per week. He reports that he does not use drugs.     Family History:       Problem Relation Age of Onset    Heart Attack Mother     Cancer Father         bladder cancer    Cancer Sister         uterine cancer    Cancer Sister         breast cancer       Allergies:  Pcn [penicillins]        MEDICATIONS during current hospitalization:    Continuous Infusions:   norepinephrine 4 mcg/min (21 0746)    propofol 20 mcg/kg/min (21 3631)    sodium chloride 100 mL/hr at 21 0303    dexmedetomidine (PRECEDEX) IV infusion 1.2 mcg/kg/hr (12/23/21 0533)       Scheduled Meds:   pantoprazole  40 mg IntraVENous Daily    dexamethasone  6 mg IntraVENous Q24H    insulin lispro  0-6 Units SubCUTAneous TID WC    insulin lispro  0-3 Units SubCUTAneous Nightly    remdesivir IVPB  100 mg IntraVENous Q24H       PRN Meds:hydrALAZINE, sodium chloride, midazolam, iopamidol        REVIEW OF SYSTEMS: Not obtainable, patient sedated and on vent    PHYSICAL EXAM:    Vitals:  /71   Pulse 65   Temp 98.3 °F (36.8 °C) (Oral)   Resp 18   Ht 5' 8\" (1.727 m)   Wt 262 lb 12.6 oz (119.2 kg)   SpO2 96%   BMI 39.96 kg/m²     General: Sedated, on vent  Head: normocephalic, atraumatic  Eyes:No gross abnormalities. ENT:  MMM no lesions, ET and OG tube in  Neck:  supple and no masses  Chest : Good air movement, bilateral rales, no wheezing, nontender, tympanic  Heart[de-identified] Heart sounds are normal.  Regular rate and rhythm without murmur, gallop or rub. ABD:  symmetric, soft, non-tender, no guarding or rebound  Musculoskeletal : no cyanosis, no clubbing and no edema  Neuro:  Sedated, unresponsive  Skin: No rashes or nodules noted. Lymph node:  no cervical nodes  Urology: Yes Mc[place]   Psychiatric: Calm         Data Review  Recent Labs     12/22/21  1630 12/22/21  1657 12/23/21  0021 12/23/21  0100 12/23/21  0600   WBC 10.7  --   --  12.0* 10.5   HGB 14.1   < > 12.4* 12.6* 12.8*   HCT 43.6  --   --  38.6* 38.6*     --   --  255 261    < > = values in this interval not displayed. Recent Labs     12/22/21  1630 12/22/21  1657 12/23/21  0021 12/23/21  0100 12/23/21  0559     --   --  136 136   K 5.0*  --   --  5.0* 5.3*   CL 96  --   --  101 102   CO2 13*  --   --  18* 19*   BUN 29*  --   --  44* 46*   CREATININE 2.09*   < > 2.8* 2.96* 3.14*   GLUCOSE 223*  --   --  203* 177*    < > = values in this interval not displayed.        MV Settings:     Vent Mode: AC/VC  Vt Ordered: 320 mL  Rate Set: 28 bmp  PEEP/CPAP: 20  Peak Inspiratory Pressure: 32 cmH2O  Plateau Pressure: 36 cmH20  Mean Airway Pressure: 26 cmH20  I:E Ratio: 1:2.6    ABGs:   Recent Labs     12/22/21  1657 12/23/21  0011 12/23/21  0021   PHART 7.289* 7.261* 7.278*   WNV6PYC 36 49* 43   PO2ART 39* 41* 99*   KQR1SDN 17.3* 22.0 20.3*   BEART -9* -5* -7*   S1ZXATPO 68* 68* 97*   WYO8ISS 18* 24 22     O2 Device: Ventilator  O2 Flow Rate (L/min): 15 L/min  Lab Results   Component Value Date    LACTA 10.9 12/22/2021       Radiology  I personally reviewed imaging studies and bilateral groundglass infiltrate        Assessment, plan: This is a critically ill patient at risk of deterioration / death , needing close ICU monitoring and intervention due to below noted problems     · Acute hypoxic respiratory failure  · Severe COVID-19 pneumonia  · Septic shock  · Acute kidney injury  · Abnormal LFT secondary to COVID-19 infection and worsening     Recommendation  · Vent support lung protective strategy  · head of the bed 30°  · Daily sedation holiday  · Breathing trials when lung mechanics improves  · Sedation with combination propofol and fentanyl target R ASS of 0 to -1, if needed use Precedex  · Watch for ICU delirium: TV on, natural light, avoid benzos, pain control, early mobility, and family engagement  · PUD prophylaxis  · DVT prophylaxis  · Levophed to maintain mean arterial pressure 65-70  · Start trophic tube feed   · Prone position 18 hours and 6 hours supine   · Lokelma x3 doses   · Consult nephrology   · Monitor LFTs   · Not candidate for remdesivir due to abnormal LFT, same applies to baricitinib and Actemra  · Repeat procalcitonin tomorrow  · CT head today, in light of accelerated hypertension initially, if negative will proceed with DVT prophylaxis. Due to the immediate potential for life-threatening deterioration due to acute respiratory failure and septic shock, I spent 35 minutes providing critical care. This time is excluding time spent performing procedures.       Thank you for consultation    Addendum    Discussed with pharmacy, LFTs not absolute contraindication for Actemra, will need CRP and dose will be adjusted for Actemra. Will order CRP and follow-up with pharmacy.       Electronically signed by Mikey Velze MD, [facility],  on 12/23/2021 at 8:06 AM

## 2021-12-23 NOTE — ED NOTES
Call to respiratory for patient transport to CT. They will return to ER at 1945 to [address].  Jessica Murillo, KAI  12/22/21 3286

## 2021-12-23 NOTE — PROGRESS NOTES
Progress Note  Date:2021       Room:[place] [address]  Patient Natalie Sadler     YOB: 1960     Age:61 y.o.    ROS: could not be obtained bc pt is intubated    Subjective    Subjective   Review of Systems  Objective         Vitals Last 24 Hours:  TEMPERATURE:  Temp  Av.2 °F (36.8 °C)  Min: 97.4 °F (36.3 °C)  Max: 99 °F (37.2 °C)  RESPIRATIONS RANGE: Resp  Av.2  Min: 13  Max: 40  PULSE OXIMETRY RANGE: SpO2  Av.7 %  Min: 30 %  Max: 100 %  PULSE RANGE: Pulse  Av.6  Min: 57  Max: 143  BLOOD PRESSURE RANGE: Systolic (12XTG), VKF:071 , Min:40 , MSN:037   ; Diastolic (36RCZ), PHM:01, Min:22, Max:144    I/O (24Hr): Intake/Output Summary (Last 24 hours) at 2021 1134  Last data filed at 2021 0700  Gross per 24 hour   Intake 1943 ml   Output 950 ml   Net 993 ml     Objective:  General Appearance:  Ill-appearing. Vital signs: (most recent): Blood pressure 119/71, pulse 62, temperature 98.3 °F (36.8 °C), temperature source Oral, resp. rate 23, height 5' 8\" (1.727 m), weight 262 lb 12.6 oz (119.2 kg), SpO2 100 %. HEENT: (+ ET tube)    Lungs:  Breath sounds clear to auscultation. Heart: Normal rate. S1 normal and S2 normal.    Abdomen: Abdomen is soft and distended. Bowel sounds are normal.     Pulses: Distal pulses are intact. Skin:  Warm and dry. Labs/Imaging/Diagnostics    Labs:  CBC:  Recent Labs     21  1630 21  1657 21  0021 21  0100 21  0600   WBC 10.7  --   --  12.0* 10.5   RBC 5.02  --   --  4.55* 4.54*   HGB 14.1   < > 12.4* 12.6* 12.8*   HCT 43.6  --   --  38.6* 38.6*   MCV 86.9  --   --  84.8 85.0   RDW 15.0*  --   --  15.2* 15.2*     --   --  255 261    < > = values in this interval not displayed.      CHEMISTRIES:  Recent Labs     21  1630 21  1657 21  0021 21  0100 21  0559     --   --  136 136   K 5.0*  --   --  5.0* 5.3*   CL 96  --   --  101 102   CO2 13*  --   --  18* 19* BUN 29*  --   --  44* 46*   CREATININE 2.09*   < > 2.8* 2.96* 3.14*   GLUCOSE 223*  --   --  203* 177*    < > = values in this interval not displayed. PT/INR:No results for input(s): PROTIME, INR in the last 72 hours. APTT:No results for input(s): APTT in the last 72 hours. LIVER PROFILE:  Recent Labs     12/22/21  1630 12/23/21  0100 12/23/21  0559   * 366* 473*   ALT 34 166* 159*   BILITOT 0.3 0.4 0.3   ALKPHOS 70 81 86       Imaging Last 24 Hours:  CT HEAD WO CONTRAST    Result Date: 12/23/2021  CT Brain. Contrast medium:  without contrast.. History:  Hypertension. Rule out bleed. . Technical factors: CT imaging of the brain was obtained and formatted as 5 mm contiguous axial images. 2.5 mm contiguous axial images were obtained through the osseous structures. Sagittal and coronal reconstruction obtained during postprocessing. Comparison:  None. Findings: Extra-axial spaces:  Normal. Intracranial hemorrhage:  None. Ventricular system: [Negative. Basal Cisterns:  Without anomaly. Cerebral Parenchyma: Without anomaly. Midline Shift:  None. Cerebellum:  No anomaly idenitied. Paranasal sinuses and mastoid air cells:  Small air-fluid levels, bilateral maxillary sinuses. Air-fluid level, bilateral sphenoid sinuses. Partial opacification, bilateral ethmoid sinuses. Visualized Orbits:  Negative. Impression: Pansinusitis. All CT scans at this facility use dose modulation, iterative reconstruction, and/or weight based dosing when appropriate to reduce radiation dose to as low as reasonably achievable. XR CHEST PORTABLE    Result Date: 12/23/2021  XR CHEST PORTABLE : 12/23/2021 CLINICAL HISTORY:  line placement . COMPARISON: 12/22/2021. TECHNIQUE: A portable upright AP radiograph of the chest was obtained. FINDINGS: There is been interval placement of a left internal jugular approach central venous catheter with its tip overlying the SVC.  There is no pneumothorax, sizable pleural effusion, significant change in the cardiac and mediastinal silhouettes, or other significant changes from yesterday identified. Endotracheal and orogastric tubes and mild to moderate pulmonary infiltrates appear unchanged, given differences in technique and positioning. LEFT INTERNAL JUGULAR APPROACH CENTRAL VENOUS CATHETER PLACED IN EXPECTED POSITION. OTHERWISE, STABLE CHEST FROM YESTERDAY. XR CHEST PORTABLE    Result Date: 12/22/2021  Exam: XR CHEST PORTABLE History: Intubation. Acute respiratory failure with hypoxia. Technique: AP portable view of the chest obtained. Comparison: Portable chest radiograph from earlier on the same date Findings: Interval placement of an endotracheal tube that terminates at the level of the clavicles. Enteric tube extends inferiorly outside of the field of view. The cardiomediastinal silhouette is within normal limits. Diffuse airspace opacities throughout both lungs. No pneumothorax or pleural effusion. No acute osseous abnormality. Diffuse bilateral airspace opacities may represent pulmonary edema or pneumonia. XR CHEST PORTABLE    Result Date: 12/22/2021  Exam: XR CHEST PORTABLE History:  sob Technique: AP portable view of the chest obtained. Comparison: none Chest x-ray portable Findings: The cardiac silhouette is at the upper limits of normal in size. There are severe increased markings throughout both lungs. There are no pleural effusions. Bones of the thorax appear intact. There is severe increased pulmonary markings throughout both lungs worrisome for fluid overload versus viral infiltrates, pneumonia. XR CHEST ABDOMEN NG PLACEMENT    Result Date: 12/23/2021  XR CHEST ABDOMEN NG PLACEMENT : 12/22/2021 CLINICAL HISTORY:  NG tube placement . COMPARISON: Portable chest from earlier 12/22/2021. TECHNIQUE: A portable supine AP radiograph of the upper abdomen and chest was performed.  FINDINGS: An orogastric tube is noted with its tip overlying the region of the lesser curvature of the mid body of the stomach. An endotracheal tube remains in expected position. Mild to moderate diffuse pulmonary infiltrates appear unchanged. No other significant changes are identified elsewhere. ENDOTRACHEAL AND OROGASTRIC TUBES IN EXPECTED POSITIONS. US RETROPERITONEAL LIMITED    Result Date: 12/23/2021  US RETROPERITONEAL LIMITED : 12/22/2021 CLINICAL HISTORY:  SUREKHA . COMPARISON: None available. TECHNIQUE:  Transabdominal ultrasound of the kidneys was performed. FINDINGS: The study is mild to moderately limited by the patient's body habitus. Both kidneys appear normal in size, position and morphology without significantly increased echogenicity. Small hypoechoic nodules within the left mid to lower pole are probably peripelvic cysts rather than pelvocaliectasis. There is no significant hydronephrosis, abnormal perinephric collections, visualized nephrolithiasis, solid or other cystic renal masses. The right kidney measures approximately 10.7 x 5.1 x 5.1 cm. The left kidney measures approximately 11.5 x 6.0 x 5.4 cm. The average renal parenchymal thickness is approximately 1 cm. ESSENTIALLY NEGATIVE LIMITED RENAL ULTRASOUND. Assessment//Plan           Hospital Problems           Last Modified POA    Acute respiratory failure with hypoxia ([address] 12/22/2021 Yes      ) Acute respriatory failure due to covid PNA  2) HTN emergency/urgerncy  3) SUREKHA  3) hyperglycemia  4) Hypercouagulable state  5) obesity  Assessment & Plan  12/23: CT head negative for bleed. Start Lovenox 40 subcu twice daily. Continue remdesivir clinic follow-up renal.  Follow-up renal ultrasound result. Hepatitis profile.   Liver ultrasound if hep profile negative for elevated LFTS, abx as per ID  Electronically signed by Ursula Miles MD on 12/23/21 at 11:34 AM EST

## 2021-12-23 NOTE — PLAN OF CARE
Problem: Nutrition  Goal: Optimal nutrition therapy  Outcome: Ongoing   Nutrition Problem #1: Inadequate oral intake  Intervention: Food and/or Nutrient Delivery: Continue NPO,Start Tube Feeding  Nutritional Goals:  Tolerate EN at goal.

## 2021-12-23 NOTE — PROCEDURES
Call for worsening hypertension despite reduce sedation levels. Patient started on Levophed with increasing pressor dose as needed to maintain a goal mean arterial pressure of 65. Patient was bolused with 500 mL of normal saline with some transiently improved blood pressure blood pressure however again mean arterial pressure dropped into the 40s. CVC was clipped quickly placed for central venous access with arterial line placed for apparent blood pressure readings and frequent ABGs given that the systolic blood pressure had been inconsistent with a range of around 90 mmHg. .  Stat chest x-ray ordered. Bedside ultrasound was used for both procedures    Patient Name: Ray Chinchilla   Medical Record Number: [id number]  Date: 12/23/2021   Time: 12:57 AM   Room/Bed: [place] [address]  Central Line Placement Procedure Note  Indication: vascular access    Consent: Unable to be obtained due to the emergent nature of this procedure. Procedure: The patient was positioned appropriately and the skin over the left internal jugular vein was prepped with betadine and draped in a sterile fashion. Local anesthesia was not performed due to the emergent nature of this procedure. A large bore needle was used to identify the vein. A guide wire was then inserted into the vein through the needle. A triple lumen catheter was then inserted into the vessel over the guide wire using the Seldinger technique. All ports showed good, free flowing blood return and were flushed with saline solution. The catheter was then securely fastened to the skin with sutures and with an adhesive dressing and covered with a sterile dressing. A post procedure X-ray was ordered and is still pending at this time. The patient tolerated the procedure well.     Complications: None    Electronically Signed by: Nancie Fraser DO    Patient Name: Ray Chinchilla   Medical Record Number: [id number]  Date: 12/23/2021   Time: 12:58 AM   Room/Bed: [place] [address]  Arterial Line Placement Procedure Note                   Indication: Need for serial blood work    Consent: Unable to be obtained due to the emergent nature of this procedure. Oswaldo's Test: Normal    Procedure: The skin over the right radial artery was prepped with betadine and draped in a sterile fashion. Local anesthesia was not performed due to the emergent nature of this procedure. An 18 gauge arterial line catheter was then inserted, over a needle, using a modified Seldinger technique, into the vessel. The transducer set was then attached and securely fastened to the skin with sutures. Waveforms on the monitor were observed and found to be adequate. The patient had good distal perfusion after the procedure. The site was then dressed in a sterile fashion. The patient tolerated the procedure well.      Complications: None    Electronically Signed by: Sofie Reyes DO

## 2021-12-23 NOTE — PROGRESS NOTES
0800- patient on ventilator support keep O2 sat %, sedated with propofol and Precedex, on levo for BP support currently titrating down, maintenance fluids running. Pt. Sinus rhythm/sinus patricia on monitor. Afebrile at this time. Pt. nonresponsive to stimuli in extremities, pt. Does cough/gag. Pupils equal and sluggish. Mc[place] in place, SCDs in place and on. Bed in continuous lateral rotation, all 4 side rails up for this reason. Darnell. Soft wrist restraints also in place at this time. 200- wife called, had HIPAA code, was updated on pt. Condition and plan of care. 1015- pt. Taken for CT scan. 1030- pt. Biting on ETT, propofol increased. 1040- pt. Taken for PICC insertion. Telephone consent given by wife Viridiana Magana. Consent verified with [facility] RN. 1145- patient returned to room from procedures. 1200- pt. Sedation increased due to biting on ETT, vitals stable, levo continuing to be titrated down. Coffee ground noted in OG tube, notified Dr. Unique Sherman. 1305- levo stopped at this time. 1420- blood collected and sent to lab. Waiting for pharmacy to send heparin drip to start it. 1500- pt. Turned prone. O2 sat 99%, HR and BP stable at this time. Heparin drip started at 1520. Anti-xa level draw due at 2130.    1600- pt.'s wife called, updated her on pt. Condition again. 1700- pt. Vent alarm going off, pt. Suctioned, no sputum, vent continuing to alarm, pt. satting higher 80s, respiratory notified. Respiratory stated they put more air into ETT, pt. Now satting 95%. Also notified Dr. Unique Sherman of pt. Breathing over vent, rate 44-45. Fentanyl ordered. Pt.'s tube feed started. 1820- U. O. for shift only 150cc, notified Dr. Shahla Villegas. 2030- patient remains in prone position, on ventilator support, O2 sat in mid 90s at this time. Sinus patricia rate in 50s, BP stable, levo remains off at this time. Pt. Continues to breathe over vent and bite on ETT and bite during oral care, fentanyl started.  Pt.

## 2021-12-24 NOTE — PROGRESS NOTES
Nephrology Progress Note    1. SUREKHA: baseline function unknown, 2/2 hemodynamic instability, baseline unknown, nml function years ago however now presented Scr in the 2s, worsening  2. Acute hypoxic respiratory failure: 2/2 covid-19 PNA, intubated  3. Shock: off pressors   4. Hyperkalemia  5. Metabolic acidosis: 2/2 lactic acidosis and renal failure  6. Hypoalbuminemia  7. Transaminitis      Plan:  - will start pt on HD today following dialysis catheter placement   - stop IVF to reduce fluid overload       Patient Active Problem List:     Acute respiratory failure with hypoxia (HCC)      Subjective:  Admit Date: 12/22/2021    Interval History: renal function continues to worsen, poor response to lasix, currently off pressors, continues on NS, remains intubated as well    Medications:  Scheduled Meds:   pantoprazole  40 mg IntraVENous BID    cisatracurium Besylate  10 mg IntraVENous Once    lactulose  20 g Oral TID    tocilizumab (ACTEMRA-SUBQ) IVPB  810 mg IntraVENous Once    sodium zirconium cyclosilicate  10 g Oral Daily    heparin (porcine)  7,500 Units SubCUTAneous 3 times per day    chlorhexidine  15 mL Mouth/Throat BID    sennosides-docusate sodium  2 tablet Oral BID    polyethylene glycol  17 g Oral Daily    insulin lispro  0-6 Units SubCUTAneous 4 times per day    sodium chloride flush  5-40 mL IntraVENous 2 times per day    dexamethasone  6 mg IntraVENous Q24H     Continuous Infusions:   midazolam 2 mg/hr (12/24/21 0241)    cisatracurium (NIMBEX) infusion      norepinephrine Stopped (12/24/21 0804)    dextrose      sodium chloride      fentaNYL (SUBLIMAZE) infusion 110 mcg/hr (12/23/21 2340)    propofol 50 mcg/kg/min (12/24/21 0616)       CBC:   Recent Labs     12/23/21  1452 12/23/21  1455 12/24/21  0555 12/24/21  0800   WBC 7.3  --  12.8*  --    HGB 10.6*   < > 11.4* 10.9*     --  236  --     < > = values in this interval not displayed.      CMP:    Recent Labs     12/23/21  0100

## 2021-12-24 NOTE — PROGRESS NOTES
Daily Update    From:HOME WITH SPOUSE, INDEP, NO 02, NO CHILDREN     PMH: ETOH, TESTICULAR CA    Anticipated Discharge Date:TBD    Anticipated Discharge Disposition:TBD    Patient Mobility or PT/OT ordered:- NEEDED ONCE EXTUBATED  CONSULTS: ID, RENAL, PULM    Covid Test Date and Result: 12/22++, TESTED + PRIOR TO ER VISIT  K+ 5.0--5.3;  29/2.09--57/5.21; ^WBC 12.8 ^^LE    Barriers to Discharge: SR/VENT-100%, PICC LINE, OG-TF, LEVO/PROP/PREC DRIPS, IV DECA, IV REMDES, IVF, LOKELMA X3, PRONE, HEP DRIP, HD CATH AND WILL NEED HD PER RENAL.     Risk of Unplanned Readmission:  17   -CMI/ACP DONE

## 2021-12-24 NOTE — PROGRESS NOTES
Pulmonary & Critical Care Medicine ICU Progress Note  Chief complaint : Acute respiratory failure    Subjunctive/24 hour events :   Patient seen and examined during multidisciplinary rounds with RN, charge nurse, RT, pharmacy, dietitian, and social service. Patient on vent, currently in prone position, had significant hypoxia during the night requiring more sedation, he calmed down after adding Versed, Precedex caused bradycardia, he is currently on propofol fentanyl and Versed at 2 mg/h, temperature 99.6, he is off Levophed now, currently on heparin drip noted groundglass vomit yesterday, he did not tolerate tube feed yesterday initially,no bowel movement, urine output 200 cc he is +5 L. Social History     Tobacco Use    Smoking status: Former Smoker     Packs/day: 1.50     Years: 30.00     Pack years: 45.00     Types: Cigarettes     Start date:      Quit date:      Years since quittin.9    Smokeless tobacco: Never Used   Substance Use Topics    Alcohol use:  Yes     Alcohol/week: 7.0 standard drinks     Types: 7 Cans of beer per week         Problem Relation Age of Onset    Heart Attack Mother     Cancer Father         bladder cancer    Cancer Sister         uterine cancer    Cancer Sister         breast cancer       Recent Labs     21  0134 21  08   PHART 7.220* 7.121*   [id number] 45 58*   PO2ART 54* 70*       MV Settings:  Vent Mode: AC/VC Rate Set: 28 bmp/Vt Ordered: 340 mL/ /FiO2 : 100 %           IV:   midazolam 2 mg/hr (21 0241)    norepinephrine Stopped (21 0804)    dextrose      sodium chloride      heparin (PORCINE) Infusion 10.403 Units/kg/hr (21 0656)    fentaNYL (SUBLIMAZE) infusion 110 mcg/hr (21 2340)    propofol 50 mcg/kg/min (21 0616)    sodium chloride 100 mL/hr at 21 0027    dexmedetomidine (PRECEDEX) IV infusion Stopped (21 024)       Vitals:  /71   Pulse 66   Temp 99.6 °F (37.6 °C) (Axillary) Resp 25   Ht 5' 8\" (1.727 m)   Wt 262 lb 12.6 oz (119.2 kg)   SpO2 93%   BMI 39.96 kg/m²    Tmax:        Intake/Output Summary (Last 24 hours) at 12/24/2021 0817  Last data filed at 12/24/2021 2277  Gross per 24 hour   Intake 4443 ml   Output 400 ml   Net 4043 ml       EXAM:  General: On vent, currently in prone position  Head: normocephalic, atraumatic  Eyes:No gross abnormalities. ENT:  MMM no lesions  Neck:  supple and no masses  Chest : Currently in prone position, on vent, unresponsive  Heart[de-identified] Heart sounds are normal.  Regular rate and rhythm without murmur, gallop or rub. ABD:  symmetric, soft, non-tender, no guarding or rebound  Musculoskeletal : no cyanosis, no clubbing and no edema  Neuro:  Unresponsive  Skin: No rashes or nodules noted. Lymph node:  no cervical nodes  Urology: Yes [facility]   Psychiatric: unresponsive    Medications:  Scheduled Meds:   chlorhexidine  15 mL Mouth/Throat BID    sennosides-docusate sodium  2 tablet Oral BID    polyethylene glycol  17 g Oral Daily    insulin lispro  0-6 Units SubCUTAneous 4 times per day    sodium chloride flush  5-40 mL IntraVENous 2 times per day    dexamethasone  6 mg IntraVENous Q24H    pantoprazole  40 mg IntraVENous Daily       PRN Meds:  glucose, dextrose, glucagon (rDNA), dextrose, sodium chloride flush, sodium chloride, heparin (porcine), heparin (porcine), hydrALAZINE, sodium chloride, midazolam, iopamidol    Results: reviewed by me   CBC:   Recent Labs     12/23/21  0600 12/23/21  0600 12/23/21  1452 12/23/21  1455 12/24/21  0134 12/24/21  0555 12/24/21  0800   WBC 10.5  --  7.3  --   --  12.8*  --    HGB 12.8*   < > 10.6*   < > 10.5* 11.4* 10.9*   HCT 38.6*  --  30.3*  --   --  34.9*  --    MCV 85.0  --  86.9  --   --  86.9  --      --  154  --   --  236  --     < > = values in this interval not displayed.      BMP:   Recent Labs     12/23/21  0100 12/23/21  0100 12/23/21  0559 12/23/21  1455 12/24/21  0134 12/24/21  0545 12/24/21  0800     --  136  --   --  140  --    K 5.0*  --  5.3*  --   --  5.3*  --      --  102  --   --  103  --    CO2 18*  --  19*  --   --  17*  --    BUN 44*  --  46*  --   --  71*  --    CREATININE 2.96*   < > 3.14*   < > 4.3* 5.21* 5.3*    < > = values in this interval not displayed. LIVER PROFILE:   Recent Labs     12/23/21  0100 12/23/21  0559 12/24/21  0545   * 473* 242*   * 159* 131*   BILITOT 0.4 0.3 <0.2   ALKPHOS 81 86 85     PT/INR:   Recent Labs     12/23/21  1424   PROTIME 14.6   INR 1.1     APTT:   Recent Labs     12/23/21  1424   APTT 32.2     UA:No results for input(s): NITRITE, COLORU, PHUR, LABCAST, WBCUA, RBCUA, MUCUS, TRICHOMONAS, YEAST, BACTERIA, CLARITYU, SPECGRAV, LEUKOCYTESUR, UROBILINOGEN, BILIRUBINUR, BLOODU, GLUCOSEU, AMORPHOUS in the last 72 hours. Invalid input(s): Heike Batter    Cultures:  Negative so far  Films:  CXR reviewed by me and it showed bilateral groundglass infiltrates      Assessment:   This is a critically ill patient at risk of deterioration / death , needing close ICU monitoring and intervention due to below noted problems   · Acute hypoxic respiratory failure  · Severe COVID-19 pneumonia  · Septic shock, currently off pressors, shock physiology resolved  · Acute kidney injury  · Abnormal LFT      Recommendation  · Vent support lung protective strategy  · head of the bed 30°  · Add Nimbex  · Sedation with combination propofol and fentanyl target R ASS of 0 to -1, using Versed for appropriate sedation to allow compliance with the vent  · Watch for ICU delirium: TV on, natural light, avoid benzos, pain control, early mobility, and family engagement  · PUD prophylaxis  · DVT prophylaxis  · Start Nimbex,  · Chest x-ray tomorrow  · Target blood sugar 140-180  ·  change Protonix to twice daily  · reglan TID   · LFT improved, will give actemra    · LFTs still higher than 5 times normal, remdesivir not an option at this point  · Due to coffee-ground emesis, will increase PPI to twice daily, will DC heparin drip change to prophylactic dose heparin  · Keep Levophed on standby for now  · Continue Lokelma  · Lactulose x3 doses  · Resume tube feeds at 10 cc/h and monitor        Due to the immediate potential for life-threatening deterioration due to acute respiratory failure I spent 40  minutes providing critical care.  This time is excluding time spent performing procedures.           Electronically signed by Jaja Nielsen MD,  FCCP ,on 12/24/2021 at 8:17 AM

## 2021-12-24 NOTE — PROGRESS NOTES
Patient ID:  Laurie s  [id number]  64 y.o.  1960    23:15 Assumed Care of Patient. Disaster Documentation initiated as per policy / SOP. Report Received from [address] [place] [address]. 23:30Assessment Complete, please see flow sheets. Labs, orders, plan of care and meds reviewed. Resp are labored, Tachy. Patient sats are 85-88% Resp called to check tube. Suctioned for minimal amount of clear fluid. O2 increased to 100%. Precedex gtt decreased due to HR down to 48-50 and Fentanyl increased to 110 mcg/hr for sedation. 00:30 Dark Brown secretions coming from oral cavity, OG to sx with minimal TF residual noted >5cc. Mouth care given. Gtts maintained at current settings. Accucheck performed and 1 Unit insulin given. 01:30 Resp at bedside, discussed concerns regarding resp rate and O2 Sats decreased despite being on 100%. ABG's drawn. 02:00 Perfect Serve sent to Dr. Vargas [place] Regarding \" Kar Carroll keep his sats more than 88 despite 100% O2. Abgs drawn, were poor 7.22 PO2 54. He is breathing over the vent and unsynced. Brown coffee grounds like substance coming from mouth, OJ sx shows nothing. Not making urine. Maxed on Propofol, HR 40-50 cannot tolerated going up on Precedex. Prone, unable to tolerate supine more than 10 minutes. Just Gave 2 mg of versed that I had PRN order for and it has helped\"  Order received for Versed Drip. Started at 2 mg/hr per orders. Repositioned patient due to stating to slide off bed to the left. Did not tolerate well, Desat to 60%. BP dropped to a map of 60 , not recovering, Levophed Drip restarted at 2.0 mcg/min.    03:30 Vital signs now stable. 04:04 BP 86/69 (78) Levophed increased to 4.0 mcg/min   04:53  VS  SB at 59 95/86 O2 Sat 93% Maintaining current infusions. 5:30 Ordered albs drawn and sent. 6:30 Heparin gtt maintained,  Xa 0.59 = no change per orders   07:10 Dr. Fidel Sullivan at bedside, update given. 07:50 End of shift report given to [address] [place] [address].       Intake/Output

## 2021-12-24 NOTE — PROGRESS NOTES
0800- patient in prone position on ventilator support, HR sinus rhythm on monitor, BP supported by levo then turned off, see MAR. O2 sat in 90s. Pt. Slightly breathing over vent, rate in 30s. Labored breathing with accessory muscle use. Pt. On propofol, fentanyl, and versed for sedation. Heparin drip d/c. Small amount of coffee ground oral secretions noted, Dr. Danyelle Lala aware. Tube feed remains paused at this time due to not tolerating it. Ordered to start nimbex on pt. , will start when received from pharmacy. After Nimbex started, plan to supine pt. And see if he can tolerate it. Pt. Will also be started on hemodialysis, will call wife for consent of temporary hemodialysis catheter. 1030- baseline TOF 3 amps 4/4 twitches. Nimbex bolus given, Nimbex drip started. 1100- pt. Turned supine. Tolerated well, did not desat as O2 sat mid 90s. BP elevated 190s, will given hydralazine when have access to lines. Dr. Danyelle Lala in room for insertion of hemodialysis catheter. Pt.'s wife gave telephone consent, consent verified by \[facility]\"" RN. 1300- patient in supine position, remains on ventilator support, sedation, and Nimbex gtts. Pt. nonresponsive due to paralytic. TOF 0/0 twitches, decreasing Nimbex rate. Extremities cool. O2 sat high 90s, HR stable, BP stable after 4mg versed bolus given and versed drip rate increased, see MAR. No more PRN orders given at this time. Levo remains off.     1700- patient turned prone. Desatted into 76s, slowly recovered to 90s. 1800- Dialysis started. BP tolerating at this time. Levo still off. Dialysis rate being slowed due to access in IJ. Cannot turn pt. Supine due to poor ABGs. Dr. Danyelle Lala made aware of ABGs by respiratory therapist Sharla Aldana. Tube feed remains off, minimal coffee ground secretions at this time. Bowel sounds hypoactive, no BM despite bowel medications. [facility] output 75cc for shift. Extremities remain cool, pt. Diaphoretic.  TOF still 0/4 yudelka. 1900- handoff report given to [facility]- returned call to pt.'s wife to update her on pt. Condition.

## 2021-12-24 NOTE — PROCEDURES
Internal jugular central venous dialysis catheter; Ultrasound guided. [id number]                                                             [id number]    INDICATION:    Need for hemodialysis      CONSENT:  The spouse was counseled regarding the procedure, it's indications, risks, potential complications and alternatives and any questions were answered. Consent was obtained. PROCEDURE SUMMARY:   A time-out was performed. The patient's left neck region was prepped and draped in sterile fashion. The left internal jugular vein was accessed under ultrasound guidance using a finder needle and sheath. U/S images were permanently documented. Anesthesia was achieved with 1% lidocaine. The finder needle was inserted into the left neck under direct ultrasound guidance, and venous blood was withdrawn. The introducer needle was then inserted under direct ultrasound guidance and venous blood was withdrawn into the syringe. The syringe was removed and the guidewire was advanced through the introducer needle. A small incision was made with a scalpel and the introducer needle was removed. A dilator was advanced over the guidewire until appropriate dilation was obtained. The dilator was removed and an central venous  catheter was advanced over the guidewire and secured into place with 2 sutures at 20 cm, the line terminates in the superior vena cava central venous system. At time of procedure completion, all ports aspirated and flushed properly.        Estimated Blood loss   less than 5 cc    COMPLICATIONS:  None

## 2021-12-24 NOTE — PROGRESS NOTES
Hospitalist Progress Note      PCP: Janes Napoles MD    Date of Admission: 12/22/2021    Chief Complaint:  Patient remains intubated, on mechanical ventilation, sedated with fentanyl, propofol and midazolam, no fevers, off norepinephrine infusion    Medications:  Reviewed    Infusion Medications    midazolam 2 mg/hr (12/24/21 0241)    cisatracurium (NIMBEX) infusion      norepinephrine Stopped (12/24/21 0804)    dextrose      sodium chloride      fentaNYL (SUBLIMAZE) infusion 110 mcg/hr (12/23/21 2340)    propofol 50 mcg/kg/min (12/24/21 0616)     Scheduled Medications    pantoprazole  40 mg IntraVENous BID    cisatracurium Besylate  10 mg IntraVENous Once    lactulose  20 g Oral TID    tocilizumab (ACTEMRA-SUBQ) IVPB  810 mg IntraVENous Once    sodium zirconium cyclosilicate  10 g Oral Daily    heparin (porcine)  7,500 Units SubCUTAneous 3 times per day    chlorhexidine  15 mL Mouth/Throat BID    sennosides-docusate sodium  2 tablet Oral BID    polyethylene glycol  17 g Oral Daily    insulin lispro  0-6 Units SubCUTAneous 4 times per day    sodium chloride flush  5-40 mL IntraVENous 2 times per day    dexamethasone  6 mg IntraVENous Q24H     PRN Meds: glucose, dextrose, glucagon (rDNA), dextrose, sodium chloride flush, sodium chloride, heparin (porcine), heparin (porcine), hydrALAZINE, sodium chloride, midazolam, iopamidol      Intake/Output Summary (Last 24 hours) at 12/24/2021 1002  Last data filed at 12/24/2021 8601  Gross per 24 hour   Intake 4443 ml   Output 400 ml   Net 4043 ml       Exam:    /71   Pulse 76   Temp 99.6 °F (37.6 °C) (Axillary)   Resp 30   Ht 5' 8\" (1.727 m)   Wt 262 lb 12.6 oz (119.2 kg)   SpO2 98%   BMI 39.96 kg/m²     General appearance: intubated, sedated  Respiratory:  diminished bilaterally   Cardiovascular: Regular rate and rhythm, S1/S2 . Abdomen: Soft, active bowel sounds. Musculoskeletal: No edema bilaterally.      Labs:   Recent Labs 12/23/21  0600 12/23/21  0600 12/23/21  1452 12/23/21  1455 12/24/21  0134 12/24/21  0555 12/24/21  0800   WBC 10.5  --  7.3  --   --  12.8*  --    HGB 12.8*   < > 10.6*   < > 10.5* 11.4* 10.9*   HCT 38.6*  --  30.3*  --   --  34.9*  --      --  154  --   --  236  --     < > = values in this interval not displayed. Recent Labs     12/23/21  0100 12/23/21  0100 12/23/21  0559 12/23/21  1455 12/24/21  0134 12/24/21  0545 12/24/21  0800     --  136  --   --  140  --    K 5.0*  --  5.3*  --   --  5.3*  --      --  102  --   --  103  --    CO2 18*  --  19*  --   --  17*  --    BUN 44*  --  46*  --   --  71*  --    CREATININE 2.96*   < > 3.14*   < > 4.3* 5.21* 5.3*   CALCIUM 8.1*  --  7.8*  --   --  7.2*  --     < > = values in this interval not displayed. Recent Labs     12/23/21  0100 12/23/21  0559 12/24/21  0545   * 473* 242*   * 159* 131*   BILITOT 0.4 0.3 <0.2   ALKPHOS 81 86 85     Recent Labs     12/23/21  1424   INR 1.1     Recent Labs     12/22/21  1630   TROPONINI <0.010       Urinalysis:    No results found for: Vilinda Fought, BACTERIA, RBCUA, BLOODU, SPECGRAV, GLUCOSEU    Radiology:  IR PICC WO SQ PORT/PUMP > 5 YEARS   Final Result      CT HEAD WO CONTRAST   Final Result   Impression:      Pansinusitis. All CT scans at this facility use dose modulation, iterative reconstruction, and/or weight based dosing when appropriate to reduce radiation dose to as low as reasonably achievable. XR CHEST PORTABLE   Final Result      LEFT INTERNAL JUGULAR APPROACH CENTRAL VENOUS CATHETER PLACED IN EXPECTED POSITION. OTHERWISE, STABLE CHEST FROM YESTERDAY. XR CHEST ABDOMEN NG PLACEMENT   Final Result      ENDOTRACHEAL AND OROGASTRIC TUBES IN EXPECTED POSITIONS. US RETROPERITONEAL LIMITED   Final Result      ESSENTIALLY NEGATIVE LIMITED RENAL ULTRASOUND.                    XR CHEST PORTABLE   Final Result      XR CHEST PORTABLE   Final Result   There is severe increased pulmonary markings throughout both lungs worrisome for fluid overload versus viral infiltrates, pneumonia.             XR CHEST PORTABLE    (Results Pending)           Assessment/Plan:    63 y/o man with history of morbid obesity who presented with:    Acute hypoxic respiratory failure   - due to COVID pneumonia  - requiring intubation and mechanical ventilation  - on Decadron  - no Remdesivir or Baricitinib due to elevated LTFs  - Actemra ordered  - procalcitonin is elevated,   - blood cultures no growth  - management per critical care and ID service    Shock  - off norepinephrine infusion  - management per critical care    SUREKHA, AG metabolic acidosis  - plan to start dialysis today  - management per nephrology    Elevated LFTs  - improving       Diet: Diet NPO  ADULT TUBE FEEDING; Orogastric; Renal Formula; Continuous; 10; No; 100; Q 4 hours    Code Status: Full Code              Electronically signed by Tuyet Szymanski MD on 12/24/2021 at 10:02 AM

## 2021-12-24 NOTE — CONSULTS
Infectious Disease     Patient Name: Harleen Griggs  Date: 2021  YOB: 1960  Medical Record Number: [id number]        Chief Complaint   Patient presents with    Shortness of Breath          History of Present Illness:      Patient had presented to the hospital with shortness of breath for about a week. Diagnosed with COVID. Significantly hypoxic on arrival. Very high blood pressures. Intubated quickly after presentation. Currently in ICU on vent. Now was on vasopressors, required sedation. Developed coffee ground emesis       Review of Systems: can not be obtained from patient      Social History     Tobacco Use    Smoking status: Former Smoker     Packs/day: 1.50     Years: 30.00     Pack years: 45.00     Types: Cigarettes     Start date:      Quit date:      Years since quittin.9    Smokeless tobacco: Never Used   Vaping Use    Vaping Use: Never used   Substance Use Topics    Alcohol use: Yes     Alcohol/week: 7.0 standard drinks     Types: 7 Cans of beer per week    Drug use: No         Past Medical History:   Diagnosis Date    History of testicular cancer            Past Surgical History:   Procedure Laterality Date    TESTICLE REMOVAL Left     with LN dissection (Seperarate surgery )          No current facility-administered medications on file prior to encounter. No current outpatient medications on file prior to encounter. Allergies   Allergen Reactions    Pcn [Penicillins] Shortness Of Breath and Swelling     Was given as a child for Bee sting: swelling and SoB         Family History   Problem Relation Age of Onset    Heart Attack Mother     Cancer Father         bladder cancer    Cancer Sister         uterine cancer    Cancer Sister         breast cancer         Physical Exam:     Blood pressure 119/71, pulse 76, temperature 99.6 °F (37.6 °C), temperature source Axillary, resp.  rate 30, height 5' 8\" (1.727 m), weight 262 lb 12.6 oz (119.2 kg), SpO2 98 %.  General: ventilator  Skin: no new rashes  HEENT:  Neck is supple, No subconjunctival hemorrhages, ET tube  Heart: S1 S2  Lungs: diminished throughout  Abdomen: soft, ND, NTTP,   Back :no CVA tenderness  Extrem: No edema, non tender  Neuro exam: sedated  Psych: sedated    Labs: I have reviewed all lab results by electronic record, including most recent CBC, metabolic panel, and pertinent abnormalities were addressed from an infectious disease perspective. Trends are being monitored over time. Lab Results   Component Value Date    WBC 12.8 (H) 12/24/2021    HGB 10.9 (L) 12/24/2021    HCT 34.9 (L) 12/24/2021    MCV 86.9 12/24/2021     12/24/2021     Lab Results   Component Value Date     12/24/2021    K 5.3 12/24/2021     12/24/2021    CO2 17 12/24/2021    BUN 71 12/24/2021    CREATININE 5.3 12/24/2021    CREATININE 5.21 12/24/2021    GLUCOSE 157 12/24/2021    CALCIUM 7.2 12/24/2021        Radiology:  I have reviewed imaging results per electronic record and most pertinent abnormalities are being addressed from an infectious disease standpoint. ASSESSMENT:  Acute hypoxic respiratory failure  COVID-19 pneumonia  Acute kidney injury    Patient very ill-appearing. He is already had multiple complications develop including hypertension kidney injury coffee-ground emesis, etc.      PLAN:  Pt was given actemra  Procalcitonin is up but in the setting of renal failure now, would trend it,  low threshold to initiate antibiotics  .

## 2021-12-25 NOTE — PROGRESS NOTES
Pharmacy Note  Vancomycin Consult    Charu Booth is a 64 y.o. male started on Vancomycin for sepsis of unknown etiology; consult received from Dr. Cynthia Galeana to manage therapy. Also receiving the following antibiotics: meropenem (pending allergy clarification). Acute respiratory failure with hypoxia ([address] [J96.01]  Acute hypoxemic respiratory failure due to COVID-19 ([facility] [address] [U07.1, J96.01]  Allergies: Pcn [penicillins]    Cultures  Recent Labs     12/22/21  1934 12/22/21  1827 12/22/21  1635   BC  --   --  No Growth to date. Any change in status will be called. BLOODCULT2 No Growth to date. Any change in status will be called. --   --    COVID19  --  DETECTED*  --      Height: 5' 8\" (172.7 cm), Weight: 262 lb 12.6 oz (119.2 kg), Body mass index is 39.96 kg/m². MRSA Nasal swab:N/a, does not meet criteria    Recent Labs     12/25/21  0847   CREATININE 6.60*   Estimated Creatinine Clearance: 15 mL/min (A) (based on SCr of 6.6 mg/dL [facility])). Hemodialysis Intake (ml): 400 mlDialyzer Clearance: Lightly streaked. Goal Trough Level: 21-24mcg/mL for MRSA; 15-20 for non MRSA     Assessment/Plan:  Will initiate Vancomycin 25mg/kg X 1 dose, or vancomycin 2000mg IV X 1 as a loading dose, per protocol. Patient to have hemodialysis tomorrow, will give vancomycin 1250mg IV X 1 dose post hemodialysis on 12/26/21. Will need to check hemodialysis schedule and order trough level prior to the next hemodialysis schedule (anticipate this will be 12/28/21). Thank you for the consult. Will continue to follow.      Madelin Borjas PharmD   12/25/2021 11:20 AM

## 2021-12-25 NOTE — PROGRESS NOTES
Pulmonary & Critical Care Medicine ICU Progress Note  Chief complaint : Acute respiratory failure    Subjunctive/24 hour events :   Patient seen and examined during multidisciplinary rounds with RN, charge nurse, RT, pharmacy, dietitian, and social service. Patient on vent, currently in prone position, he is on fentanyl propofol and Versed, and on Nimbex drip, currently off Levophed, heparin drip was discontinued yesterday due to GI bleed, he is on 90% FiO2, 14 of PEEP, he underwent dialysis yesterday, urine output 100 cc, no fever overnight,      Social History     Tobacco Use    Smoking status: Former Smoker     Packs/day: 1.50     Years: 30.00     Pack years: 45.00     Types: Cigarettes     Start date:      Quit date:      Years since quittin.9    Smokeless tobacco: Never Used   Substance Use Topics    Alcohol use:  Yes     Alcohol/week: 7.0 standard drinks     Types: 7 Cans of beer per week         Problem Relation Age of Onset    Heart Attack Mother     Cancer Father         bladder cancer    Cancer Sister         uterine cancer    Cancer Sister         breast cancer       Recent Labs     21  1527 21  2345   PHART 6.819* 7.038*   [id number] 145* 95*   PO2ART 99* 101*       MV Settings:  Vent Mode: AC/VC Rate Set: 32 bmp/Vt Ordered: 410 mL/ /FiO2 : 99 %           IV:   midazolam 2 mg/hr (21)    cisatracurium (NIMBEX) infusion 0.5 mcg/kg/min (21)    norepinephrine Stopped (21 0804)    dextrose      sodium chloride      fentaNYL (SUBLIMAZE) infusion 200 mcg/hr (21)    propofol 50 mcg/kg/min (21)       Vitals:  BP (!) 154/60   Pulse 79   Temp 99.5 °F (37.5 °C) (Axillary)   Resp 20   Ht 5' 8\" (1.727 m)   Wt 262 lb 12.6 oz (119.2 kg)   SpO2 99%   BMI 39.96 kg/m²    Tmax:        Intake/Output Summary (Last 24 hours) at 2021 0759  Last data filed at 2021 0400  Gross per 24 hour   Intake 2533 ml   Output 3100 ml Net -567 ml       EXAM:  General: On vent, currently in prone position  Head: normocephalic, atraumatic  Eyes:No gross abnormalities. ENT:  MMM no lesions  Neck:  supple and no masses  Chest : In prone position, on vent, bilateral rales, no wheezing, no subcutaneous air, nontender, tympanic  Heart[de-identified] Heart sounds are normal.  Regular rate and rhythm without murmur, gallop or rub. ABD:  symmetric, soft, non-tender, no guarding or rebound  Musculoskeletal : no cyanosis, no clubbing and no edema  Neuro:  Unresponsive  Skin: No rashes or nodules noted. Lymph node:  no cervical nodes  Urology: Yes [place]   Psychiatric: unresponsive    Medications:  Scheduled Meds:   pantoprazole  40 mg IntraVENous BID    lactulose  20 g Oral TID    sodium zirconium cyclosilicate  10 g Oral Daily    heparin (porcine)  7,500 Units SubCUTAneous 3 times per day    chlorhexidine  15 mL Mouth/Throat BID    sennosides-docusate sodium  2 tablet Oral BID    polyethylene glycol  17 g Oral Daily    insulin lispro  0-6 Units SubCUTAneous 4 times per day    sodium chloride flush  5-40 mL IntraVENous 2 times per day    dexamethasone  6 mg IntraVENous Q24H       PRN Meds:  labetalol, glucose, dextrose, glucagon (rDNA), dextrose, sodium chloride flush, sodium chloride, heparin (porcine), heparin (porcine), hydrALAZINE, sodium chloride, midazolam, iopamidol    Results: reviewed by me   CBC:   Recent Labs     12/23/21  1452 12/23/21  1455 12/24/21  0555 12/24/21  0800 12/24/21  1527 12/24/21  2345 12/25/21  0448   WBC 7.3  --  12.8*  --   --   --  14.7*   HGB 10.6*   < > 11.4*   < > 14.0 13.4* 12.5*   HCT 30.3*  --  34.9*  --   --   --  37.6*   MCV 86.9  --  86.9  --   --   --  87.1     --  236  --   --   --  263    < > = values in this interval not displayed.      BMP:   Recent Labs     12/23/21  0559 12/23/21  1455 12/24/21  0545 12/24/21  0800 12/24/21  1527 12/24/21  2345 12/25/21  0448     --  140  --   --   --  137   K 5.3* --  5.3*  --   --   --  6.3*     --  103  --   --   --  96   CO2 19*  --  17*  --   --   --  20   BUN 46*  --  71*  --   --   --  77*   CREATININE 3.14*   < > 5.21*   < > 6.1* 6.1* 6.20*    < > = values in this interval not displayed. LIVER PROFILE:   Recent Labs     12/23/21  0559 12/24/21  0545 12/25/21  0448   * 242* 208*   * 131* 140*   BILITOT 0.3 <0.2 0.5   ALKPHOS 86 85 115*     PT/INR:   Recent Labs     12/23/21  1424   PROTIME 14.6   INR 1.1     APTT:   Recent Labs     12/23/21  1424   APTT 32.2     UA:No results for input(s): NITRITE, COLORU, PHUR, LABCAST, WBCUA, RBCUA, MUCUS, TRICHOMONAS, YEAST, BACTERIA, CLARITYU, SPECGRAV, LEUKOCYTESUR, UROBILINOGEN, BILIRUBINUR, BLOODU, GLUCOSEU, AMORPHOUS in the last 72 hours. Invalid input(s): Lyda Boxer    Cultures:  Negative so far  Films:  CXR reviewed by me and it showed bilateral groundglass infiltrate      Assessment:   This is a critically ill patient at risk of deterioration / death , needing close ICU monitoring and intervention due to below noted problems   · Acute hypoxic respiratory failure  · Severe COVID-19 pneumonia  · Septic shock, currently off pressors, shock physiology resolved  · Acute kidney injury  · Abnormal LFT      Recommendation  · Vent support lung protective strategy  · head of the bed 30°  · Wean off Nimbex drip and evaluate lung mechanics  · Sedation with combination propofol and fentanyl target R ASS of 0 to -1, using Versed for appropriate sedation to allow compliance with the vent  · Watch for ICU delirium: TV on, natural light, avoid benzos, pain control, early mobility, and family engagement  · PUD prophylaxis  · DVT prophylaxis  · Target blood sugar 140-180  ·  change Protonix to twice daily  · reglan TID   · Continue Lokelma          Due to the immediate potential for life-threatening deterioration due to acute respiratory failure I spent 35  minutes providing critical care.  This time is excluding time spent performing procedures.           Electronically signed by Mona Ferraro MD,  FCCP ,on 12/25/2021 at 7:59 AM

## 2021-12-25 NOTE — PROGRESS NOTES
Nephrology Progress Note    1. SUREKHA: baseline function unknown, 2/2 hemodynamic instability, baseline unknown, nml function years ago however now presented Scr in the 2s, worsening  2. Acute hypoxic respiratory failure: 2/2 covid-19 PNA, intubated  3. Shock: off pressors   4. Hyperkalemia  5. Metabolic acidosis: 2/2 lactic acidosis and renal failure  6. Hypoalbuminemia  7. Transaminitis      Plan:  - will plan for HD tomorrow during supine session  - manage hyperkalemia medically       Patient Active Problem List:     Acute respiratory failure with hypoxia (HCC)      Subjective:  Admit Date: 12/22/2021    Interval History:   Attempted HD yesterday but catheter was positional due to patient being in prone position, remains off pressors, K elevated this AM     Medications:  Scheduled Meds:   metoclopramide  10 mg IntraVENous Q8H    meropenem  1,000 mg IntraVENous Q8H    vancomycin  1,000 mg IntraVENous Once    pantoprazole  40 mg IntraVENous BID    lactulose  20 g Oral TID    sodium zirconium cyclosilicate  10 g Oral Daily    heparin (porcine)  7,500 Units SubCUTAneous 3 times per day    chlorhexidine  15 mL Mouth/Throat BID    sennosides-docusate sodium  2 tablet Oral BID    polyethylene glycol  17 g Oral Daily    insulin lispro  0-6 Units SubCUTAneous 4 times per day    sodium chloride flush  5-40 mL IntraVENous 2 times per day    dexamethasone  6 mg IntraVENous Q24H     Continuous Infusions:   midazolam 2 mg/hr (12/25/21 0444)    cisatracurium (NIMBEX) infusion 0.5 mcg/kg/min (12/25/21 1003)    norepinephrine Stopped (12/24/21 0804)    dextrose      sodium chloride      fentaNYL (SUBLIMAZE) infusion 200 mcg/hr (12/25/21 1002)    propofol 50 mcg/kg/min (12/25/21 1003)       CBC:   Recent Labs     12/24/21  0555 12/24/21  0800 12/24/21  2345 12/25/21  0448   WBC 12.8*  --   --  14.7*   HGB 11.4*   < > 13.4* 12.5*     --   --  263    < > = values in this interval not displayed.      CMP: Recent Labs     12/24/21  0545 12/24/21  0800 12/24/21  2345 12/25/21  0448 12/25/21  0847     --   --  137 136   K 5.3*  --   --  6.3* 5.9*     --   --  96 95   CO2 17*  --   --  20 21   BUN 71*  --   --  77* 78*   CREATININE 5.21*   < > 6.1* 6.20* 6.60*   GLUCOSE 157*  --   --  143* 183*   CALCIUM 7.2*  --   --  7.2* 7.4*   LABGLOM 11.3*   < > 9* 9.2* 8.6*    < > = values in this interval not displayed. Troponin:   Recent Labs     12/22/21  1630   TROPONINI <0.010     BNP: No results for input(s): BNP in the last 72 hours. INR:   Recent Labs     12/23/21  1424   INR 1.1     Lipids: No results for input(s): CHOL, LDLDIRECT, TRIG, HDL, AMYLASE, LIPASE in the last 72 hours. Liver:   Recent Labs     12/25/21  0448   *   *   ALKPHOS 115*   PROT 6.2*   LABALBU 2.6*   BILITOT 0.5     Iron:    Recent Labs     12/24/21  0545   FERRITIN 6,689*     Urinalysis: No results for input(s): UA in the last 72 hours.     Objective:  Vitals: BP (!) 145/63   Pulse 76   Temp 99 °F (37.2 °C) (Oral)   Resp 23   Ht 5' 8\" (1.727 m)   Wt 262 lb 12.6 oz (119.2 kg)   SpO2 97%   BMI 39.96 kg/m²    Wt Readings from Last 3 Encounters:   12/24/21 262 lb 12.6 oz (119.2 kg)   08/21/18 254 lb (115.2 kg)      24HR INTAKE/OUTPUT:      Intake/Output Summary (Last 24 hours) at 12/25/2021 1126  Last data filed at 12/25/2021 0400  Gross per 24 hour   Intake 2533 ml   Output 3100 ml   Net -567 ml        General: intubated, sedated, prone   HEENT: normocephalic, atraumatic, ETT in place  Lungs: intubated, on vent, coarse breath sounds  Heart: regular rate and rhythm, no murmurs or rubs  Abdomen: soft, non-tender, non-distended  MSK: no joint swelling or tenderness  Ext: no cyanosis, ++ peripheral edema  Neuro: sedated       Electronically signed by Sheila Higgins MD, MD

## 2021-12-25 NOTE — PROGRESS NOTES
PHARMACY NOTE:   Renal Adjustment Per Protocol: meropenem 1g IV Q8 hours changed to meropenem 500mg IV Q24 hours based on hospital renal dosing protocol for patients on hemodialysis      Recent Labs     12/25/21  0847   CREATININE 6.60*   Estimated Creatinine Clearance: 15 mL/min (A) (based on SCr of 6.6 mg/dL [place] [facility])). Hemodialysis Intake (ml): 400 mlDialyzer Clearance: Lightly streaked.     Mita Ramirez, PharmD, BCPS   12/25/2021 11:10 AM

## 2021-12-25 NOTE — PROGRESS NOTES
Hospitalist Progress Note      PCP: Eric Santana MD    Date of Admission: 12/22/2021    Chief Complaint:  Patient remains intubated, on mechanical ventilation, sedated with fentanyl, propofol and midazolam, paralyzed with Nimbex, started on dialysis    Medications:  Reviewed    Infusion Medications    midazolam 2 mg/hr (12/25/21 0444)    cisatracurium (NIMBEX) infusion 0.5 mcg/kg/min (12/25/21 0519)    norepinephrine Stopped (12/24/21 0804)    dextrose      sodium chloride      fentaNYL (SUBLIMAZE) infusion 200 mcg/hr (12/25/21 0520)    propofol 50 mcg/kg/min (12/25/21 0844)     Scheduled Medications    metoclopramide  10 mg IntraVENous Q8H    pantoprazole  40 mg IntraVENous BID    lactulose  20 g Oral TID    sodium zirconium cyclosilicate  10 g Oral Daily    heparin (porcine)  7,500 Units SubCUTAneous 3 times per day    chlorhexidine  15 mL Mouth/Throat BID    sennosides-docusate sodium  2 tablet Oral BID    polyethylene glycol  17 g Oral Daily    insulin lispro  0-6 Units SubCUTAneous 4 times per day    sodium chloride flush  5-40 mL IntraVENous 2 times per day    dexamethasone  6 mg IntraVENous Q24H     PRN Meds: labetalol, glucose, dextrose, glucagon (rDNA), dextrose, sodium chloride flush, sodium chloride, heparin (porcine), heparin (porcine), hydrALAZINE, sodium chloride, midazolam, iopamidol      Intake/Output Summary (Last 24 hours) at 12/25/2021 1005  Last data filed at 12/25/2021 0400  Gross per 24 hour   Intake 2533 ml   Output 3100 ml   Net -567 ml       Exam:    BP (!) 145/63   Pulse 76   Temp 99 °F (37.2 °C) (Oral)   Resp 23   Ht 5' 8\" (1.727 m)   Wt 262 lb 12.6 oz (119.2 kg)   SpO2 97%   BMI 39.96 kg/m²     General appearance: intubated, sedated  Respiratory:  diminished bilaterally   Cardiovascular: Regular rate and rhythm, S1/S2 . Abdomen: Soft, active bowel sounds. Musculoskeletal: No edema bilaterally.      Labs:   Recent Labs     12/23/21  1452 12/23/21  1455 12/24/21  0555 12/24/21  0800 12/24/21  1527 12/24/21  2345 12/25/21  0448   WBC 7.3  --  12.8*  --   --   --  14.7*   HGB 10.6*   < > 11.4*   < > 14.0 13.4* 12.5*   HCT 30.3*  --  34.9*  --   --   --  37.6*     --  236  --   --   --  263    < > = values in this interval not displayed. Recent Labs     12/24/21  0545 12/24/21  0800 12/24/21  2345 12/25/21  0448 12/25/21  0847     --   --  137 136   K 5.3*  --   --  6.3* 5.9*     --   --  96 95   CO2 17*  --   --  20 21   BUN 71*  --   --  77* 78*   CREATININE 5.21*   < > 6.1* 6.20* 6.60*   CALCIUM 7.2*  --   --  7.2* 7.4*    < > = values in this interval not displayed. Recent Labs     12/23/21  0559 12/24/21  0545 12/25/21  0448   * 242* 208*   * 131* 140*   BILITOT 0.3 <0.2 0.5   ALKPHOS 86 85 115*     Recent Labs     12/23/21  1424   INR 1.1     Recent Labs     12/22/21  1630   TROPONINI <0.010       Urinalysis:    No results found for: Marla Font, BACTERIA, RBCUA, BLOODU, SPECGRAV, GLUCOSEU    Radiology:  XR CHEST (SINGLE VIEW FRONTAL)   Final Result      There are bilateral alveolar opacities , infiltrates worrisome for viral COVID-19 pneumonia. IR PICC WO SQ PORT/PUMP > 5 YEARS   Final Result      CT HEAD WO CONTRAST   Final Result   Impression:      Pansinusitis. All CT scans at this facility use dose modulation, iterative reconstruction, and/or weight based dosing when appropriate to reduce radiation dose to as low as reasonably achievable. XR CHEST PORTABLE   Final Result      LEFT INTERNAL JUGULAR APPROACH CENTRAL VENOUS CATHETER PLACED IN EXPECTED POSITION. OTHERWISE, STABLE CHEST FROM YESTERDAY. XR CHEST ABDOMEN NG PLACEMENT   Final Result      ENDOTRACHEAL AND OROGASTRIC TUBES IN EXPECTED POSITIONS. US RETROPERITONEAL LIMITED   Final Result      ESSENTIALLY NEGATIVE LIMITED RENAL ULTRASOUND.                    XR CHEST PORTABLE   Final Result      XR CHEST PORTABLE   Final Result   There is severe increased pulmonary markings throughout both lungs worrisome for fluid overload versus viral infiltrates, pneumonia.             XR CHEST PORTABLE    (Results Pending)           Assessment/Plan:    63 y/o man with history of morbid obesity who presented with:    Acute hypoxic respiratory failure   - due to COVID pneumonia  - requiring intubation and mechanical ventilation  - on Decadron  - no Remdesivir or Baricitinib due to elevated LTFs  - s/p Actemra   - procalcitonin is elevated, started on broad spectrum antibiotics empirically  - blood cultures no growth  - management per critical care and ID service    Shock  - off norepinephrine infusion  - management per critical care    SUREKHA / hyperkalemia  - started on dialysis   - management per nephrology    Elevated LFTs  - follow trend     Diet: Diet NPO  ADULT TUBE FEEDING; Orogastric; Renal Formula; Continuous; 10; No; 100; Q 4 hours    Code Status: Full Code        Electronically signed by Driss Guzmán MD on 12/25/2021 at 10:05 AM

## 2021-12-25 NOTE — PROGRESS NOTES
Daily Update    From:HOME WITH SPOUSE, INDEP, NO 02, NO CHILDREN     PMH: ETOH, TESTICULAR CA    Anticipated Discharge Date:TBD    Anticipated Discharge Disposition:TBD    Patient Mobility or PT/OT ordered:- NEEDED ONCE EXTUBATED  CONSULTS: ID, RENAL, PULM    Covid Test Date and Result: 12/22++, TESTED + PRIOR TO ER VISIT  K+ 5.0--5.3;  29/2.09--57/5.21--78/6.60; [place] 12.8--14.7 [place]  DDIMER-3.07--10.10--3.62    Barriers to Discharge: SR/VENT-100%>100%, PICC LINE, OG-TF, /SEDATION&NIMBEX, IV DECA, IV REMDES, IVF, MERREM,VANCO PRONE,, HD CATH AND HD BEING DONE NOW.     Risk of Unplanned Readmission:  17   -CMI/ACP DONE

## 2021-12-25 NOTE — PROGRESS NOTES
Am report received. Am assessment per flowsheet, patient remains prone at this time. BMP repeat collected, sent to lab and results updated to Dr. Josué Sherman. No dialysis today, patient will receive dialysis tomorrow. Will update Dr. Josiane Rodriguez as patient needs new art line and cxr. Meds per eMAR. Electronically signed by Yaya Bull RN on 12/25/2021 at 11:17 AM  1400: Patient placed supine at 1300. Xray called for chest xray. Spoke to patient wife and updated her.  Electronically signed by Yaya Bull RN on 12/25/2021 at 2:05 PM

## 2021-12-26 NOTE — PROGRESS NOTES
Hospitalist Progress Note      PCP: Cameron Rees MD    Date of Admission: 12/22/2021    Chief Complaint:  Patient remains intubated, on mechanical ventilation, sedated with fentanyl, propofol and midazolam, paralyzed with Nimbex, no fevers, stable HD, urine output very poor overnight, had another session of dialysis today with removal of 1 liter per nursing staff    Medications:  Reviewed    Infusion Medications    midazolam 2 mg/hr (12/25/21 0444)    cisatracurium (NIMBEX) infusion 1 mcg/kg/min (12/26/21 0940)    norepinephrine Stopped (12/24/21 0804)    dextrose      sodium chloride      fentaNYL (SUBLIMAZE) infusion 200 mcg/hr (12/26/21 0506)    propofol 50 mcg/kg/min (12/26/21 0906)     Scheduled Medications    lactulose  1 enema Rectal Once    metoclopramide  10 mg IntraVENous Q8H    meropenem  500 mg IntraVENous Q24H    vancomycin (VANCOCIN) intermittent dosing (placeholder)   Other RX Placeholder    vancomycin  1,250 mg IntraVENous Once    pantoprazole  40 mg IntraVENous BID    lactulose  20 g Oral TID    sodium zirconium cyclosilicate  10 g Oral Daily    heparin (porcine)  7,500 Units SubCUTAneous 3 times per day    chlorhexidine  15 mL Mouth/Throat BID    sennosides-docusate sodium  2 tablet Oral BID    polyethylene glycol  17 g Oral Daily    insulin lispro  0-6 Units SubCUTAneous 4 times per day    sodium chloride flush  5-40 mL IntraVENous 2 times per day    dexamethasone  6 mg IntraVENous Q24H     PRN Meds: labetalol, glucose, dextrose, glucagon (rDNA), dextrose, sodium chloride flush, sodium chloride, heparin (porcine), heparin (porcine), hydrALAZINE, sodium chloride, midazolam, iopamidol      Intake/Output Summary (Last 24 hours) at 12/26/2021 0954  Last data filed at 12/26/2021 0538  Gross per 24 hour   Intake 2206 ml   Output 650 ml   Net 1556 ml       Exam:    BP (!) 134/54   Pulse 106   Temp 99.1 °F (37.3 °C)   Resp (!) 33   Ht 5' 8\" (1.727 m)   Wt 270 lb 11.6 oz

## 2021-12-26 NOTE — PROGRESS NOTES
Pharmacy Vancomycin Consult     Vancomycin Day: 2  Current Dosing: Post-HD    Temp 24hr max:  99.1    Recent Labs     12/25/21  0448 12/25/21  0448 12/25/21  0847 12/25/21  1636 12/26/21  0600 12/26/21  0846   BUN 77*   < > 78*  --  90*  --    CREATININE 6.20*   < > 6.60*   < > 8.03* 6.7*   WBC 14.7*  --   --   --  15.8*  --     < > = values in this interval not displayed. Intake/Output Summary (Last 24 hours) at 12/26/2021 1351  Last data filed at 12/26/2021 1000  Gross per 24 hour   Intake 3006 ml   Output 2450 ml   Net 556 ml     Cultures  Recent Labs     12/25/21  1200 12/22/21  1934 12/22/21  1827   BC No Growth to date. Any change in status will be called. < >  --    BLOODCULT2 No Growth to date. Any change in status will be called. < >  --    COVID19  --   --  DETECTED*    < > = values in this interval not displayed. Height: 5' 8\" (172.7 cm), Weight: 270 lb 11.6 oz (122.8 kg), Body mass index is 41.16 kg/m². Estimated Creatinine Clearance: 15 mL/min (A) (based on SCr of 6.7 mg/dL [facility])). Hemodialysis Intake (ml): 800 mlDialyzer Clearance: Clotted. Assessment/Plan:  Per nephrology, patient will be TTS dialysis. Patient is scheduled to receive vancomycin 1250 mg x1 today post-HD. Will order pre-HD vancomycin level for 12/28 @ 0600 to guide further dosing.      Thank you,  Odette Anderson, PharmD  PGY-1 Pharmacy Resident  12/26/2021 1:51 PM

## 2021-12-26 NOTE — PROGRESS NOTES
Hemodialysis x 2 1/2 hours with 2K bath; Removed = -1000; LIJ CVC poor flows; clotted last 25 min of treatment. BFR at 200-250; Lines reversed; Frequent rinses. LIJ CVC capped & locked with heparin per lumen fill volume. Report given to [facility].

## 2021-12-26 NOTE — PROGRESS NOTES
Infectious Disease     Patient Name: Tera Johnson  Date: 2021  YOB: 1960  Medical Record Number: [id number]          Patient had presented to the hospital with shortness of breath for about a week. Diagnosed with COVID. Significantly hypoxic on arrival. Very high blood pressures. Intubated quickly after presentation. Currently in ICU on vent, paralyzed ,off pressors     Review of Systems: can not be obtained from patient      Social History     Tobacco Use    Smoking status: Former Smoker     Packs/day: 1.50     Years: 30.00     Pack years: 45.00     Types: Cigarettes     Start date:      Quit date:      Years since quittin.9    Smokeless tobacco: Never Used   Vaping Use    Vaping Use: Never used   Substance Use Topics    Alcohol use: Yes     Alcohol/week: 7.0 standard drinks     Types: 7 Cans of beer per week    Drug use: No         Past Medical History:   Diagnosis Date    History of testicular cancer            Past Surgical History:   Procedure Laterality Date    TESTICLE REMOVAL Left     with LN dissection (Seperarate surgery )          No current facility-administered medications on file prior to encounter. No current outpatient medications on file prior to encounter. Allergies   Allergen Reactions    Pcn [Penicillins] Shortness Of Breath and Swelling     Was given as a child for Bee sting: swelling and SoB         Family History   Problem Relation Age of Onset    Heart Attack Mother     Cancer Father         bladder cancer    Cancer Sister         uterine cancer    Cancer Sister         breast cancer         Physical Exam:     Blood pressure (!) 134/54, pulse 106, temperature 99.1 °F (37.3 °C), resp. rate (!) 33, height 5' 8\" (1.727 m), weight 270 lb 11.6 oz (122.8 kg), SpO2 96 %.   General: ventilator  Skin: no new rashes  HEENT:  Neck is supple, No subconjunctival hemorrhages, ET tube  Heart: S1 S2  Lungs: diminished throughout  Abdomen: soft, ND, NTTP, Back :no CVA tenderness  Extrem: No edema, non tender  Neuro exam: sedated  Psych: sedated    Labs: I have reviewed all lab results by electronic record, including most recent CBC, metabolic panel, and pertinent abnormalities were addressed from an infectious disease perspective. Trends are being monitored over time. Lab Results   Component Value Date    WBC 15.8 (H) 12/26/2021    HGB 14.2 12/26/2021    HCT 36.2 (L) 12/26/2021    MCV 85.5 12/26/2021     12/26/2021     Lab Results   Component Value Date     12/26/2021    K 5.3 12/26/2021    K 5.9 12/25/2021    CL 95 12/26/2021    CO2 16 12/26/2021    BUN 90 12/26/2021    CREATININE 6.7 12/26/2021    CREATININE 8.03 12/26/2021    GLUCOSE 139 12/26/2021    CALCIUM 6.5 12/26/2021        Radiology:  I have reviewed imaging results per electronic record and most pertinent abnormalities are being addressed from an infectious disease standpoint. ASSESSMENT:  Acute hypoxic respiratory failure  COVID-19 pneumonia  Acute kidney injury    Patient very ill-appearing.   He is already had multiple complications develop including hypertension kidney injury coffee-ground emesis, etc.      PLAN:  Pt was given actemra  Procalcitonin is up but in the setting of renal failure now,on broad spectrum therapy , send sputum

## 2021-12-26 NOTE — PROGRESS NOTES
Pulmonary & Critical Care Medicine ICU Progress Note  Chief complaint : Acute respiratory failure    Subjunctive/24 hour events :   Patient seen and examined during multidisciplinary rounds with RN, charge nurse, RT, pharmacy, dietitian, and social service. Patient on vent, currently in supine position, he is on Nimbex, fentanyl and propofol, currently not on pressors, heparin drip was discontinued yesterday due to GI bleed, no fever, he is on 70% FiO2 and 14 of PEEP, saturation 98%, urine output 0, vomiting 650 cc, his +6 L.tube feed is on hold due to high residuals, no BM       Social History     Tobacco Use    Smoking status: Former Smoker     Packs/day: 1.50     Years: 30.00     Pack years: 45.00     Types: Cigarettes     Start date:      Quit date:      Years since quittin.    Smokeless tobacco: Never Used   Substance Use Topics    Alcohol use:  Yes     Alcohol/week: 7.0 standard drinks     Types: 7 Cans of beer per week         Problem Relation Age of Onset    Heart Attack Mother     Cancer Father         bladder cancer    Cancer Sister         uterine cancer    Cancer Sister         breast cancer       Recent Labs     21  1636 21  1940   PHART 6.980* 6.999*   [id number] 94* 85*   PO2ART 162* 101*       MV Settings:  Vent Mode: AC/VC Rate Set: 32 bmp/Vt Ordered: 550 mL/ /FiO2 : (S) 70 %           IV:   midazolam 2 mg/hr (21 0444)    cisatracurium (NIMBEX) infusion 0.5 mcg/kg/min (21 1530)    norepinephrine Stopped (21 0804)    dextrose      sodium chloride      fentaNYL (SUBLIMAZE) infusion 200 mcg/hr (21 0506)    propofol 50 mcg/kg/min (21 0607)       Vitals:  BP (!) 134/54   Pulse 105   Temp 98.6 °F (37 °C) (Rectal)   Resp (!) 32   Ht 5' 8\" (1.727 m)   Wt 262 lb 12.6 oz (119.2 kg)   SpO2 98%   BMI 39.96 kg/m²    Tmax:        Intake/Output Summary (Last 24 hours) at 2021 0804  Last data filed at 2021 0538  Gross per 24 hour   Intake 2206 ml   Output 650 ml   Net 1556 ml       EXAM:  General: On vent, sedated, and on Nimbex  Head: normocephalic, atraumatic  Eyes:No gross abnormalities. ENT:  MMM no lesions  Neck:  supple and no masses  Chest : Good air movement, bilateral rales, no wheezing, nontender, tympanic  Heart[de-identified] Heart sounds are normal.  Regular rate and rhythm without murmur, gallop or rub. ABD:  symmetric, soft, non-tender, no guarding or rebound  Musculoskeletal : no cyanosis, no clubbing and trace edema  Neuro:  Unresponsive, sedated  Skin: No rashes or nodules noted.   Lymph node:  no cervical nodes  Urology: Yes [facility]   Psychiatric: unresponsive    Medications:  Scheduled Meds:   metoclopramide  10 mg IntraVENous Q8H    meropenem  500 mg IntraVENous Q24H    vancomycin (VANCOCIN) intermittent dosing (placeholder)   Other RX Placeholder    vancomycin  1,250 mg IntraVENous Once    pantoprazole  40 mg IntraVENous BID    lactulose  20 g Oral TID    sodium zirconium cyclosilicate  10 g Oral Daily    heparin (porcine)  7,500 Units SubCUTAneous 3 times per day    chlorhexidine  15 mL Mouth/Throat BID    sennosides-docusate sodium  2 tablet Oral BID    polyethylene glycol  17 g Oral Daily    insulin lispro  0-6 Units SubCUTAneous 4 times per day    sodium chloride flush  5-40 mL IntraVENous 2 times per day    dexamethasone  6 mg IntraVENous Q24H       PRN Meds:  labetalol, glucose, dextrose, glucagon (rDNA), dextrose, sodium chloride flush, sodium chloride, heparin (porcine), heparin (porcine), hydrALAZINE, sodium chloride, midazolam, iopamidol    Results: reviewed by me   CBC:   Recent Labs     12/24/21  0555 12/24/21  0800 12/25/21  0448 12/25/21  0448 12/25/21  1636 12/25/21  1940 12/26/21  0600   WBC 12.8*  --  14.7*  --   --   --  15.8*   HGB 11.4*   < > 12.5*   < > 13.5 13.1* 12.6*   HCT 34.9*  --  37.6*  --   --   --  36.2*   MCV 86.9  --  87.1  --   --   --  85.5     --  263  --   --   --  248 prophylaxis  · DVT prophylaxis  · Target blood sugar 140-180  ·  change Protonix to twice daily  · reglan TID   · Continue Lokelma  · Dialysis per nephrology  · Lactulose enema  · Check ABG  · Resume tube feed at 10 cc/h          Due to the immediate potential for life-threatening deterioration due to acute respiratory failure I spent 35  minutes providing critical care.  This time is excluding time spent performing procedures.           Electronically signed by Rodolfo Burns MD,  [facility] ,on 12/26/2021 at 8:04 AM

## 2021-12-26 NOTE — PROGRESS NOTES
Nephrology Progress Note    1. SUREKHA: baseline function unknown, 2/2 hemodynamic instability, baseline unknown, nml function years ago however now presented Scr in the 2s, worsening  2. Acute hypoxic/hypercarbic respiratory failure: 2/2 covid-19 PNA, intubated  3. Shock: off pressors   4. Hyperkalemia  5. Metabolic acidosis: 2/2 lactic acidosis and renal failure  6. Hypoalbuminemia  7. Transaminitis      Plan:  - continue IHD TTS        Patient Active Problem List:     Acute respiratory failure with hypoxia (HCC)      Subjective:  Admit Date: 12/22/2021    Interval History: seen on dialysis, tolerating, remains off pressors, remains intubated     Medications:  Scheduled Meds:   metoclopramide  10 mg IntraVENous Q8H    meropenem  500 mg IntraVENous Q24H    vancomycin (VANCOCIN) intermittent dosing (placeholder)   Other RX Placeholder    vancomycin  1,250 mg IntraVENous Once    pantoprazole  40 mg IntraVENous BID    lactulose  20 g Oral TID    sodium zirconium cyclosilicate  10 g Oral Daily    heparin (porcine)  7,500 Units SubCUTAneous 3 times per day    chlorhexidine  15 mL Mouth/Throat BID    sennosides-docusate sodium  2 tablet Oral BID    polyethylene glycol  17 g Oral Daily    insulin lispro  0-6 Units SubCUTAneous 4 times per day    sodium chloride flush  5-40 mL IntraVENous 2 times per day    dexamethasone  6 mg IntraVENous Q24H     Continuous Infusions:   midazolam 2 mg/hr (12/25/21 0444)    cisatracurium (NIMBEX) infusion 0.5 mcg/kg/min (12/25/21 1530)    norepinephrine Stopped (12/24/21 0804)    dextrose      sodium chloride      fentaNYL (SUBLIMAZE) infusion 200 mcg/hr (12/26/21 0506)    propofol 50 mcg/kg/min (12/26/21 0607)       CBC:   Recent Labs     12/25/21 0448 12/25/21  1636 12/26/21  0600 12/26/21  0846   WBC 14.7*  --  15.8*  --    HGB 12.5*   < > 12.6* 14.2     --  248  --     < > = values in this interval not displayed.      CMP:    Recent Labs     12/25/21 0448 12/25/21  0448 12/25/21  0847 12/25/21  1636 12/25/21  1940 12/26/21  0600 12/26/21  0846     --  136  --   --  138  --    K 6.3*  --  5.9*  --   --  5.3*  --    CL 96  --  95  --   --  95  --    CO2 20  --  21  --   --  16*  --    BUN 77*  --  78*  --   --  90*  --    CREATININE 6.20*   < > 6.60*   < > 6.6* 8.03* 6.7*   GLUCOSE 143*  --  183*  --   --  139*  --    CALCIUM 7.2*  --  7.4*  --   --  6.5*  --    LABGLOM 9.2*   < > 8.6*   < > 9* 6.9* 8*    < > = values in this interval not displayed. Troponin:   No results for input(s): TROPONINI in the last 72 hours. BNP: No results for input(s): BNP in the last 72 hours. INR:   Recent Labs     12/23/21  1424   INR 1.1     Lipids: No results for input(s): CHOL, LDLDIRECT, TRIG, HDL, AMYLASE, LIPASE in the last 72 hours. Liver:   Recent Labs     12/26/21  0600   *   *   ALKPHOS 125*   PROT 5.7*   LABALBU 2.7*   BILITOT 0.3     Iron:    Recent Labs     12/24/21  0545   FERRITIN 6,689*     Urinalysis: No results for input(s): UA in the last 72 hours.     Objective:  Vitals: BP (!) 134/54   Pulse 105   Temp 99.1 °F (37.3 °C)   Resp (!) 32   Ht 5' 8\" (1.727 m)   Wt 262 lb 12.6 oz (119.2 kg)   SpO2 98%   BMI 39.96 kg/m²    Wt Readings from Last 3 Encounters:   12/24/21 262 lb 12.6 oz (119.2 kg)   08/21/18 254 lb (115.2 kg)      24HR INTAKE/OUTPUT:      Intake/Output Summary (Last 24 hours) at 12/26/2021 0900  Last data filed at 12/26/2021 1918  Gross per 24 hour   Intake 2206 ml   Output 650 ml   Net 1556 ml        General: intubated, sedated, prone   HEENT: normocephalic, atraumatic, ETT in place  Lungs: intubated, on vent, coarse breath sounds  Heart: regular rate and rhythm, no murmurs or rubs  Abdomen: soft, non-tender, non-distended  Ext: no cyanosis, ++ peripheral edema  Neuro: sedated       Electronically signed by Sil Sam MD, MD

## 2021-12-26 NOTE — PROGRESS NOTES
Patient ID:    Marie Gomez  [id number]  64 y.o.  1960    Assumed Care of Patient. Report Received from RN. Assessment Complete, please see flow sheets. Labs, orders, plan of care and meds reviewed. IV fluids and ventilator settings verified. Patient remains on nimbex. Train of four is 2 twitches on 4 amps. Patient remains oliguric. At most 10 cc urine produced on this 12 hour shift. Urine was dark coffee colored. Patient received oral care per protocol. Patient has minimal secretions present. Unable to produce a sputum sample from this patient at this time. 2200 and 0600 patient's wife, Vasquez [place] called. HIPAA code requested and received. Update given.          Electronically signed by Delilah Velasco RN

## 2021-12-27 NOTE — PLAN OF CARE
Nutrition Problem #1: Inadequate oral intake  Intervention: Food and/or Nutrient Delivery: Modify Tube Feeding  Nutritional Goals:  Tolerate EN at goal.

## 2021-12-27 NOTE — PROGRESS NOTES
Meeting resistance when suctioning pt. Advanced ett to 25 . Still meeting resitance. ett pulled back to 24.

## 2021-12-27 NOTE — PROGRESS NOTES
PHARMACY NOTE:      ICU Day #5     Changes made today by Pharmacy:   NOTE: no formal interdisciplinary rounds today        Additional information:   IHD Tues, Thurs, Sat  Steroid: day 5/10: dexamethasone 6mg IV daily   Insulin coverage: medium dose sliding scale sliding scale with Humalog, utilized 5 units per sliding scale over the past 24 hours -  today - monitor/adjust on rounds 12/28/21  Pressors: norepinephrine ordered, never hung   Sedation: fentanyl @ 200mcg/hr + propofol @ 50mcg/kg/min + versed@ 2mg/hr   Also on Nimbex @ 0.5mcg/kg/min   Antimicrobial therapy, day #3: vancomycin, pharmacy to dose, level PRE IHD 12/28/21; meropenem 500mg IV Q24 hours (renally adjusted dosing for hemodialysis). Infectious Disease Services following patient, repeat cultures are pending at this time.     Core measures assessed/met    Marisabel OlsonD, BCPS   12/27/2021 10:32 AM

## 2021-12-27 NOTE — PROGRESS NOTES
Nephrology Progress Note    1. SUREKHA: baseline function unknown, 2/2 hemodynamic instability, baseline unknown, nml function years ago however now presented Scr in the 2s, worsening  2. Acute hypoxic/hypercarbic respiratory failure: 2/2 covid-19 PNA, intubated  3. Shock: off pressors   4. Hyperkalemia: corrected medically   5. Metabolic acidosis: 2/2 lactic acidosis and renal failure  6. Hypoalbuminemia  7. Transaminitis      Plan:  - continue IHD TTS   - may need cath adjustment, no urgency, ok to do so tomorrow        Patient Active Problem List:     Acute respiratory failure with hypoxia (HCC)      Subjective:  Admit Date: 12/22/2021    Interval History: got dialysis yesterday, still having poor catheter flows, remains intubated     Medications:  Scheduled Meds:   insulin lispro  0-12 Units SubCUTAneous Q6H    metoclopramide  10 mg IntraVENous Q8H    meropenem  500 mg IntraVENous Q24H    vancomycin (VANCOCIN) intermittent dosing (placeholder)   Other RX Placeholder    pantoprazole  40 mg IntraVENous BID    heparin (porcine)  7,500 Units SubCUTAneous 3 times per day    chlorhexidine  15 mL Mouth/Throat BID    sennosides-docusate sodium  2 tablet Oral BID    polyethylene glycol  17 g Oral Daily    sodium chloride flush  5-40 mL IntraVENous 2 times per day    dexamethasone  6 mg IntraVENous Q24H     Continuous Infusions:   midazolam 2 mg/hr (12/26/21 2137)    cisatracurium (NIMBEX) infusion 0.5 mcg/kg/min (12/27/21 0759)    norepinephrine Stopped (12/27/21 0925)    dextrose      sodium chloride      fentaNYL (SUBLIMAZE) infusion 200 mcg/hr (12/27/21 0808)    propofol 50 mcg/kg/min (12/27/21 0821)       CBC:   Recent Labs     12/26/21  0600 12/26/21  0846 12/27/21  0552 12/27/21  0754   WBC 15.8*  --  16.8*  --    HGB 12.6*   < > 12.4* 11.4*     --  259  --     < > = values in this interval not displayed.      CMP:    Recent Labs     12/25/21  0847 12/25/21  1636 12/26/21  0600 12/26/21  0846 12/27/21  0048 12/27/21  0553 12/27/21  0754     --  138  --   --  133*  --    K 5.9*  --  5.3*  --   --  4.7  --    CL 95  --  95  --   --  92*  --    CO2 21  --  16*  --   --  17*  --    BUN 78*  --  90*  --   --  95*  --    CREATININE 6.60*   < > 8.03*   < > 9.6* 8.24* 10.8*   GLUCOSE 183*  --  139*  --   --  227*  --    CALCIUM 7.4*  --  6.5*  --   --  6.6*  --    LABGLOM 8.6*   < > 6.9*   < > 6* 6.7* 5*    < > = values in this interval not displayed. Troponin:   No results for input(s): TROPONINI in the last 72 hours. BNP: No results for input(s): BNP in the last 72 hours. INR:   No results for input(s): INR in the last 72 hours. Lipids: No results for input(s): CHOL, LDLDIRECT, TRIG, HDL, AMYLASE, LIPASE in the last 72 hours. Liver:   Recent Labs     12/27/21  0553   AST 56*   ALT 71*   ALKPHOS 120*   PROT 5.5*   LABALBU 2.6*   BILITOT 0.3     Iron:    No results for input(s): IRONS, FERRITIN in the last 72 hours. Invalid input(s): LABIRONS  Urinalysis: No results for input(s): UA in the last 72 hours.     Objective:  Vitals: BP (!) 137/49   Pulse 81   Temp 97.6 °F (36.4 °C) (Oral)   Resp (!) 32   Ht 5' 8\" (1.727 m)   Wt 270 lb 11.6 oz (122.8 kg)   SpO2 95%   BMI 41.16 kg/m²    Wt Readings from Last 3 Encounters:   12/26/21 270 lb 11.6 oz (122.8 kg)   08/21/18 254 lb (115.2 kg)      24HR INTAKE/OUTPUT:      Intake/Output Summary (Last 24 hours) at 12/27/2021 5471  Last data filed at 12/27/2021 0559  Gross per 24 hour   Intake 2762 ml   Output 1840 ml   Net 922 ml        General: intubated, sedated, prone   HEENT: normocephalic, atraumatic, ETT in place  Lungs: intubated, on vent, coarse breath sounds  Heart: regular rate and rhythm, no murmurs or rubs  Abdomen: soft, non-tender, non-distended  Ext: no cyanosis, ++ peripheral edema  Neuro: sedated       Electronically signed by Jared Martínez MD, MD

## 2021-12-27 NOTE — PROGRESS NOTES
Pulmonary & Critical Care Medicine ICU Progress Note  Chief complaint : Acute respiratory failure    Subjunctive/24 hour events :   Patient seen and examined during multidisciplinary rounds with RN, charge nurse, RT, pharmacy, dietitian, and social service. On vent,Sedated, and unresponsive, he is currently on Levophed at 6 mcg/min, on fentanyl and propofol, and Versed, on 90% FiO2 with 10 of PEEP, saturation 93%, plateau pressure is 30, temperature 99 °F.  Urine output 40 cc, dialysis output 1800 cc, +7 L. On Nimbex at 0.7 mcg/kg/min, positive bowel movement      Social History     Tobacco Use    Smoking status: Former Smoker     Packs/day: 1.50     Years: 30.00     Pack years: 45.00     Types: Cigarettes     Start date:      Quit date:      Years since quittin.9    Smokeless tobacco: Never Used   Substance Use Topics    Alcohol use:  Yes     Alcohol/week: 7.0 standard drinks     Types: 7 Cans of beer per week         Problem Relation Age of Onset    Heart Attack Mother     Cancer Father         bladder cancer    Cancer Sister         uterine cancer    Cancer Sister         breast cancer       Recent Labs     21  1551 21  0048   PHART 7.131* 7.229*   [id number] 71* 53*   PO2ART 131* 126*       MV Settings:  Vent Mode: AC/VC Rate Set: 32 bmp/Vt Ordered: 550 mL/ /FiO2 : 90 %           IV:   midazolam 2 mg/hr (21)    cisatracurium (NIMBEX) infusion 0.7 mcg/kg/min (21)    norepinephrine 6 mcg/min (2122)    dextrose      sodium chloride      fentaNYL (SUBLIMAZE) infusion 200 mcg/hr (21 0230)    propofol 50 mcg/kg/min (21 0521)       Vitals:  BP (!) 95/51   Pulse 83   Temp 99 °F (37.2 °C) (Rectal)   Resp (!) 32   Ht 5' 8\" (1.727 m)   Wt 270 lb 11.6 oz (122.8 kg)   SpO2 93%   BMI 41.16 kg/m²    Tmax:        Intake/Output Summary (Last 24 hours) at 2021 0747  Last data filed at 2021 0559  Gross per 24 hour   Intake 2762 ml Output 1840 ml   Net 922 ml       EXAM:  General: On vent, sedated, unresponsive  Head: normocephalic, atraumatic  Eyes:No gross abnormalities. ENT:  MMM no lesions, ET and OG tube in  Neck:  supple and no masses  Chest : Good air movement, bilateral rales, no wheezing, no apparent tenderness, no subcutaneous air  Heart[de-identified] Heart sounds are normal.  Regular rate and rhythm without murmur, gallop or rub. ABD:  symmetric, soft, non-tender, no guarding or rebound  Musculoskeletal : no cyanosis, no clubbing and trace edema  Neuro:  Unresponsive, sedated  Skin: No rashes or nodules noted.   Lymph node:  no cervical nodes  Urology: Yes [facility]   Psychiatric: unresponsive    Medications:  Scheduled Meds:   metoclopramide  10 mg IntraVENous Q8H    meropenem  500 mg IntraVENous Q24H    vancomycin (VANCOCIN) intermittent dosing (placeholder)   Other RX Placeholder    pantoprazole  40 mg IntraVENous BID    lactulose  20 g Oral TID    sodium zirconium cyclosilicate  10 g Oral Daily    heparin (porcine)  7,500 Units SubCUTAneous 3 times per day    chlorhexidine  15 mL Mouth/Throat BID    sennosides-docusate sodium  2 tablet Oral BID    polyethylene glycol  17 g Oral Daily    insulin lispro  0-6 Units SubCUTAneous 4 times per day    sodium chloride flush  5-40 mL IntraVENous 2 times per day    dexamethasone  6 mg IntraVENous Q24H       PRN Meds:  labetalol, glucose, dextrose, glucagon (rDNA), dextrose, sodium chloride flush, sodium chloride, heparin (porcine), heparin (porcine), hydrALAZINE, sodium chloride, midazolam, iopamidol    Results: reviewed by me   CBC:   Recent Labs     12/25/21  0448 12/25/21  1636 12/26/21  0600 12/26/21  0846 12/26/21  1551 12/27/21  0048 12/27/21  0552   WBC 14.7*  --  15.8*  --   --   --  16.8*   HGB 12.5*   < > 12.6*   < > 13.2* 11.6* 12.4*   HCT 37.6*  --  36.2*  --   --   --  33.7*   MCV 87.1  --  85.5  --   --   --  84.3     --  248  --   --   --  259    < > = values in this interval not displayed. BMP:   Recent Labs     12/25/21  0448 12/25/21  0448 12/25/21  0847 12/25/21  1636 12/25/21  1940 12/26/21  0600 12/26/21  0846 12/26/21  1551 12/27/21  0048     --  136  --   --  138  --   --   --    K 6.3*  --  5.9*  --   --  5.3*  --   --   --    CL 96  --  95  --   --  95  --   --   --    CO2 20  --  21  --   --  16*  --   --   --    BUN 77*  --  78*  --   --  90*  --   --   --    CREATININE 6.20*   < > 6.60*   < >   < > 8.03* 6.7* 8.8* 9.6*    < > = values in this interval not displayed. LIVER PROFILE:   Recent Labs     12/25/21  0448 12/26/21  0600 12/27/21  0553   * 157* 56*   * 105* 71*   BILITOT 0.5 0.3  --    ALKPHOS 115* 125*  --      PT/INR:   No results for input(s): PROTIME, INR in the last 72 hours. APTT:   No results for input(s): APTT in the last 72 hours. UA:No results for input(s): NITRITE, COLORU, PHUR, LABCAST, WBCUA, RBCUA, MUCUS, TRICHOMONAS, YEAST, BACTERIA, CLARITYU, SPECGRAV, LEUKOCYTESUR, UROBILINOGEN, BILIRUBINUR, BLOODU, GLUCOSEU, AMORPHOUS in the last 72 hours. Invalid input(s): Jessica Lock    Cultures:  Negative so far  Films:  CXR reviewed by me and it showed bilateral groundglass infiltrate      Assessment:   This is a critically ill patient at risk of deterioration / death , needing close ICU monitoring and intervention due to below noted problems   · Acute hypoxic and hypercapnic respiratory failure  · Severe COVID-19 pneumonia  · Septic shock, back on Levophed  · Combined metabolic and respiratory acidosis  · Acute kidney injury, on dialysis  · Abnormal LFT      Recommendation  · Vent support lung protective strategy  · head of the bed 30°  · Wean off Nimbex as tolerated  · Sedation with combination propofol and fentanyl target R ASS of 0 to -1, requiring Versed for adequate sedation  · Watch for ICU delirium: TV on, natural light, avoid benzos, pain control, early mobility, and family engagement  · PUD prophylaxis  · DVT prophylaxis  · Target blood sugar 140-180  · Dexamethasone 6 mg daily for 10 days  · Received Actemra  · Did not receive remdesivir due to abnormal LFT and renal failure  · Levophed to maintain mean arterial pressure 65-70  · Change to medium sliding scale  · Continue Protonix twice daily  · Continue Reglan  · Dialysis per nephrology  · Continue tube feed  · Continue senna S, DC lactulose          Due to the immediate potential for life-threatening deterioration due to acute respiratory failure I spent 35  minutes providing critical care.  This time is excluding time spent performing procedures.           Electronically signed by Nicole Cedeno MD,  [facility] ,on 12/27/2021 at 7:47 AM

## 2021-12-27 NOTE — PROGRESS NOTES
Patient ID:    Dalila Angulo  [id number]  64 y.o.  1960    Assumed Care of Patient. Report Received from RN. Assessment Complete, please see flow sheets. Labs, orders, plan of care and meds reviewed. IV fluids and ventilator settings verified. 0000 levophed titrated down to 10 mcg/min.    0200 levophed titrated to 8 mcg/min for desired effect. 0220 patient received lactulose enema. Due to extensive hemorrhoids both internal and external, it was difficult to instill the enema. Enema infused over 15 minutes. 0330 liquid brown stool present.         Electronically signed by Grey Siu RN

## 2021-12-27 NOTE — PROGRESS NOTES
Physician Progress Note      PATIENT:               Freeman Martinez  CSN #:                  [id number]  :                       1960  ADMIT DATE:       2021 4:11 PM  [address] [place] DATE:  RESPONDING  PROVIDER #:        Jack Tee MD          QUERY TEXT:    Patient admitted with COVID 19 pneumonia, acute respiratory failure, SUREKHA . Per   Dr David Handy  progress note \"septic shock\",  wbc 1.7  12.0;  lactic acid 10.9   temp 99   pulse 115-143, RR 28-33 , b/p 40/22 creatinine 2.09->3.14;  please   document in progress notes and discharge summary the present on admission   status of sepsis. The medical record reflects the following:  Risk Factors: + COVID 19 pneumonia acute respiratory failure SUREKHA septic shock  Clinical Indicators:  lactic acid 10.9  WBC 10.7 12.0  10.5;  covid19 detected     d dimer 3.07  8.51    procalcitonin 0.51; pulse 115-143, RR 28-33 ,   creatinine 2.09->3.14 cxr: There is severe increased pulmonary markings   throughout both lungs worrisome for fluid overload versus viral infiltrates,   pneumonia.  per Dr David Handy consult 21:  Chucho Rodgers  COVID 19 pneumonia    septic shock\"  Treatment: ICU admit  intubation  levophed   consult nephrology   per   intensivist not candidate for remdesivir due to abnormal LFT, same applies to   baricitinib and Actemra    Thank you,  Jennifer Patel BSN RN CDS   M-F 6am-2pm  Options provided:  -- Yes, sepsis was present at the time of the order to admit to the hospital  -- Other - I will add my own diagnosis  -- Disagree - Not applicable / Not valid  -- Disagree - Clinically unable to determine / Unknown  -- Refer to Clinical Documentation Reviewer    PROVIDER RESPONSE TEXT:    Yes, sepsis was present at the time of the order to admit to the hospital.    Query created by: Kathyleen Lombard on 2021 1:38 PM      Electronically signed by:  Jack Tee MD 2021 5:53 PM

## 2021-12-27 NOTE — PROGRESS NOTES
Hospitalist Progress Note      PCP: Adrienne Dorantes MD    Date of Admission: 12/22/2021    Chief Complaint:  Patient remains intubated, on mechanical ventilation, sedated with fentanyl, propofol and midazolam, paralyzed with Nimbex, no fevers, off Levophed    Medications:  Reviewed    Infusion Medications    midazolam 2 mg/hr (12/26/21 2137)    cisatracurium (NIMBEX) infusion 0.5 mcg/kg/min (12/27/21 3524)    norepinephrine Stopped (12/27/21 0927)    dextrose      sodium chloride      fentaNYL (SUBLIMAZE) infusion 200 mcg/hr (12/27/21 2970)    propofol 50 mcg/kg/min (12/27/21 6121)     Scheduled Medications    insulin lispro  0-12 Units SubCUTAneous Q6H    metoclopramide  10 mg IntraVENous Q8H    meropenem  500 mg IntraVENous Q24H    vancomycin (VANCOCIN) intermittent dosing (placeholder)   Other RX Placeholder    pantoprazole  40 mg IntraVENous BID    heparin (porcine)  7,500 Units SubCUTAneous 3 times per day    chlorhexidine  15 mL Mouth/Throat BID    sennosides-docusate sodium  2 tablet Oral BID    polyethylene glycol  17 g Oral Daily    sodium chloride flush  5-40 mL IntraVENous 2 times per day    dexamethasone  6 mg IntraVENous Q24H     PRN Meds: labetalol, glucose, dextrose, glucagon (rDNA), dextrose, sodium chloride flush, sodium chloride, heparin (porcine), heparin (porcine), hydrALAZINE, sodium chloride, midazolam, iopamidol      Intake/Output Summary (Last 24 hours) at 12/27/2021 0942  Last data filed at 12/27/2021 0559  Gross per 24 hour   Intake 2762 ml   Output 1840 ml   Net 922 ml       Exam:    BP (!) 137/49   Pulse 81   Temp 97.6 °F (36.4 °C) (Oral)   Resp (!) 32   Ht 5' 8\" (1.727 m)   Wt 270 lb 11.6 oz (122.8 kg)   SpO2 95%   BMI 41.16 kg/m²     General appearance: intubated, sedated  Respiratory:  diminished bilaterally   Cardiovascular: Regular rate and rhythm, S1/S2 . Abdomen: Soft, active bowel sounds. Musculoskeletal: No edema bilaterally.      Labs:   Recent Labs 12/25/21  0448 12/25/21  1636 12/26/21  0600 12/26/21  0846 12/27/21  0048 12/27/21  0552 12/27/21  0754   WBC 14.7*  --  15.8*  --   --  16.8*  --    HGB 12.5*   < > 12.6*   < > 11.6* 12.4* 11.4*   HCT 37.6*  --  36.2*  --   --  33.7*  --      --  248  --   --  259  --     < > = values in this interval not displayed. Recent Labs     12/25/21  0847 12/25/21  1636 12/26/21  0600 12/26/21  0846 12/27/21  0048 12/27/21  0553 12/27/21  0754     --  138  --   --  133*  --    K 5.9*  --  5.3*  --   --  4.7  --    CL 95  --  95  --   --  92*  --    CO2 21  --  16*  --   --  17*  --    BUN 78*  --  90*  --   --  95*  --    CREATININE 6.60*   < > 8.03*   < > 9.6* 8.24* 10.8*   CALCIUM 7.4*  --  6.5*  --   --  6.6*  --     < > = values in this interval not displayed. Recent Labs     12/25/21  0448 12/26/21  0600 12/27/21  0553   * 157* 56*   * 105* 71*   BILITOT 0.5 0.3 0.3   ALKPHOS 115* 125* 120*     No results for input(s): INR in the last 72 hours. No results for input(s): Hayes Min in the last 72 hours. Urinalysis:    No results found for: Heddy Iliff, BACTERIA, RBCUA, BLOODU, [address], [address] [place] [address]    Radiology:  XR CHEST PORTABLE   Final Result      Stable exam since yesterday. XR CHEST (SINGLE VIEW FRONTAL)   Final Result      There are bilateral alveolar opacities , infiltrates worrisome for viral COVID-19 pneumonia. IR PICC WO SQ PORT/PUMP > 5 YEARS   Final Result      CT HEAD WO CONTRAST   Final Result   Impression:      Pansinusitis. All CT scans at this facility use dose modulation, iterative reconstruction, and/or weight based dosing when appropriate to reduce radiation dose to as low as reasonably achievable. XR CHEST PORTABLE   Final Result      LEFT INTERNAL JUGULAR APPROACH CENTRAL VENOUS CATHETER PLACED IN EXPECTED POSITION. OTHERWISE, STABLE CHEST FROM YESTERDAY.                XR CHEST ABDOMEN NG PLACEMENT   Final Result ENDOTRACHEAL AND OROGASTRIC TUBES IN EXPECTED POSITIONS. US RETROPERITONEAL LIMITED   Final Result      ESSENTIALLY NEGATIVE LIMITED RENAL ULTRASOUND. XR CHEST PORTABLE   Final Result      XR CHEST PORTABLE   Final Result   There is severe increased pulmonary markings throughout both lungs worrisome for fluid overload versus viral infiltrates, pneumonia.                     Assessment/Plan:    63 y/o man with history of morbid obesity who presented with:    Acute hypoxic respiratory failure   - due to COVID pneumonia  - requiring intubation and mechanical ventilation  - on Decadron  - no Remdesivir or Baricitinib due to elevated LTFs and SUREKHA  - s/p Actemra   - started on broad spectrum antibiotics empirically  - blood cultures no growth  - management per critical care and ID service    Shock  - off norepinephrine infusion  - management per critical care    SUREKHA / hyperkalemia / AG metabolic acidosis  - started on dialysis   - management per nephrology    Elevated LFTs  - follow trend     Diet: Diet NPO  ADULT TUBE FEEDING; Orogastric; Renal Formula; Continuous; 10; No; 50; Q 6 hours    Code Status: Full Code        Electronically signed by Gibson Fatima MD on 12/27/2021 at 9:42 AM

## 2021-12-27 NOTE — PROGRESS NOTES
Comprehensive Nutrition Assessment    Type and Reason for Visit:  Reassess    Nutrition Recommendations/Plan:   Change TF to Semi-elemental formula = Peptide based ( Vital 1.2) @ 10 ml/hr due to intolerance  This will delvier < 400 ml K  Add 2 protein modulars per day  Continue with 50 ml water flush , 4 x daily  Monitor for changes in propofol rate/proning schedule for adjustments to EN goal rate  If unable to tolerate trophic rate TF will need PN    Nutrition Assessment:  Pt with no progress towards nutrition goals, has not tolerated trophic rate TF. Resduals > 300, Formual changed to Renal TF by nephrology and decreased to 10 ml.hr, renal TF is much higher in fat with a hihger osmolality which can slow GI transit time. In  addition, pt remains on a hihg rate of propofol, providing a high percentage of calories from fat. Is already receing IV reglan and bowel regimen. Will trial semi-elemental TF, which @ 10 ml/hr will provide ~ 400 mg K daily. Today is day 5 of EN not at goal,. May need to consider PN if unable to acheive EN tolerance    Malnutrition Assessment:  Malnutrition Status: At risk for malnutrition (Comment)    Context:  Acute Illness     Findings of the 6 clinical characteristics of malnutrition:  Energy Intake:  7 - 50% or less of estimated energy requirements for 5 or more days  Weight Loss:  Unable to assess     Body Fat Loss:  Unable to assess     Muscle Mass Loss:  Unable to assess    Fluid Accumulation:  1 - Mild Generalized   Strength:  Not Performed    Estimated Daily Nutrient Needs:  Energy (kcal):  1024-3260 kcal (11-13 kcal/kg); Weight Used for Energy Requirements:  Current (119 kg)     Protein (g):  126-140 kcal (1.8-2 g/kg);  Weight Used for Protein Requirements:  Ideal (70 kg IBW)        Fluid (ml/day):  ~1400 ml or per MD; Method Used for Fluid Requirements:  1 ml/kcal      Nutrition Related Findings:  intubated 12/22, trophic TF started, unable to tolerated trophic rate, + COVID, now on dialysis, last treatment 12/26, oliguric, lquid stools after lactulose enema, continues on IV reglan, + propofol @24.6 ( ~ 900 kcals), 1+ generalized edema, glucose 139-222, K 4.7-5.3, BUN > 90, Creat > 8      Wounds:  None       Current Nutrition Therapies:    Current Tube Feeding (TF) Orders:  · Feeding Route: Orogastric  · Formula: Peptide Based  · Schedule: Continuous @ 10 ml/hr  · Additives/Modulars: Protein (x2 = 208 kcals, 52 g protein)  · Water Flushes: 50 x 4 ( 200)  · Current TF & Flush Orders Provides: 496 kcals + propofol kcals ,70 g proteinm ~ 400 ml free water  · Goal TF & Flush Orders Provides: 496 kcals + propofol kcals ,70 g proteinm ~ 400 ml free water    Anthropometric Measures:  · Height: 5' 8\" (172.7 cm)  · Current Body Weight: 270 lb (122.5 kg) (12/26 * edema present)   · Admission Body Weight: 254 lb (115.2 kg) (estimated)    · Usual Body Weight:  (unknown)     · Ideal Body Weight: 154 lbs;    · BMI: 41.1  · BMI Categories: Obese Class 2 (BMI 35.0 -39.9)       Nutrition Diagnosis:   · Inadequate oral intake related to impaired respiratory function (d/t COVID) as evidenced by NPO or clear liquid status due to medical condition    · Altered nutrition-related lab values related to renal dysfunction as evidenced by lab values    Nutrition Interventions:   Food and/or Nutrient Delivery:  Modify Tube Feeding  Nutrition Education/Counseling:  No recommendation at this time   Coordination of Nutrition Care:  Continue to monitor while inpatient    Goals: Tolerate EN at goal.       Nutrition Monitoring and Evaluation:   Behavioral-Environmental Outcomes:  None Identified   Food/Nutrient Intake Outcomes:  Enteral Nutrition Intake/Tolerance  Physical Signs/Symptoms Outcomes:  Biochemical Data,Meal Time Behavior,Weight,Fluid Status or Edema     Discharge Planning:     Too soon to determine     Electronically signed by Henrietta Mcclendon RD, LD on 12/27/21 at 12:54 PM EST

## 2021-12-28 NOTE — PROCEDURES
PROCEDURE:   Endotracheal intubation. INDICATIONS:   Acute Respiratory Failure. Consent   The patient was counseled regarding the procedure, it's indications, risks, potential complications and alternatives and any questions were answered. Consent was obtained. PROCEDURE SUMMARY:   Bougie was placed over the existing ET tube, existing tube removed and a 7.5 Vietnamese endotracheal tube was placed over the Bougie. The Bougie  was removed. Breath sounds were heard in both lung fields equally. The endotracheal tube was placed at 27 cm, measured at the teeth. COMPLICATIONS:   None     ESTIMATED BLOOD LOSS:   0      CIERA Joyce CNP 4:39 PM 12/28/2021 .

## 2021-12-28 NOTE — PROGRESS NOTES
Infectious Diseases Inpatient Progress Note          HISTORY OF PRESENT ILLNESS:  Follow up critical COVID-19 with septic shock, severe hypoxia requiring intubation, currently in ICU, paralyzed, ventilated on assist control 90%, has bilateral lower extremity deep vein thrombosis. No hypotension or pressors. Tolerating tube feeding. Positive liquid stools. on IV Vanco and meropenem, well tolerated  Acute kidney injury, worsening  Negative blood and urine cultures  Highly elevated D-dimer  Persistent leukocytosis and anemia  Current Medications:     vancomycin  1,500 mg IntraVENous Once    insulin lispro  0-12 Units SubCUTAneous Q6H    metoclopramide  10 mg IntraVENous Q8H    meropenem  500 mg IntraVENous Q24H    vancomycin (VANCOCIN) intermittent dosing (placeholder)   Other RX Placeholder    pantoprazole  40 mg IntraVENous BID    heparin (porcine)  7,500 Units SubCUTAneous 3 times per day    chlorhexidine  15 mL Mouth/Throat BID    sennosides-docusate sodium  2 tablet Oral BID    polyethylene glycol  17 g Oral Daily    sodium chloride flush  5-40 mL IntraVENous 2 times per day    dexamethasone  6 mg IntraVENous Q24H       Allergies:  Pcn [penicillins]      Review of Systems  ROS  unable to provide ROS because of sedation      Physical Exam  Vitals:    02/27/17 1800 02/27/17 1830 02/27/17 1855 02/27/17 1900   BP: 118/78 118/78  102/69   Pulse: 77 80 81 80   Resp: 14 14 14 (!) 0   Temp:       TempSrc:       SpO2: 100% 100% 100% 100%   Weight:       Height:         General Appearance: sedated, non responsive to verbals  Skin: warm and dry, no rash.    Head: normocephalic and atraumatic  Eyes:  anicteric sclerae and skin  Intact central lines  Lungs: Bilateral scattered rales  Heart: nl S1/S2, no murmur  Abdomen: soft, no distention or rigidity  NEUROLOGICAL: Does not follow any commands  Bilateral leg swelling   [place] with low urine output, kristine        DATA:    Lab Results   Component Value Date    WBC 18. 2 (H) 12/28/2021    HGB 11.3 (L) 12/28/2021    HCT 31.9 (L) 12/28/2021    MCV 84.9 12/28/2021     12/28/2021     Lab Results   Component Value Date    CREATININE 13.6 (HH) 12/28/2021     (HH) 12/28/2021     (L) 12/28/2021    K 5.5 (H) 12/28/2021    CL 91 (L) 12/28/2021    CO2 17 (L) 12/28/2021       Hepatic Function Panel:  Lab Results   Component Value Date    ALKPHOS 127 12/28/2021    ALT 56 12/28/2021    AST <5 12/28/2021    PROT 5.6 12/28/2021    BILITOT 0.3 12/28/2021    LABALBU 2.7 12/28/2021       Microbiology:   No results for input(s): BC in the last 72 hours. No results for input(s): Breanna Lisseth in the last 72 hours. Recent Labs     12/26/21 1913   LABURIN No growth in 24 hours     No results for input(s): WNDABS in the last 72 hours. No results for input(s): CULTRESP in the last 72 hours. No results for input(s): PH, PO2, PCO2, HCO3, BE, O2SAT in the last 72 hours. XR CHEST (SINGLE VIEW FRONTAL)    Result Date: 12/24/2021  There are bilateral alveolar opacities , infiltrates worrisome for viral COVID-19 pneumonia. CT HEAD WO CONTRAST    Result Date: 12/23/2021  Impression: Pansinusitis. All CT scans at this facility use dose modulation, iterative reconstruction, and/or weight based dosing when appropriate to reduce radiation dose to as low as reasonably achievable. XR CHEST PORTABLE    Result Date: 12/25/2021  Stable exam since yesterday. XR CHEST PORTABLE    Result Date: 12/23/2021  LEFT INTERNAL JUGULAR APPROACH CENTRAL VENOUS CATHETER PLACED IN EXPECTED POSITION. OTHERWISE, STABLE CHEST FROM YESTERDAY. XR CHEST PORTABLE    Result Date: 12/22/2021  Diffuse bilateral airspace opacities may represent pulmonary edema or pneumonia. XR CHEST PORTABLE    Result Date: 12/22/2021  There is severe increased pulmonary markings throughout both lungs worrisome for fluid overload versus viral infiltrates, pneumonia.      XR CHEST ABDOMEN NG PLACEMENT    Result Date: 12/23/2021  ENDOTRACHEAL AND OROGASTRIC TUBES IN EXPECTED POSITIONS. US RETROPERITONEAL LIMITED    Result Date: 12/23/2021  ESSENTIALLY NEGATIVE LIMITED RENAL ULTRASOUND.      US DUP LOWER EXTREMITIES BILATERAL VENOUS    Result Date: 12/28/2021  POSITIVE FOR THROMBUS WITHIN THE RIGHT POSTERIOR TIBIAL VEIN AND LEFT POSTERIOR TIBIAL VEIN        IMPRESSION:    · Septic shock  · critical COVID 19 pneumonia  · Acute respiratory failure with hypoxia  · Acute kidney injury  · Hypercoagulable state with bilateral lower extremity acute DVT  ·     Patient Active Problem List   Diagnosis    Acute respiratory failure with hypoxia (HCC)    Pneumonia due to COVID-19 virus    Acute kidney injury ([facility] [address]       PLAN:  · DC vancomycin since negative MRSA  · Continue IV meropenem for now  · Vent support and weaning as tolerated  · Follow-up CBC BMP and chest x-ray  · Follow-up blood cultures  · SQ Heparin and Decadron as ordered  Patient has a guarded prognosis  35 minutes were spent reviewing chart and implementing care and examining patient  Discussed with otherRN    Jordon Anthony MD

## 2021-12-28 NOTE — PROGRESS NOTES
Nephrology Progress Note    1. SUREKHA: baseline function unknown, 2/2 hemodynamic instability, baseline unknown, nml function years ago however now presented Scr in the 2s, started on HD 12/24   2. Acute hypoxic/hypercarbic respiratory failure: 2/2 covid-19 PNA, intubated  3. Shock: off pressors   4. Hyperkalemia: corrected medically   5. Metabolic acidosis: 2/2 lactic acidosis and renal failure  6. Hypoalbuminemia  7. Transaminitis      Plan:  - continue IHD TTS   - cath to be adjusted vs. Exchanged today       Patient Active Problem List:     Acute respiratory failure with hypoxia (HCC)      Subjective:  Admit Date: 12/22/2021    Interval History: off levo since yesterday, remains intubated and paralyzed     Medications:  Scheduled Meds:   insulin lispro  0-12 Units SubCUTAneous Q6H    metoclopramide  10 mg IntraVENous Q8H    meropenem  500 mg IntraVENous Q24H    vancomycin (VANCOCIN) intermittent dosing (placeholder)   Other RX Placeholder    pantoprazole  40 mg IntraVENous BID    heparin (porcine)  7,500 Units SubCUTAneous 3 times per day    chlorhexidine  15 mL Mouth/Throat BID    sennosides-docusate sodium  2 tablet Oral BID    polyethylene glycol  17 g Oral Daily    sodium chloride flush  5-40 mL IntraVENous 2 times per day    dexamethasone  6 mg IntraVENous Q24H     Continuous Infusions:   midazolam 2 mg/hr (12/28/21 0600)    cisatracurium (NIMBEX) infusion 0.6 mcg/kg/min (12/28/21 0700)    norepinephrine Stopped (12/27/21 0925)    dextrose      sodium chloride 25 mL (12/27/21 1144)    fentaNYL (SUBLIMAZE) infusion 175 mcg/hr (12/28/21 0400)    propofol 40 mcg/kg/min (12/28/21 0749)       CBC:   Recent Labs     12/27/21  0552 12/27/21  0754 12/27/21  2350 12/28/21  0537   WBC 16.8*  --   --  18.2*   HGB 12.4*   < > 11.0* 11.3*     --   --  229    < > = values in this interval not displayed.      CMP:    Recent Labs     12/25/21  0847 12/25/21  1636 12/26/21  0600 12/26/21  0846 12/27/21  0553 12/27/21  0553 12/27/21  0754 12/27/21  1633 12/27/21  2350     --  138  --  133*  --   --   --   --    K 5.9*  --  5.3*  --  4.7  --   --   --   --    CL 95  --  95  --  92*  --   --   --   --    CO2 21  --  16*  --  17*  --   --   --   --    BUN 78*  --  90*  --  95*  --   --   --   --    CREATININE 6.60*   < > 8.03*   < > 8.24*  --  10.8* 9.9* 11.8*   GLUCOSE 183*  --  139*  --  227*  --   --   --   --    CALCIUM 7.4*  --  6.5*  --  6.6*  --   --   --   --    LABGLOM 8.6*   < > 6.9*   < > 6.7*   < > 5* 5* 4*    < > = values in this interval not displayed. Troponin:   No results for input(s): TROPONINI in the last 72 hours. BNP: No results for input(s): BNP in the last 72 hours. INR:   No results for input(s): INR in the last 72 hours. Lipids: No results for input(s): CHOL, LDLDIRECT, TRIG, HDL, AMYLASE, LIPASE in the last 72 hours. Liver:   Recent Labs     12/27/21  0553 12/27/21  0553 12/28/21  0537   AST 56*   < > <5   ALT 71*   < > 56*   ALKPHOS 120*  --   --    PROT 5.5*  --   --    LABALBU 2.6*  --   --    BILITOT 0.3  --   --     < > = values in this interval not displayed. Iron:    Recent Labs     12/27/21  0616   FERRITIN 2,454*     Urinalysis: No results for input(s): UA in the last 72 hours.     Objective:  Vitals: BP (!) 137/49   Pulse 92   Temp 98.2 °F (36.8 °C) (Oral)   Resp (!) 32   Ht 5' 8\" (1.727 m)   Wt 272 lb 4.3 oz (123.5 kg)   SpO2 (!) 89%   BMI 41.40 kg/m²    Wt Readings from Last 3 Encounters:   12/28/21 272 lb 4.3 oz (123.5 kg)   08/21/18 254 lb (115.2 kg)      24HR INTAKE/OUTPUT:      Intake/Output Summary (Last 24 hours) at 12/28/2021 0818  Last data filed at 12/28/2021 0555  Gross per 24 hour   Intake 1761 ml   Output 0 ml   Net 1761 ml        General: intubated, sedated  HEENT: normocephalic, atraumatic, ETT in place  Lungs: intubated, on vent, coarse breath sounds  Heart: regular rate and rhythm, no murmurs or rubs  Abdomen: soft, non-tender, non-distended  Ext: no cyanosis, ++ peripheral edema  Neuro: sedated        Electronically signed by Tashi Melton MD, MD

## 2021-12-28 NOTE — PROGRESS NOTES
Progress Note  Date:2021       Room:[place] [address]  Patient Higinio Oliveira     YOB: 1960     Age:61 y.o.    ROS: could not be obtained bc pt is intuabted    Subjective    Subjective   Review of Systems  Objective         Vitals Last 24 Hours:  TEMPERATURE:  Temp  Av.8 °F (36.6 °C)  Min: 97.4 °F (36.3 °C)  Max: 98.2 °F (36.8 °C)  RESPIRATIONS RANGE: Resp  Av.7  Min: 24  Max: 32  PULSE OXIMETRY RANGE: SpO2  Av.7 %  Min: 89 %  Max: 96 %  PULSE RANGE: Pulse  Av.9  Min: 80  Max: 102  BLOOD PRESSURE RANGE: No data recorded.  ; No data recorded. I/O (24Hr): Intake/Output Summary (Last 24 hours) at 2021 1506  Last data filed at 2021 0555  Gross per 24 hour   Intake 1745 ml   Output 0 ml   Net 1745 ml     Objective:  General Appearance:  Ill-appearing. Vital signs: (most recent): Blood pressure (!) 137/49, pulse 102, temperature 97.8 °F (36.6 °C), temperature source Oral, resp. rate (!) 32, height 5' 8\" (1.727 m), weight 272 lb 4.3 oz (123.5 kg), SpO2 94 %. HEENT: (+ ET tube)    Lungs: There are decreased breath sounds. Heart: S1 normal and S2 normal.    Abdomen: Abdomen is soft. Pulses: Distal pulses are intact. Skin:  Warm and dry. Labs/Imaging/Diagnostics    Labs:  CBC:  Recent Labs     21  0600 21  0846 21  0552 21  0754 21  2350 21  0537 21  0909   WBC 15.8*  --  16.8*  --   --  18.2*  --    RBC 4.24*  --  3.99*  --   --  3.76*  --    HGB 12.6*   < > 12.4*   < > 11.0* 11.3* 11.3*   HCT 36.2*  --  33.7*  --   --  31.9*  --    MCV 85.5  --  84.3  --   --  84.9  --    RDW 15.2*  --  15.3*  --   --  15.4*  --      --  259  --   --  229  --     < > = values in this interval not displayed.      CHEMISTRIES:  Recent Labs     21  0600 21  0846 21  0553 21  0754 21  2350 21  0537 21  0909     --  133*  --   --  134*  --    K 5.3*  --  4.7  --   --  5.5*  -- CL 95  --  92*  --   --  91*  --    CO2 16*  --  17*  --   --  17*  --    BUN 90*  --  95*  --   --  110*  --    CREATININE 8.03*   < > 8.24*   < > 11.8* 10.12* 13.6*   GLUCOSE 139*  --  227*  --   --  171*  --     < > = values in this interval not displayed. PT/INR:No results for input(s): PROTIME, INR in the last 72 hours. APTT:No results for input(s): APTT in the last 72 hours. LIVER PROFILE:  Recent Labs     12/26/21  0600 12/27/21  0553 12/28/21  0537   * 56* <5   * 71* 56*   BILITOT 0.3 0.3 0.3   ALKPHOS 125* 120* 127*       Imaging Last 24 Hours:  US DUP LOWER EXTREMITIES BILATERAL VENOUS    Result Date: 12/28/2021  US DUP LOWER EXTREMITIES BILATERAL VENOUS CLINICAL HISTORY: Ventilated patient. Covid positive. COMPARISONS: FINDINGS: Duplex color ultrasound as well as  both gray scale and spectral Doppler ultrasound of the deep venous system of both the left land right lower extremity from the inguinal ligaments to the popliteal fossa was performed. There are no findings of deep venous thrombus in the visualized vessels of the left or right  lower extremity from the common femoral vein to the popliteal. However, the findings are positive for thrombus within the right posterior tibial vein and left posterior tibial vein. POSITIVE FOR THROMBUS WITHIN THE RIGHT POSTERIOR TIBIAL VEIN AND LEFT POSTERIOR TIBIAL VEIN    Assessment//Plan           Hospital Problems           Last Modified POA    Acute respiratory failure with hypoxia ([address] 12/22/2021 Yes    Pneumonia due to COVID-19 virus 12/24/2021 Yes    Acute kidney injury ([address] 12/24/2021 Yes    Septic shock ([address] 12/28/2021 Yes    Acute deep vein thrombosis (DVT) of both lower extremities ([address] 12/28/2021 Yes        Assessment & Plan  12/28: Dialysis catheter was placed, started on heparin drip for right lower leg DVT. Taper off Levophed as tolerated. Continue with Protonix. Keep fingerstick 1 40-1 80. No active GI bleeding noted. Continue Actemra. possible needing IVC filter if pt contiues to have GI bleed. hgb is stable.    Electronically signed by Lynn Waldrop MD on 12/28/21 at 3:06 PM EST

## 2021-12-28 NOTE — PROGRESS NOTES
Pulmonary & Critical Care Medicine ICU Progress Note  Chief complaint : Acute respiratory failure    Subjunctive/24 hour events :   Patient seen and examined during multidisciplinary rounds with RN, charge nurse, RT, pharmacy, dietitian, and social service. On vent, sedated, and on Nimbex drip, currently on fentanyl propofol and Versed, also on Nimbex at 0.6 mcg/kg/min, no fever, he is on 90% FiO2, and 14 of PEEP, saturation 92%, plateau pressure 30, + bowel movement, tidal volume currently at 8 cc/kg however maintaining plateau pressure less than 30, he is +8 L, no urine output. Social History     Tobacco Use    Smoking status: Former Smoker     Packs/day: 1.50     Years: 30.00     Pack years: 45.00     Types: Cigarettes     Start date:      Quit date:      Years since quittin.0    Smokeless tobacco: Never Used   Substance Use Topics    Alcohol use:  Yes     Alcohol/week: 7.0 standard drinks     Types: 7 Cans of beer per week         Problem Relation Age of Onset    Heart Attack Mother     Cancer Father         bladder cancer    Cancer Sister         uterine cancer    Cancer Sister         breast cancer       Recent Labs     21  1633 21  2350   PHART 7.166* 7.120*   PSS2IYN 58* 65*   PO2ART 83* 96*       MV Settings:  Vent Mode: AC/VC Rate Set: 32 bmp/Vt Ordered: 550 mL/ /FiO2 : 90 %           IV:   midazolam 2 mg/hr (21 0600)    cisatracurium (NIMBEX) infusion 0.6 mcg/kg/min (21 0700)    norepinephrine Stopped (21 0925)    dextrose      sodium chloride 25 mL (21 1144)    fentaNYL (SUBLIMAZE) infusion 175 mcg/hr (21 0400)    propofol 40 mcg/kg/min (21 0749)       Vitals:  BP (!) 137/49   Pulse 92   Temp 98.2 °F (36.8 °C) (Oral)   Resp (!) 32   Ht 5' 8\" (1.727 m)   Wt 272 lb 4.3 oz (123.5 kg)   SpO2 (!) 89%   BMI 41.40 kg/m²    Tmax:        Intake/Output Summary (Last 24 hours) at 2021 0805  Last data filed at 2021 7883  Gross per 24 hour   Intake 1761 ml   Output 0 ml   Net 1761 ml       EXAM:  General: On vent, sedated, unresponsive  Head: normocephalic, atraumatic  Eyes:No gross abnormalities. ENT:  MMM no lesions, ET and OG tube in  Neck:  supple and no masses  Chest : Vent sounds, bilateral rales, no wheezing, nontender, tympanic  Heart[de-identified] Heart sounds are normal.  Regular rate and rhythm without murmur, gallop or rub. ABD:  symmetric, soft, non-tender, no guarding or rebound  Musculoskeletal : no cyanosis, no clubbing and trace edema  Neuro:  Unresponsive, sedated  Skin: No rashes or nodules noted. Lymph node:  no cervical nodes  Urology: Yes [facility]   Psychiatric: unresponsive      Medications:  Scheduled Meds:   insulin lispro  0-12 Units SubCUTAneous Q6H    metoclopramide  10 mg IntraVENous Q8H    meropenem  500 mg IntraVENous Q24H    vancomycin (VANCOCIN) intermittent dosing (placeholder)   Other RX Placeholder    pantoprazole  40 mg IntraVENous BID    heparin (porcine)  7,500 Units SubCUTAneous 3 times per day    chlorhexidine  15 mL Mouth/Throat BID    sennosides-docusate sodium  2 tablet Oral BID    polyethylene glycol  17 g Oral Daily    sodium chloride flush  5-40 mL IntraVENous 2 times per day    dexamethasone  6 mg IntraVENous Q24H       PRN Meds:  labetalol, glucose, dextrose, glucagon (rDNA), dextrose, sodium chloride flush, sodium chloride, heparin (porcine), heparin (porcine), hydrALAZINE, sodium chloride, midazolam, iopamidol    Results: reviewed by me   CBC:   Recent Labs     12/26/21  0600 12/26/21  0846 12/27/21  0552 12/27/21  0754 12/27/21  1633 12/27/21  2350 12/28/21  0537   WBC 15.8*  --  16.8*  --   --   --  18.2*   HGB 12.6*   < > 12.4*   < > 10.7* 11.0* 11.3*   HCT 36.2*  --  33.7*  --   --   --  31.9*   MCV 85.5  --  84.3  --   --   --  84.9     --  259  --   --   --  229    < > = values in this interval not displayed.      BMP:   Recent Labs     12/25/21  0847 12/25/21  1331 12/26/21  0600 12/26/21  0846 12/27/21  0048 12/27/21  0553 12/27/21  0754 12/27/21  1633 12/27/21  2350     --  138  --   --  133*  --   --   --    K 5.9*  --  5.3*  --   --  4.7  --   --   --    CL 95  --  95  --   --  92*  --   --   --    CO2 21  --  16*  --   --  17*  --   --   --    BUN 78*  --  90*  --   --  95*  --   --   --    CREATININE 6.60*   < > 8.03*   < >   < > 8.24* 10.8* 9.9* 11.8*    < > = values in this interval not displayed. LIVER PROFILE:   Recent Labs     12/26/21  0600 12/27/21  0553 12/28/21  0537   * 56* <5   * 71* 56*   BILITOT 0.3 0.3  --    ALKPHOS 125* 120*  --      PT/INR:   No results for input(s): PROTIME, INR in the last 72 hours. APTT:   No results for input(s): APTT in the last 72 hours. UA:No results for input(s): NITRITE, COLORU, PHUR, LABCAST, WBCUA, RBCUA, MUCUS, TRICHOMONAS, YEAST, BACTERIA, CLARITYU, SPECGRAV, LEUKOCYTESUR, UROBILINOGEN, BILIRUBINUR, BLOODU, GLUCOSEU, AMORPHOUS in the last 72 hours. Invalid input(s): Jose Luis Meeter    Cultures:  Negative so far  Films:  CXR reviewed by me and it showed bilateral groundglass infiltrate      Assessment:   This is a critically ill patient at risk of deterioration / death , needing close ICU monitoring and intervention due to below noted problems   · Acute hypoxic and hypercapnic respiratory failure  · Severe COVID-19 pneumonia  · Septic shock, ba shock physiology resolved for now, he is off Levophed  · Combined metabolic and respiratory acidosis  · Acute kidney injury, on dialysis  · Abnormal LFT  · D-dimer and impaired ventilation      Recommendation  · Vent support lung protective strategy  · head of the bed 30°  · We will try to wean Nimbex off again today  · Sedation with combination propofol and fentanyl target R ASS of 0 to -1, requiring Versed for adequate sedation  · Watch for ICU delirium: TV on, natural light, avoid benzos, pain control, early mobility, and family engagement  · PUD prophylaxis  · DVT prophylaxis  · Target blood sugar 140-180  · Dexamethasone 6 mg daily for 10 days  · Received Actemra  · Did not receive remdesivir due to abnormal LFT and renal failure  · Currently off Levophed   · Continue Protonix twice daily  · Continue Reglan  · Dialysis per nephrology  · Continue tube feed  · Continue senna S, DC lactulose   · Had GI bleed, for which we decreased heparin dose to prophylactic dose,    · Bilateral lower extremity ultrasound  · Repeat D-dimer tomorrow    Attempted to call patient wife today, no answer on all 3 phone numbers. Due to the immediate potential for life-threatening deterioration due to acute respiratory failure I spent 35  minutes providing critical care.  This time is excluding time spent performing procedures.           Electronically signed by Germain Eastman MD,  [facility] ,on 12/28/2021 at 8:05 AM

## 2021-12-28 NOTE — PROCEDURES
Internal jugular central dialysis venous catheter; Ultrasound guided. [id number]                                                             [id number]    INDICATION:   Dialysis       CONSENT:  The patient was counseled regarding the procedure, it's indications, risks, potential complications and alternatives and any questions were answered. Consent was obtained. PROCEDURE SUMMARY:   A time-out was performed. The patient's right neck region was prepped and draped in sterile fashion. The right internal jugular vein was accessed under ultrasound guidance using a finder needle and sheath. U/S images were permanently documented. Anesthesia was achieved with 1% lidocaine. The finder needle was inserted into the right neck under direct ultrasound guidance, and venous blood was withdrawn. The introducer needle was then inserted under direct ultrasound guidance and venous blood was withdrawn into the syringe. The syringe was removed and the guidewire was advanced through the introducer needle. The catheter ends in the superior vena cava. A small incision was made with a scalpel and the introducer needle was removed. A dilator was advanced over the guidewire until appropriate dilation was obtained. The dilator was removed and an central venous  catheter was advanced over the guidewire and secured into place with 2 sutures at 20 cm. At time of procedure completion, all ports aspirated and flushed properly.        Estimated Blood loss   less than 5 cc    COMPLICATIONS:  None

## 2021-12-28 NOTE — PROGRESS NOTES
Uneventful shift. Monitor has been sinus rhythm. Vital signs normal.  Sedated and paralyzed on ventilator. Not on any pressors.

## 2021-12-28 NOTE — FLOWSHEET NOTE
Shift summary    Pt remained supine throughout shift, pt sats stable, lowest desat 87% while supine. On vent, sedated, paralyzed. TOF initially 0/4, nimbex gtt decreased, TOF 4/4, then TOF 1-2/4 on 3 amps. Levo weaned off early AM. PERRL, LS dim, no secretions from ETT. MP SR. Generalized trace NP edema noted. [facility]- pt anuric x a few days, dark brown sludgy sediment in. Removed @ 0810 per protocol. Liquid brown/green BM x4, linen changed. No mepliex to coccyx d.t incontinence. Pt abd rounded but less distended throughout shift. Initial residual of  cc however residual at noon and 1600 20 cc each. Skin- DTI to nose and chin. Noted ? DTI to R elbow. R wrist appears pt had blisters pop or bad IV- dark purple/maroon. SCD's on, mepliex to heels, boots in place. All tubing changed for IVs, L rad a line and R PICC drsg changed. Trialysis cath drsg changed 12/26 so left alone as possible HD tomorrow. Repositioned throughout shift, lateral rotation on bed with pt tolerating. Update to pt wife per request in the evening.  Electronically signed by Isamar Steward RN on 12/27/2021 at 7:46 PM

## 2021-12-28 NOTE — PROGRESS NOTES
Pharmacy Vancomycin Consult     Vancomycin Day: 4  Current Dosing: HD Dosing - last dose 1250 mg x1 on 12/26/21    Temp 24hr max:  97.9    Recent Labs     12/26/21  0600 12/27/21  0552 12/27/21  0553 12/27/21  0754 12/28/21  0537 12/28/21  0909   BUN   < >  --  95*  --  110*  --    CREATININE   < >  --  8.24*   < > 10.12* 13.6*   WBC  --  16.8*  --   --  18.2*  --     < > = values in this interval not displayed. Intake/Output Summary (Last 24 hours) at 12/28/2021 1115  Last data filed at 12/28/2021 0555  Gross per 24 hour   Intake 1761 ml   Output 0 ml   Net 1761 ml     Cultures  Recent Labs     12/25/21  1200 12/22/21  1934 12/22/21  1827   BC No Growth to date. Any change in status will be called. < >  --    BLOODCULT2 No Growth to date. Any change in status will be called. < >  --    COVID19  --   --  DETECTED*    < > = values in this interval not displayed. Height: 5' 8\" (172.7 cm), Weight: 272 lb 4.3 oz (123.5 kg), Body mass index is 41.4 kg/m². Estimated Creatinine Clearance: 7 mL/min (A) (based on SCr of 13.6 mg/dL [facility])). Hemodialysis Intake (ml): 800 mlDialyzer Clearance: Clotted. Trough:  Recent Labs     12/28/21  0537   VANCORANDOM 17.7      Assessment/Plan:  Will order vancomycin 1500 mg x1 dose based on pre-dialysis level of 17.7 mg/L. Repeat pre-dialysis level ordered for 12/30 @ 0600 to guide further dosing.      Thank you,   José Luis Galloway, PharmD  PGY-1 Pharmacy Resident  12/28/2021 11:15 AM

## 2021-12-29 NOTE — CONSULTS
Right eye: No discharge. Left eye: No discharge. Conjunctiva/sclera: Conjunctivae normal.   Cardiovascular:      Rate and Rhythm: Normal rate and regular rhythm. Pulmonary:      Effort: He is intubated. Breath sounds: Decreased breath sounds present. No wheezing or rales. Abdominal:      General: Bowel sounds are normal. There is no distension. Palpations: Abdomen is soft. Tenderness: There is no abdominal tenderness. Musculoskeletal:         General: No deformity. Cervical back: Neck supple. Right lower leg: No edema. Left lower leg: No edema. Skin:     General: Skin is warm and dry. Comments: Wounds to right arm. Neurological:      Mental Status: He is unresponsive. Psychiatric:         Speech: He is noncommunicative. Cognition and Memory: Cognition is impaired. Allergies:       Allergies   Allergen Reactions    Pcn [Penicillins] Shortness Of Breath and Swelling     Was given as a child for Bee sting: swelling and SoB       Medications:      Current Facility-Administered Medications   Medication Dose Route Frequency Provider Last Rate Last Admin    heparin (porcine) injection 4,000 Units  4,000 Units IntraVENous PRN Lorelei Hait APRN - CNP        heparin (porcine) injection 2,000 Units  2,000 Units IntraVENous PRN Lorelei Federicot, APRN - CNP        heparin (porcine) 25,000 Units in sodium chloride 0.9 % 250 mL infusion  8 Units/kg/hr IntraVENous Continuous Lorelei Luda APRN - CNP 9.9 mL/hr at 12/28/21 2144 8 Units/kg/hr at 12/28/21 2144    insulin lispro (HUMALOG) injection vial 0-12 Units  0-12 Units SubCUTAneous Q6H Dale Anaya MD   2 Units at 12/28/21 1838    metoclopramide (REGLAN) injection 10 mg  10 mg IntraVENous Q8H Dale Anaya MD   10 mg at 12/28/21 1704    meropenem (MERREM) 500 mg IVPB (mini-bag)  500 mg IntraVENous Q24H Dale Anaya MD   Stopped at 12/28/21 1325    midazolam (VERSED) 100 mg in dextrose 5 % 100 mL infusion  1-10 mg/hr IntraVENous Continuous Angel Ferguson Sedar, DO 2 mL/hr at 12/28/21 1200 2 mg/hr at 12/28/21 1200    pantoprazole (PROTONIX) injection 40 mg  40 mg IntraVENous BID Katharina Parson MD   40 mg at 12/28/21 2242    cisatracurium besylate (NIMBEX) 200 mg in sodium chloride 0.9 % 100 mL infusion  0.5-10 mcg/kg/min IntraVENous Continuous Katharina Parson MD 4.3 mL/hr at 12/28/21 2314 1.202 mcg/kg/min at 12/28/21 2314    labetalol (NORMODYNE;TRANDATE) injection 10 mg  10 mg IntraVENous Q3H PRN Katharina Parson MD        norepinephrine (LEVOPHED) 16 mg in dextrose 5% 250 mL infusion  2-100 mcg/min IntraVENous Continuous Mehdi RODRIGUEZ Sedar, DO 5.6 mL/hr at 12/29/21 0407 6 mcg/min at 12/29/21 0407    chlorhexidine (PERIDEX) 0.12 % solution 15 mL  15 mL Mouth/Throat BID Ophelia Ramon APRN - CNP   15 mL at 12/28/21 2235    sennosides-docusate sodium (SENOKOT-S) 8.6-50 MG tablet 2 tablet  2 tablet Oral BID CIERA Heredia - CNP   2 tablet at 12/27/21 2228    polyethylene glycol (GLYCOLAX) packet 17 g  17 g Oral Daily Ophelia Ramon, APRN - CNP   17 g at 12/28/21 0749    glucose (GLUTOSE) 40 % oral gel 15 g  15 g Oral PRN Ophelia Ramon APRN - CNP        dextrose 50 % IV solution  12.5 g IntraVENous PRN Ophelia Ramon, APRN - CNP        glucagon (rDNA) injection 1 mg  1 mg IntraMUSCular PRN Ophelia Ramon, APRN - CNP        dextrose 5 % solution  100 mL/hr IntraVENous PRN Ophelia Ramon, APRN - CNP        sodium chloride flush 0.9 % injection 5-40 mL  5-40 mL IntraVENous 2 times per day Ophelia Ramon APRN - CNP   10 mL at 12/28/21 2100    sodium chloride flush 0.9 % injection 5-40 mL  5-40 mL IntraVENous PRN Ophelia Ramon, APRN - CNP        0.9 % sodium chloride infusion  25 mL IntraVENous PRN CIERA Heredia  mL/hr at 12/27/21 1144 25 mL at 12/27/21 1144    dexamethasone (DECADRON) injection 6 mg  6 mg IntraVENous Q24H Costa Villa MD   6 mg at 12/28/21 1802    heparin (porcine) injection 4,000 Units  4,000 Units IntraVENous PRN Christina Gonzales APRN - CNP   2,500 Units at 21 1150    heparin (porcine) injection 2,000 Units  2,000 Units IntraVENous PRN Christina Gonzales APRN - CNP   2,000 Units at 21 2250    fentaNYL (SUBLIMAZE) 1,000 mcg in sodium chloride 0.9 % 100 mL infusion  12.5-200 mcg/hr IntraVENous Continuous Ethfavian Roper MD 17.5 mL/hr at 21 0428 175 mcg/hr at 21 0428    propofol injection  5-50 mcg/kg/min IntraVENous Titrated Israel Graham MD 26.3 mL/hr at 21 0210 38 mcg/kg/min at 21 0210    hydrALAZINE (APRESOLINE) injection 10 mg  10 mg IntraVENous Q6H PRN Israel Graham MD   10 mg at 21 1126    0.9 % sodium chloride bolus  30 mL IntraVENous PRN Israel Graham MD        midazolam PF (VERSED) injection 2 mg  2 mg IntraVENous Q2H PRN Dee Gell Sedar, DO   4 mg at 21 1201    iopamidol (ISOVUE-300) 61 % injection 100 mL  100 mL IntraVENous ONCE PRN Israel Graham MD           History: PMHx:  Past Medical History:   Diagnosis Date    History of testicular cancer        PSHx:  Past Surgical History:   Procedure Laterality Date    TESTICLE REMOVAL Left     with LN dissection (Seperarate surgery )        Social Hx:  Social History     Socioeconomic History    Marital status:      Spouse name: None    Number of children: None    Years of education: None    Highest education level: None   Occupational History    None   Tobacco Use    Smoking status: Former Smoker     Packs/day: 1.50     Years: 30.00     Pack years: 45.00     Types: Cigarettes     Start date:      Quit date:      Years since quittin.0    Smokeless tobacco: Never Used   Vaping Use    Vaping Use: Never used   Substance and Sexual Activity    Alcohol use:  Yes     Alcohol/week: 7.0 standard drinks     Types: 7 Cans of beer per week    Drug use: No    Sexual activity: None   Other Topics Concern    None   Social History Narrative    None Social Determinants of Health     Financial Resource Strain:     Difficulty of Paying Living Expenses: Not on file   Food Insecurity:     Worried About Running Out of Food in the Last Year: Not on file    Kannan of Food in the Last Year: Not on file   Transportation Needs:     Lack of Transportation (Medical): Not on file    Lack of Transportation (Non-Medical):  Not on file   Physical Activity:     Days of Exercise per Week: Not on file    Minutes of Exercise per Session: Not on file   Stress:     Feeling of Stress : Not on file   Social Connections:     Frequency of Communication with Friends and Family: Not on file    Frequency of Social Gatherings with Friends and Family: Not on file    Attends Zoroastrianism Services: Not on file    Active Member of [address] [place] [address] [employer] or Organizations: Not on file    Attends Club or Organization Meetings: Not on file    Marital Status: Not on file   Intimate Partner Violence:     Fear of Current or Ex-Partner: Not on file    Emotionally Abused: Not on file    Physically Abused: Not on file    Sexually Abused: Not on file   Housing Stability:     Unable to Pay for Housing in the Last Year: Not on file    Number of [place] in the Last Year: Not on file    Unstable Housing in the Last Year: Not on file       Family Hx:  Family History   Problem Relation Age of Onset    Heart Attack Mother     Cancer Father         bladder cancer    Cancer Sister         uterine cancer    Cancer Sister         breast cancer       LABS: Reviewed     CBC:  Lab Results   Component Value Date    WBC 20.4 12/28/2021    RBC 3.76 12/28/2021    HGB 10.7 12/29/2021    HCT 32.1 12/28/2021    MCV 85.4 12/28/2021    MCH 29.4 12/28/2021    MCHC 34.4 12/28/2021    RDW 15.8 12/28/2021     12/28/2021     CBC with Differential:   Lab Results   Component Value Date    WBC 20.4 12/28/2021    RBC 3.76 12/28/2021    HGB 10.7 12/29/2021    HCT 32.1 12/28/2021     12/28/2021    MCV 85.4 12/28/2021    MCH 29.4 12/28/2021    MCHC 34.4 12/28/2021    RDW 15.8 12/28/2021    NRBC 1 12/25/2021    BANDSPCT 3 12/27/2021    METASPCT 3 12/27/2021    LYMPHOPCT 3.4 12/28/2021    MONOPCT 2.5 12/28/2021    MYELOPCT 1 12/27/2021    BASOPCT 0.2 12/28/2021    MONOSABS 0.4 12/28/2021    LYMPHSABS 0.6 12/28/2021    EOSABS 0.0 12/28/2021    BASOSABS 0.0 12/28/2021     CMP:    Lab Results   Component Value Date     12/28/2021    K 5.5 12/28/2021    K 5.9 12/25/2021    CL 91 12/28/2021    CO2 17 12/28/2021     12/28/2021    CREATININE 10.2 12/29/2021    CREATININE 10.12 12/28/2021    GFRAA 6 12/29/2021    LABGLOM 5 12/29/2021    GLUCOSE 171 12/28/2021    PROT 5.6 12/28/2021    LABALBU 2.7 12/28/2021    CALCIUM 6.7 12/28/2021    BILITOT 0.3 12/28/2021    ALKPHOS 127 12/28/2021    AST <5 12/28/2021    ALT 56 12/28/2021     BMP:    Lab Results   Component Value Date     12/28/2021    K 5.5 12/28/2021    K 5.9 12/25/2021    CL 91 12/28/2021    CO2 17 12/28/2021     12/28/2021    LABALBU 2.7 12/28/2021    CREATININE 10.2 12/29/2021    CREATININE 10.12 12/28/2021    CALCIUM 6.7 12/28/2021    GFRAA 6 12/29/2021    LABGLOM 5 12/29/2021    GLUCOSE 171 12/28/2021     TSH: No results found for: TSH  Vitamin B12 and Folate: No components found for: FOLIC,  F84  Urinalysis: No results found for: NITRU, [address], BACTERIA, RBCUA, BLOODU, SPECGRAV, GLUCOSEU        FUNCTIONAL ADL´S:     Independent: [  ] Eating, [   ] Dressing, [   ] Transferring, [   ] Jose Ramon Linen, [   ] Janece Bounds, [   ] Continence  Dependent   : [ x ] Eating, [ x ] Dressing, [ x ] Transferring, [ x ] Toileting, [ x ] Bathing, [ x ] Continence  W. assistant : [  ] Eating, [   ] Dressing, [   ] Transferring, [   ] Jose Ramon Linen, [   ] Janece Vargas, [   ] Continence    Radiology: Reviewed      XR CHEST PORTABLE    Result Date: 12/28/2021  Exam: XR CHEST PORTABLE History:  tube change Technique: AP portable view of the chest obtained.  Comparison: Chest x-ray from December 20, 2021 and the 1404 hours. Chest x-ray portable Findings: The patient is intubated with the tip of the endotracheal tube 1 cm above the aminata. There is no change in the bilateral internal jugular central venous lines in the right upper extremity PICC line. The cardiomediastinal silhouette is within normal limits. There is no pneumothorax. There are bilateral patchy alveolar opacities. There are no pleural effusions. Bones of the thorax appear intact. Status post intubation. There are bilateral alveolar opacities , infiltrates worrisome for viral COVID-19 pneumonia. XR CHEST PORTABLE    Result Date: 12/28/2021  EXAMINATION: XR CHEST PORTABLE CLINICAL HISTORY: LINE PLACEMENT COMPARISONS: CHEST RADIOGRAPH, DECEMBER 25, 2021, DECEMBER 24, 2021 FINDINGS: Endotracheal tube tip 10 cm from aminata at the thoracic apex. Nasogastric tube courses beneath diaphragm and the stomach. Tip of right and left jugular vein central lines in superior vena cava. Tip right PICC line at junction right atrium and superior vena cava. Patient leaning to left. Osseous structures intact. Cardiopericardial silhouette normal. Ill-defined areas increase opacity right and left lungs diffusely. TIP OF ENDOTRACHEAL TUBE 10 CM SUPERIOR TO AMINATA AT THORACIC APEX. RECOMMEND ADVANCING ENDOTRACHEAL TUBE 8 CM. BILATERAL ATELECTASIS/PNEUMONIA. ABOVE FINDINGS DISCUSSED WITH PATIENT'S NURSE LORRI VIA TELEPHONE IN THE ICU AT 3:20 PM, TUESDAY, DECEMBER 28, 2021. US DUP LOWER EXTREMITIES BILATERAL VENOUS    Result Date: 12/28/2021  US DUP LOWER EXTREMITIES BILATERAL VENOUS CLINICAL HISTORY: Ventilated patient. Covid positive. COMPARISONS: FINDINGS: Duplex color ultrasound as well as  both gray scale and spectral Doppler ultrasound of the deep venous system of both the left land right lower extremity from the inguinal ligaments to the popliteal fossa was performed.  There are no findings of deep venous thrombus in the visualized vessels of the left or right  lower extremity from the common femoral vein to the popliteal. However, the findings are positive for thrombus within the right posterior tibial vein and left posterior tibial vein. POSITIVE FOR THROMBUS WITHIN THE RIGHT POSTERIOR TIBIAL VEIN AND LEFT POSTERIOR TIBIAL VEIN         Assessment and plan:  1. Encounter for hospice discussion  Patient's prognosis is poor. He is currently in multiorgan failure due to severe COVID-19 pneumonia. Patient is appropriate for hospice and terminal wean at this time. Spoke with wife and discussed patient's condition. She would like to meet with hospice with plans to proceed with comfort measures. Case discussed with patient's RN and intensivist. RN will reach out to family regarding visitation at end of life. Reached out to hospice to set up meeting with family. -Advance Care Planning  Discussed goals of care with patient. Explained in extensive detail nuances between full code, DNR CCA and DNR CC. Patient's spouse has made the decision for patient to be [facility]. Spouse, Vasquez Jarrett, is patient's next of kin. -Goals of Care Discussion:  Disease process and goals of treatment were discussed in basic terms. Chetan's goal is to optimize available comfort care measures. We discussed all care options contingent on treatment response and QOL. Much active listening, presence, and emotional support were given. Patient is being treated for multiple medical conditions this admission includin. Acute hypoxic and hypercapnic respiratory failure due to severe COVID pneumonia  2. HTN emergency  3. SUREKHA on new HD  4. Hyperglycemia  5. Hypercoagulable state with bilateral lower extremity DVT  6. Obesity  7. Septic shock  8. Metabolic and respiratory acidosis  9. Abnormal LFT  10. Hyperkalemia  11.  Hypoalbuminemia    MDM: High  Billing Based on Time: No    Electronically signed by CIERA Jain CNP on 2021 at 7:11 AM

## 2021-12-29 NOTE — PROGRESS NOTES
Infectious Diseases Inpatient Progress Note          HISTORY OF PRESENT ILLNESS:  Follow up critical COVID-19 with septic shock, severe hypoxia requiring intubation, currently in ICU,ventilated on assist control 70%, has bilateral lower extremity deep vein thrombosis. No hypotension or pressors. Tolerating tube feeding. Positive liquid stools. on IV Vanco and meropenem, well tolerated  Acute kidney injury, worsening  Negative blood and urine cultures  Highly elevated D-dimer  Persistent leukocytosis and anemia  Remains on heparin drip  Negative blood cultures  Levophed is down to 2 mics  Positive facial and nose ulcers  Current Medications:     lactulose  20 g Oral TID    insulin lispro  0-12 Units SubCUTAneous Q6H    metoclopramide  10 mg IntraVENous Q8H    meropenem  500 mg IntraVENous Q24H    pantoprazole  40 mg IntraVENous BID    chlorhexidine  15 mL Mouth/Throat BID    sennosides-docusate sodium  2 tablet Oral BID    polyethylene glycol  17 g Oral Daily    sodium chloride flush  5-40 mL IntraVENous 2 times per day    dexamethasone  6 mg IntraVENous Q24H       Allergies:  Pcn [penicillins]      Review of Systems  ROS  unable to provide ROS because of sedation      Physical Exam  Vitals:    12/29/21 0411 12/29/21 0723 12/29/21 0800 12/29/21 1021   BP:       Pulse: 103 104 103 101   Resp: (!) 32 (!) 32  (!) 32   Temp:       TempSrc:       SpO2: 92% 94%  94%   Weight:       Height:         General Appearance: sedated, non responsive to verbals  Skin: warm and dry, no rash.    Head: normocephalic and atraumatic  Eyes:  anicteric sclerae and skin  Intact central lines  Positive nodes and facial ulcers  Lungs: Bilateral minimal basilar rales  Heart: nl S1/S2, no murmur  Abdomen: soft, no distention or rigidity  NEUROLOGICAL: Does not follow any commands  Bilateral legs swelling   [place] with low urine output, kristine        DATA:    Lab Results   Component Value Date    WBC 20.4 (H) 12/28/2021    HGB 8.8 (L) 12/29/2021    HCT 32.1 (L) 12/28/2021    MCV 85.4 12/28/2021     12/28/2021     Lab Results   Component Value Date    CREATININE 11.8 (HH) 12/29/2021     (HH) 12/28/2021     (L) 12/28/2021    K 5.5 (H) 12/28/2021    CL 91 (L) 12/28/2021    CO2 17 (L) 12/28/2021       Hepatic Function Panel:  Lab Results   Component Value Date    ALKPHOS 127 12/28/2021    ALT 56 12/28/2021    AST <5 12/28/2021    PROT 5.6 12/28/2021    BILITOT 0.3 12/28/2021    LABALBU 2.7 12/28/2021       Microbiology:   No results for input(s): BC in the last 72 hours. No results for input(s): [place] in the last 72 hours. Recent Labs     12/26/21 1913   LABURIN No growth 48 hours     No results for input(s): WNDABS in the last 72 hours. No results for input(s): CULTRESP in the last 72 hours. No results for input(s): PH, PO2, PCO2, HCO3, BE, O2SAT in the last 72 hours. XR CHEST (SINGLE VIEW FRONTAL)    Result Date: 12/24/2021  There are bilateral alveolar opacities , infiltrates worrisome for viral COVID-19 pneumonia. CT HEAD WO CONTRAST    Result Date: 12/23/2021  Impression: Pansinusitis. All CT scans at this facility use dose modulation, iterative reconstruction, and/or weight based dosing when appropriate to reduce radiation dose to as low as reasonably achievable. XR CHEST PORTABLE    Result Date: 12/25/2021  Stable exam since yesterday. XR CHEST PORTABLE    Result Date: 12/23/2021  LEFT INTERNAL JUGULAR APPROACH CENTRAL VENOUS CATHETER PLACED IN EXPECTED POSITION. OTHERWISE, STABLE CHEST FROM YESTERDAY. XR CHEST PORTABLE    Result Date: 12/22/2021  Diffuse bilateral airspace opacities may represent pulmonary edema or pneumonia. XR CHEST PORTABLE    Result Date: 12/22/2021  There is severe increased pulmonary markings throughout both lungs worrisome for fluid overload versus viral infiltrates, pneumonia.      XR CHEST ABDOMEN NG PLACEMENT    Result Date: 12/23/2021  ENDOTRACHEAL AND OROGASTRIC TUBES IN EXPECTED POSITIONS. US RETROPERITONEAL LIMITED    Result Date: 12/23/2021  ESSENTIALLY NEGATIVE LIMITED RENAL ULTRASOUND.      US DUP LOWER EXTREMITIES BILATERAL VENOUS    Result Date: 12/28/2021  POSITIVE FOR THROMBUS WITHIN THE RIGHT POSTERIOR TIBIAL VEIN AND LEFT POSTERIOR TIBIAL VEIN    D-dimer=10.67 yesterday  Ferritin is trending down  Blood cultures have been negative    IMPRESSION:    · Septic shock with persistent leukocytosis  · critical COVID 19 pneumonia  · Acute respiratory failure with hypoxia  · Acute kidney injury  · Hypercoagulable state with bilateral lower extremity acute DVT  ·     Patient Active Problem List   Diagnosis    Acute respiratory failure with hypoxia (HCC)    Pneumonia due to COVID-19 virus    Acute kidney injury ([address]    [facility]    Acute deep vein thrombosis (DVT) of both lower extremities (HCC)       PLAN:  · IV meropenem for now  · Vent support and weaning as tolerated  · Vasopressor support and weaning as tolerated  · Follow-up D-dimer and ferritin  · Follow-up CBC BMP and chest x-ray  · Follow-up blood cultures  · Heparin and Decadron as ordered  Patient has a guarded prognosis  35 minutes were spent reviewing chart and implementing care and examining patient  Discussed with otherRN    Lori Chua MD

## 2021-12-29 NOTE — PROGRESS NOTES
Daily Update    From:HOME WITH SPOUSE, INDEP, NO 02, NO CHILDREN     PMH: ETOH, TESTICULAR CA    Anticipated Discharge Date:TBD    Anticipated Discharge Disposition:TBD    Patient Mobility or PT/OT ordered:- ONCE EXTUBATED  CONSULTS: ID, RENAL, PULM    Covid Test Date and Result: 12/22++, TESTED + PRIOR TO ER VISIT  29/2.09-->110/10.12  WBC 12.8--20.4;  VD: +FATUMA LOWER EXT  12/24 HD CATH, HD-12/24, 12/26, 12/28 - NEEDS NEW HD CATH    Barriers to Discharge: SR/VENT 70%, PRONE, PICC, DECADRON, HEPARIN, REMDES, MERREM, VANCO, LEVO/FENT/PROP/VERSED/NIMBEX DRIPS    Risk of Unplanned Readmission:  17   -CMI/ACP DONE

## 2021-12-29 NOTE — PROGRESS NOTES
Progress Note  Date:2021       Room:[place] [address]  Patient Debbi Yeh     YOB: 1960     Age:61 y.o.    ROS: could not be obtained bc pt is intuabted    Subjective    Subjective     Review of Systems    Objective         Vitals Last 24 Hours:  TEMPERATURE:  Temp  Av.8 °F (36.6 °C)  Min: 97.7 °F (36.5 °C)  Max: 98 °F (36.7 °C)  RESPIRATIONS RANGE: Resp  Av.9  Min: 23  Max: 33  PULSE OXIMETRY RANGE: SpO2  Av.8 %  Min: 81 %  Max: 98 %  PULSE RANGE: Pulse  Av.4  Min: 88  Max: 139  BLOOD PRESSURE RANGE: Systolic (85RDK), WRI:288 , Min:115 , QCQ:554   ; Diastolic (28ECW), EAD:34, Min:53, Max:64    I/O (24Hr): Intake/Output Summary (Last 24 hours) at 2021 1332  Last data filed at 2021 2224  Gross per 24 hour   Intake 1810.57 ml   Output 1900 ml   Net -89.43 ml     Objective:  General Appearance:  Ill-appearing. Vital signs: (most recent): Blood pressure 132/64, pulse 101, temperature 97.7 °F (36.5 °C), resp. rate (!) 32, height 5' 8\" (1.727 m), weight 271 lb 2.7 oz (123 kg), SpO2 94 %. HEENT: (+ ET tube)    Lungs: There are decreased breath sounds. Heart: S1 normal and S2 normal.    Abdomen: Abdomen is soft. Pulses: Distal pulses are intact. Skin:  Warm and dry. Labs/Imaging/Diagnostics    Labs:  CBC:  Recent Labs     21  0552 21  0754 21  0537 21  0909 21  1721 21  0003 21  0754   WBC 16.8*  --  18.2*  --  20.4*  --   --    RBC 3.99*  --  3.76*  --  3.76*  --   --    HGB 12.4*   < > 11.3*   < > 11.1*  11.4* 10.7* 8.2*   HCT 33.7*  --  31.9*  --  32.1*  --   --    MCV 84.3  --  84.9  --  85.4  --   --    RDW 15.3*  --  15.4*  --  15.8*  --   --      --  229  --  297  --   --     < > = values in this interval not displayed.      CHEMISTRIES:  Recent Labs     21  0553 21  0754 21  0537 21  0909 21  1721 21  0003 21  0754   *  --  134*  --   --   -- --    K 4.7  --  5.5*  --   --   --   --    CL 92*  --  91*  --   --   --   --    CO2 17*  --  17*  --   --   --   --    BUN 95*  --  110*  --   --   --   --    CREATININE 8.24*   < > 10.12*   < > 13.0* 10.2* 9.5*   GLUCOSE 227*  --  171*  --   --   --   --     < > = values in this interval not displayed. PT/INR:  Recent Labs     12/28/21 1952   PROTIME 14.5   INR 1.1     APTT:  Recent Labs     12/28/21 1952   APTT 63.1*     LIVER PROFILE:  Recent Labs     12/27/21  0553 12/28/21  0537   AST 56* <5   ALT 71* 56*   BILITOT 0.3 0.3   ALKPHOS 120* 127*       Imaging Last 24 Hours:  US DUP LOWER EXTREMITIES BILATERAL VENOUS    Result Date: 12/28/2021  US DUP LOWER EXTREMITIES BILATERAL VENOUS CLINICAL HISTORY: Ventilated patient. Covid positive. COMPARISONS: FINDINGS: Duplex color ultrasound as well as  both gray scale and spectral Doppler ultrasound of the deep venous system of both the left land right lower extremity from the inguinal ligaments to the popliteal fossa was performed. There are no findings of deep venous thrombus in the visualized vessels of the left or right  lower extremity from the common femoral vein to the popliteal. However, the findings are positive for thrombus within the right posterior tibial vein and left posterior tibial vein. POSITIVE FOR THROMBUS WITHIN THE RIGHT POSTERIOR TIBIAL VEIN AND LEFT POSTERIOR TIBIAL VEIN    Assessment//Plan           Hospital Problems           Last Modified POA    Acute respiratory failure with hypoxia ([address] 12/22/2021 Yes    Pneumonia due to COVID-19 virus 12/24/2021 Yes    Acute kidney injury ([address] 12/24/2021 Yes    Septic shock ([address] 12/28/2021 Yes    Acute deep vein thrombosis (DVT) of both lower extremities ([address] 12/28/2021 Yes        Assessment & Plan    12/28: Dialysis catheter was placed, started on heparin drip for right lower leg DVT. Taper off Levophed as tolerated. Continue with Protonix. Keep fingerstick 1 40-1 80.   No active GI bleeding noted. Continue Actemra. possible needing IVC filter if pt contiues to have GI bleed. hgb is stable. 12/29: Patient was seen and evaluated. On pressors to keep map above 65. Continue with heparin drip. Continue with Reglan, HD as per nephrology. Continue tube feeds. Watch for GI bleed.   Electronically signed by Gurpreet Angeles MD on 12/28/21 at 3:06 PM EST

## 2021-12-29 NOTE — PROGRESS NOTES
NIGHT SHIFT NURSING NOTE    1930: Report obtained and care assumed. 2040 Lab on unit (second time) to draw 6p Hep Xa. 2100 HD started, R IJ cath (NEW line 12/28) with resistance - flow rate 250-290    2020: Lab resulted blood work as INR/PTT; notified lab of possible error, they will re-run remaining blood for Xa analysis. .. 2230: HD completed 1.5L removed    2250: No response from lab and no result for Hep Xa. Heparin continues to infuse at this time;   HIGH RISK for complications!!    9639:  ** Noted Left IJ needs removed, not done prior to starting heparin infusion, I will not remove without blood analysis I.e., INR and Xa results. 12/29/2021    0400: Still no blood work for Heparin infusion????, spoke with  again, no resolve. Will need to escalate issue in the morning. Tele with peaked T-waves, regular rate and rhythm at 103. Weaning nimbex to a TOF of 2/4 twitches; able to obtain data from Right temporal area, PNS setting at pt baseline of 4 Hz obtained prior to initiation of NMB agent.     * Note Necrotic tissue injury over right anterior forearm and wrist.

## 2021-12-29 NOTE — PROGRESS NOTES
Pulmonary & Critical Care Medicine ICU Progress Note  Chief complaint : Acute respiratory failure    Subjunctive/24 hour events :   Patient seen and examined during multidisciplinary rounds with RN, charge nurse, RT, pharmacy, dietitian, and social service. On vent, sedated, currently on 6 mcg/min of Levophed, on heparin drip, sedated with fentanyl propofol and Versed, also on Nimbex drip, is on 70% FiO2, and 12 of PEEP, saturation  94%, temperature 97.7,  Tolerates tube feed, no bowel movement, urine output none, dialysis yesterday 1900 cc. Social History     Tobacco Use    Smoking status: Former Smoker     Packs/day: 1.50     Years: 30.00     Pack years: 45.00     Types: Cigarettes     Start date:      Quit date:      Years since quittin.0    Smokeless tobacco: Never Used   Substance Use Topics    Alcohol use:  Yes     Alcohol/week: 7.0 standard drinks     Types: 7 Cans of beer per week         Problem Relation Age of Onset    Heart Attack Mother     Cancer Father         bladder cancer    Cancer Sister         uterine cancer    Cancer Sister         breast cancer       Recent Labs     21  1721 21  0003   PHART 7.110* 7.172*   RBM2XIF 57* 58*   PO2ART 153* 91*       MV Settings:  Vent Mode: AC/VC Rate Set: 32 bmp/Vt Ordered: 530 mL/ /FiO2 : 70 %           IV:   heparin 25,000 units in 0.9% sodium chloride 250 mL infusion 8 Units/kg/hr (21 214)    midazolam 2 mg/hr (21 1200)    cisatracurium (NIMBEX) infusion 1.202 mcg/kg/min (21 2314)    norepinephrine 6 mcg/min (21 0407)    dextrose      sodium chloride 25 mL (21 1144)    fentaNYL (SUBLIMAZE) infusion 175 mcg/hr (21 0428)    propofol 38 mcg/kg/min (21 0210)       Vitals:  /64   Pulse 104   Temp 97.7 °F (36.5 °C)   Resp (!) 32   Ht 5' 8\" (1.727 m)   Wt 271 lb 2.7 oz (123 kg)   SpO2 94%   BMI 41.23 kg/m²    Tmax:        Intake/Output Summary (Last 24 hours) at 12/29/2021 0758  Last data filed at 12/28/2021 2224  Gross per 24 hour   Intake 1810.57 ml   Output 1900 ml   Net -89.43 ml       EXAM:  General: On vent, sedated, unresponsive  Head: normocephalic, atraumatic  Eyes:No gross abnormalities. ENT:  MMM no lesions, ET and OG tube in  Neck:  supple and no masses  Chest : Vent sounds, bilateral rales, no wheezing, nontender, tympanic  Heart[de-identified] Heart sounds are normal.  Regular rate and rhythm without murmur, gallop or rub. ABD:  symmetric, soft, non-tender, no guarding or rebound  Musculoskeletal : no cyanosis, no clubbing and trace edema  Neuro:  Unresponsive, sedated  Skin: No rashes or nodules noted. Lymph node:  no cervical nodes  Urology: Yes [facility]   Psychiatric: unresponsive      Medications:  Scheduled Meds:   insulin lispro  0-12 Units SubCUTAneous Q6H    metoclopramide  10 mg IntraVENous Q8H    meropenem  500 mg IntraVENous Q24H    pantoprazole  40 mg IntraVENous BID    chlorhexidine  15 mL Mouth/Throat BID    sennosides-docusate sodium  2 tablet Oral BID    polyethylene glycol  17 g Oral Daily    sodium chloride flush  5-40 mL IntraVENous 2 times per day    dexamethasone  6 mg IntraVENous Q24H       PRN Meds:  heparin (porcine), heparin (porcine), labetalol, glucose, dextrose, glucagon (rDNA), dextrose, sodium chloride flush, sodium chloride, heparin (porcine), heparin (porcine), hydrALAZINE, sodium chloride, midazolam, iopamidol    Results: reviewed by me   CBC:   Recent Labs     12/27/21  0552 12/27/21  0754 12/28/21  0537 12/28/21  0537 12/28/21  0909 12/28/21  1721 12/29/21  0003   WBC 16.8*  --  18.2*  --   --  20.4*  --    HGB 12.4*   < > 11.3*   < > 11.3* 11.1*  11.4* 10.7*   HCT 33.7*  --  31.9*  --   --  32.1*  --    MCV 84.3  --  84.9  --   --  85.4  --      --  229  --   --  297  --     < > = values in this interval not displayed.      BMP:   Recent Labs     12/27/21  0553 12/27/21  0754 12/27/21  2350 12/28/21  0537 12/28/21  7055 12/28/21  1721 12/29/21  0003   *  --   --  134*  --   --   --    K 4.7  --   --  5.5*  --   --   --    CL 92*  --   --  91*  --   --   --    CO2 17*  --   --  17*  --   --   --    BUN 95*  --   --  110*  --   --   --    CREATININE 8.24*   < >   < > 10.12* 13.6* 13.0* 10.2*    < > = values in this interval not displayed. LIVER PROFILE:   Recent Labs     12/27/21  0553 12/28/21  0537   AST 56* <5   ALT 71* 56*   BILITOT 0.3 0.3   ALKPHOS 120* 127*     PT/INR:   Recent Labs     12/28/21 1952   PROTIME 14.5   INR 1.1     APTT:   Recent Labs     12/28/21 1952   APTT 63.1*     UA:No results for input(s): NITRITE, COLORU, PHUR, LABCAST, WBCUA, RBCUA, MUCUS, TRICHOMONAS, YEAST, BACTERIA, CLARITYU, SPECGRAV, LEUKOCYTESUR, UROBILINOGEN, BILIRUBINUR, BLOODU, GLUCOSEU, AMORPHOUS in the last 72 hours. Invalid input(s): Neri Krishnamurthy    Cultures:  Negative so far  Films:  CXR reviewed by me and it showed bilateral groundglass infiltrates      Assessment:   This is a critically ill patient at risk of deterioration / death , needing close ICU monitoring and intervention due to below noted problems   · Acute hypoxic and hypercapnic respiratory failure  · Severe COVID-19 pneumonia  · Septic shock, patient back on Levophed  · Combined metabolic and respiratory acidosis  · Acute kidney injury, on dialysis  · Abnormal LFT  · Acute DVT, possible PE, unable to do testing at this point      Recommendation  · Vent support lung protective strategy  · head of the bed 30°  · Wean off Nimbex  · Sedation with combination propofol and fentanyl target R ASS of 0 to -1, requiring Versed for adequate sedation, will wean off slowly  · Watch for ICU delirium: TV on, natural light, avoid benzos, pain control, early mobility, and family engagement  · PUD prophylaxis, currently on Protonix twice daily due to prior GI bleed  · Heparin drip  · Target blood sugar 140-180  · Dexamethasone 6 mg daily for 10 days  · Received Actemra  · Did not receive remdesivir due to abnormal LFT and renal failure  · Levophed to maintain mean arterial pressure 65-70  · Continue Reglan  · Dialysis per nephrology, consider daily dialysis or CRRT  · Continue tube feed  · Watch for GI bleed he did have a bleed when he was on heparin drip empirically before             Due to the immediate potential for life-threatening deterioration due to acute respiratory failure I spent 35  minutes providing critical care.  This time is excluding time spent performing procedures.           Electronically signed by Daryn Frank MD,  [facility] ,on 12/29/2021 at 7:58 AM

## 2021-12-29 NOTE — PROGRESS NOTES
Nephrology Progress Note    1. SUREKHA: baseline function unknown, 2/2 hemodynamic instability, baseline unknown, nml function years ago however now presented Scr in the 2s, started on HD 12/24, catheter changed (12/28)  2. Acute hypoxic/hypercarbic respiratory failure: 2/2 covid-19 PNA, intubated  3. Shock: on pressors   4. Hyperkalemia  5. Metabolic acidosis: 2/2 lactic acidosis and renal failure  6. Hypoalbuminemia  7.  Transaminitis      Plan:  - continue IHD TTS   - will attempt another HD session today for 2 hours, opposing catheter removed   - may consider CRRT if continue to have issues        Patient Active Problem List:     Acute respiratory failure with hypoxia (HCC)      Subjective:  Admit Date: 12/22/2021    Interval History: back on levo as BP low yesterday, remains intubated, dialyzed yesterday for 2 hours w/ 1.5 l taken off, did have poor flows w/ this new catheter as well, found to have BLE DVTs, hep SQ changed to heparin gtt, drop in Hb     Medications:  Scheduled Meds:   insulin lispro  0-12 Units SubCUTAneous Q6H    metoclopramide  10 mg IntraVENous Q8H    meropenem  500 mg IntraVENous Q24H    pantoprazole  40 mg IntraVENous BID    chlorhexidine  15 mL Mouth/Throat BID    sennosides-docusate sodium  2 tablet Oral BID    polyethylene glycol  17 g Oral Daily    sodium chloride flush  5-40 mL IntraVENous 2 times per day    dexamethasone  6 mg IntraVENous Q24H     Continuous Infusions:   heparin 25,000 units in 0.9% sodium chloride 250 mL infusion 8 Units/kg/hr (12/28/21 2144)    midazolam 2 mg/hr (12/28/21 1200)    cisatracurium (NIMBEX) infusion 1.202 mcg/kg/min (12/28/21 2314)    norepinephrine 2 mcg/min (12/29/21 0816)    dextrose      sodium chloride 25 mL (12/27/21 1144)    fentaNYL (SUBLIMAZE) infusion 175 mcg/hr (12/29/21 0428)    propofol 38 mcg/kg/min (12/29/21 0210)       CBC:   Recent Labs     12/28/21  0537 12/28/21  0909 12/28/21  1721 12/28/21  1721 12/29/21  0003 12/29/21  0754   WBC 18.2*  --  20.4*  --   --   --    HGB 11.3*   < > 11.1*  11.4*   < > 10.7* 8.2*     --  297  --   --   --     < > = values in this interval not displayed. CMP:    Recent Labs     12/27/21  0553 12/27/21  0754 12/28/21  0537 12/28/21  0909 12/28/21  1721 12/29/21  0003 12/29/21  0754   *  --  134*  --   --   --   --    K 4.7  --  5.5*  --   --   --   --    CL 92*  --  91*  --   --   --   --    CO2 17*  --  17*  --   --   --   --    BUN 95*  --  110*  --   --   --   --    CREATININE 8.24*   < > 10.12*   < > 13.0* 10.2* 9.5*   GLUCOSE 227*  --  171*  --   --   --   --    CALCIUM 6.6*  --  6.7*  --   --   --   --    LABGLOM 6.7*   < > 5.3*   < > 4* 5* 6*    < > = values in this interval not displayed. Troponin:   No results for input(s): TROPONINI in the last 72 hours. BNP: No results for input(s): BNP in the last 72 hours. INR:   Recent Labs     12/28/21 1952   INR 1.1     Lipids: No results for input(s): CHOL, LDLDIRECT, TRIG, HDL, AMYLASE, LIPASE in the last 72 hours. Liver:   Recent Labs     12/28/21  0537   AST <5   ALT 56*   ALKPHOS 127*   PROT 5.6*   LABALBU 2.7*   BILITOT 0.3     Iron:    Recent Labs     12/28/21  0535   FERRITIN 1,783*     Urinalysis: No results for input(s): UA in the last 72 hours.     Objective:  Vitals: /64   Pulse 104   Temp 97.7 °F (36.5 °C)   Resp (!) 32   Ht 5' 8\" (1.727 m)   Wt 271 lb 2.7 oz (123 kg)   SpO2 94%   BMI 41.23 kg/m²    Wt Readings from Last 3 Encounters:   12/28/21 271 lb 2.7 oz (123 kg)   08/21/18 254 lb (115.2 kg)      24HR INTAKE/OUTPUT:      Intake/Output Summary (Last 24 hours) at 12/29/2021 0831  Last data filed at 12/28/2021 2224  Gross per 24 hour   Intake 1810.57 ml   Output 1900 ml   Net -89.43 ml        General: intubated, sedated  HEENT: normocephalic, atraumatic, ETT in place  Lungs: intubated, on vent, coarse breath sounds  Heart: regular rate and rhythm, no murmurs or rubs  Abdomen: soft, non-tender, non-distended  Ext: no cyanosis, ++ peripheral edema  Neuro: sedated        Electronically signed by Cooper Luna MD, MD

## 2021-12-30 NOTE — CONSULTS
Amy Austin has referral meeting with pt's spouse Jeanine Maya at [phone] today in ICU. Assessment will still need completed by [facility] D[facility] RN. [facility] [address] updated to spouse's decision and plan.

## 2021-12-30 NOTE — FLOWSHEET NOTE
Pt sedated, paralyzed, ventilated. Nimbex discontinued. Pt had large liquid stool. Abdomen firm to palpation. Anuric.

## 2021-12-30 NOTE — FLOWSHEET NOTE
12/30/21 1029   Vital Signs   BP (!) 111/50   Temp 98.3 °F (36.8 °C)   Pulse 88   Resp 26   SpO2 94 %   Weight 268 lb 15.4 oz (122 kg)   Weight Method Estimated; Bed scale   Percent Weight Change -1.85   Pain Assessment   Pain Assessment CPOT   Pain Level 0   Post-Hemodialysis Assessment   Post-Treatment Procedures Blood returned;Catheter capped, clamped and heparinized x 2 ports   Machine Disinfection Process Exterior Machine Disinfection   Dialyzer Clearance Heavily streaked   Duration of Treatment (minutes) 120 minutes   Heparin amount administered during treatment (units) 0 units   Hemodialysis Intake (ml) 400 ml   Hemodialysis Output (ml) 2900 ml   NET Removed (ml) 2500 ml   Tolerated Treatment Good   Patient Response to Treatment Pt tolerated tx well. Fluid balance -2500ml. Profile A-B maintained throughout tx. CVC maintains to have poor flows. BFR range 350-250. Elevated AP noted throughout tx. BFR decreased accordingly. MD aware. Report given to ICU RN.  PT stable   Bilateral Breath Sounds Diminished   Edema Generalized   Edema Generalized +1

## 2021-12-30 NOTE — PROGRESS NOTES
Infectious Diseases Inpatient Progress Note          HISTORY OF PRESENT ILLNESS:  Follow up critical COVID-19 with septic shock, severe hypoxia requiring intubation, currently in ICU,ventilated , has bilateral lower extremity deep vein thrombosis. No hypotension or pressors. Tolerating tube feeding. Positive liquid stools. on IV Vanco and meropenem, well tolerated  Acute kidney injury, worsening  Negative blood and urine cultures  Highly elevated D-dimer  Persistent leukocytosis and anemia  Remains on heparin drip  Negative blood cultures  Positive facial and nose ulcers  Current Medications:     carboxymethylcellulose PF  2 drop Both Eyes TID    insulin lispro  0-12 Units SubCUTAneous Q6H    metoclopramide  10 mg IntraVENous Q8H    meropenem  500 mg IntraVENous Q24H    pantoprazole  40 mg IntraVENous BID    chlorhexidine  15 mL Mouth/Throat BID    sennosides-docusate sodium  2 tablet Oral BID    polyethylene glycol  17 g Oral Daily    sodium chloride flush  5-40 mL IntraVENous 2 times per day    dexamethasone  6 mg IntraVENous Q24H       Allergies:  Pcn [penicillins]      Review of Systems  ROS  unable to provide ROS because of sedation      Physical Exam  Vitals:    12/30/21 1000 12/30/21 1015 12/30/21 1022 12/30/21 1029   BP:    (!) 111/50   Pulse: 91 90 90 88   Resp:    26   Temp:    98.3 °F (36.8 °C)   TempSrc:       SpO2:    94%   Weight:    268 lb 15.4 oz (122 kg)   Height:         General Appearance: sedated, non responsive to verbals  Skin: warm and dry, no rash.    Head: normocephalic and atraumatic  Eyes:  anicteric sclerae and skin  Intact central lines  Positive nodes and facial ulcers  Lungs: Bilateral minimal basilar rales  Heart: nl S1/S2, no murmur  Abdomen: soft, no distention or rigidity  NEUROLOGICAL: Does not follow any commands  Bilateral legs swelling   [place] with low urine output, kristine        DATA:    Lab Results   Component Value Date    WBC 17.9 (H) 12/30/2021    HGB 9.0 (L) 12/30/2021    HCT 26.0 (L) 12/30/2021    MCV 85.3 12/30/2021     12/30/2021     Lab Results   Component Value Date    CREATININE 9.5 (HH) 12/30/2021     (HH) 12/30/2021     (L) 12/30/2021    K 5.2 (H) 12/30/2021    CL 89 (L) 12/30/2021    CO2 15 (L) 12/30/2021       Hepatic Function Panel:  Lab Results   Component Value Date    ALKPHOS 139 12/30/2021    ALT 49 12/30/2021    AST 44 12/30/2021    PROT 5.5 12/30/2021    BILITOT 0.4 12/30/2021    LABALBU 2.8 12/30/2021       Microbiology:   No results for input(s): BC in the last 72 hours. No results for input(s): Juan Scarbro in the last 72 hours. No results for input(s): LABURIN in the last 72 hours. No results for input(s): WNDABS in the last 72 hours. No results for input(s): CULTRESP in the last 72 hours. No results for input(s): PH, PO2, PCO2, HCO3, BE, O2SAT in the last 72 hours. XR CHEST (SINGLE VIEW FRONTAL)    Result Date: 12/24/2021  There are bilateral alveolar opacities , infiltrates worrisome for viral COVID-19 pneumonia. CT HEAD WO CONTRAST    Result Date: 12/23/2021  Impression: Pansinusitis. All CT scans at this facility use dose modulation, iterative reconstruction, and/or weight based dosing when appropriate to reduce radiation dose to as low as reasonably achievable. XR CHEST PORTABLE    Result Date: 12/25/2021  Stable exam since yesterday. XR CHEST PORTABLE    Result Date: 12/23/2021  LEFT INTERNAL JUGULAR APPROACH CENTRAL VENOUS CATHETER PLACED IN EXPECTED POSITION. OTHERWISE, STABLE CHEST FROM YESTERDAY. XR CHEST PORTABLE    Result Date: 12/22/2021  Diffuse bilateral airspace opacities may represent pulmonary edema or pneumonia. XR CHEST PORTABLE    Result Date: 12/22/2021  There is severe increased pulmonary markings throughout both lungs worrisome for fluid overload versus viral infiltrates, pneumonia.      XR CHEST ABDOMEN NG PLACEMENT    Result Date: 12/23/2021  ENDOTRACHEAL AND OROGASTRIC TUBES IN EXPECTED POSITIONS. US RETROPERITONEAL LIMITED    Result Date: 12/23/2021  ESSENTIALLY NEGATIVE LIMITED RENAL ULTRASOUND.      US DUP LOWER EXTREMITIES BILATERAL VENOUS    Result Date: 12/28/2021  POSITIVE FOR THROMBUS WITHIN THE RIGHT POSTERIOR TIBIAL VEIN AND LEFT POSTERIOR TIBIAL VEIN    D-dimer=10.67 yesterday  Ferritin is trending down  Blood cultures have been negative    IMPRESSION:    · Septic shock with persistent leukocytosis  · critical COVID 19 pneumonia  · Acute respiratory failure with hypoxia  · Acute kidney injury  · Hypercoagulable state with bilateral lower extremity acute DVT  ·     Patient Active Problem List   Diagnosis    Acute respiratory failure with hypoxia (HCC)    Pneumonia due to COVID-19 virus    Acute kidney injury ([address]    Septic shock ([address]    Acute deep vein thrombosis (DVT) of both lower extremities ([address]    Encounter for hospice care discussion    Goals of care, counseling/discussion    Advanced care planning/counseling discussion       PLAN:  · IV meropenem for now  · Vent support and weaning as tolerated  · Vasopressor support and weaning as tolerated  · Follow-up D-dimer and ferritin  · Follow-up CBC BMP and chest x-ray  · Follow-up blood cultures  · Heparin and Decadron as ordered  Patient has a guarded prognosis      Peggy Kyle MD

## 2021-12-30 NOTE — PROGRESS NOTES
Progress Note  Date:2021       Room:[place] [address]  Patient Sherman Bynum     YOB: 1960     Age:61 y.o.    ROS: could not be obtained bc pt is intuabted    Subjective    Subjective     Review of Systems    Objective         Vitals Last 24 Hours:  TEMPERATURE:  Temp  Av.4 °F (36.9 °C)  Min: 98.3 °F (36.8 °C)  Max: 98.5 °F (36.9 °C)  RESPIRATIONS RANGE: Resp  Av.1  Min: 18  Max: 38  PULSE OXIMETRY RANGE: SpO2  Av.9 %  Min: 92 %  Max: 95 %  PULSE RANGE: Pulse  Av  Min: 87  Max: 98  BLOOD PRESSURE RANGE: Systolic (23EFB), TFK:375 , Min:111 , XSF:090   ; Diastolic (11PSP), IF, Min:50, Max:51    I/O (24Hr): Intake/Output Summary (Last 24 hours) at 2021 1328  Last data filed at 2021 1029  Gross per 24 hour   Intake 2935 ml   Output 2900 ml   Net 35 ml     Objective:  General Appearance:  Ill-appearing. Vital signs: (most recent): Blood pressure (!) 111/50, pulse 88, temperature 98.3 °F (36.8 °C), resp. rate 26, height 5' 8\" (1.727 m), weight 268 lb 15.4 oz (122 kg), SpO2 94 %. HEENT: (+ ET tube)    Lungs: There are decreased breath sounds. Heart: S1 normal and S2 normal.    Abdomen: Abdomen is soft. Pulses: Distal pulses are intact. Skin:  Warm and dry. Labs/Imaging/Diagnostics    Labs:  CBC:  Recent Labs     21  0537 21  0909 21  1721 21  0003 21  0001 21  0520 21  1101   WBC 18.2*  --  20.4*  --   --  17.9*  --    RBC 3.76*  --  3.76*  --   --  3.05*  --    HGB 11.3*   < > 11.1*  11.4*   < > 8.3* 8.6* 9.0*   HCT 31.9*  --  32.1*  --   --  26.0*  --    MCV 84.9  --  85.4  --   --  85.3  --    RDW 15.4*  --  15.8*  --   --  15.7*  --      --  297  --   --  263  --     < > = values in this interval not displayed.      CHEMISTRIES:  Recent Labs     21  0537 21  0909 21  0001 21  0600 21  1101   *  --   --  129*  --    K 5.5*  --   --  5.2*  --    CL 91*  --   -- with Protonix. Keep fingerstick 1 40-1 80. No active GI bleeding noted. Continue Actemra. possible needing IVC filter if pt contiues to have GI bleed. hgb is stable. 12/29: Patient was seen and evaluated. On pressors to keep map above 65. Continue with heparin drip. Continue with Reglan, HD as per nephrology. Continue tube feeds. Watch for GI bleed. 12/30: Vent support. Wean off FiO2 as tolerated. Continue with steroids. Patient did not get remdesivir due to renal failure and elevated LFTs. Follow-up hospice. Patient getting HD with fluid removal ,continue with coagulation for DVT.   Electronically signed by Roberto Woodard MD on 12/28/21 at 3:06 PM EST

## 2021-12-30 NOTE — PROGRESS NOTES
1130 Patient supined in prep for jagdish and central line placement  1245 Stirum & central line placed  1300 placement verified by xray

## 2021-12-30 NOTE — PROGRESS NOTES
Palliative Care Progress Note  Patient: Rohini Schaeffer  Gender: male  YOB: 1960  Unit/Bed: IC05/IC05-01  CodeStatus: DNR-CCA  Inpatient Treatment Team: Treatment Team: Attending Provider: Madan Camarena MD; Consulting Physician: Danyell Rolle MD; Consulting Physician: Omega Lombardi MD; Consulting Physician: Stephenie Jorgensen MD; : Annette Santos, RN; Utilization Reviewer: Estiven Hardy RN; Respiratory Therapist (Day): Pa Ochoa RCP; Utilization Reviewer: Yvonne Clarke RN; Registered Nurse: Risa Mederos RN  Admit Date:  12/22/2021    Chief Complaint: Shortness of breath    History of Presenting Illness:      Rohini Schaeffer is a 64 y.o. male on hospital day 8 with a history of testicular cancer with removal.      Patient presented to the ER from home one week after coming back COVID positive with shortness of breath. Patient was hypoxic on arrival to the ER requiring intubation. His BP was also 260/140. Patient is not vaccinated. Patient admitted to the ICU for respiratory failure due to severe COVID-19 pneumonia. Palliative care consulted for goals of care, code status discussion, family support, and symptom management. Patient is currently intubated and sedated. FiO2 is 70% and PEEP is at 12. Patient also on paralytic. Remains on levophed for BP support. Currently on heparin gtt for bilateral lower extremity DVTs. Patient with acute kidney failure requiring new HD. Most recent notable labs: pH 7.172, pO2 arterial 91, O2 Sat arterial 94, WBC 20.4, Kcl 5.5, , Creat 10.12, GFR 5.3, albumin 2.7, Alk Phos 127, ALT 56.    12/30/21:  Patient remains intubated and sedated. Remains on 70% FiO2. No change in current condition. Most recent notable labs: pO2, Arterial 84, O2 sat, Arterial 95, , Creat 11.03, GFR 4.8, Na+ 129, Kcl 5.2, albumin 2.8, Alk Phos 2.8, ALT 49, AST 44, Hgb 8.6.       Review of Systems:       Review of Systems   Unable to perform ROS: Intubated Physical Examination:       BP (!) 111/50   Pulse 88   Temp 98.3 °F (36.8 °C)   Resp 26   Ht 5' 8\" (1.727 m)   Wt 268 lb 15.4 oz (122 kg)   SpO2 94%   BMI 40.90 kg/m²    Physical Exam  Constitutional:       Appearance: He is ill-appearing. Interventions: He is sedated and intubated. HENT:      Head: Normocephalic and atraumatic. Nose: No rhinorrhea. Eyes:      General:         Right eye: No discharge. Left eye: No discharge. Conjunctiva/sclera: Conjunctivae normal.   Cardiovascular:      Rate and Rhythm: Normal rate and regular rhythm. Pulmonary:      Effort: He is intubated. Breath sounds: Decreased breath sounds present. No wheezing or rales. Abdominal:      General: Bowel sounds are normal. There is no distension. Palpations: Abdomen is soft. Tenderness: There is no abdominal tenderness. Musculoskeletal:         General: No deformity. Cervical back: Neck supple. Right lower leg: No edema. Left lower leg: No edema. Skin:     General: Skin is warm and dry. Comments: Wounds to right arm. Neurological:      Mental Status: He is unresponsive. Psychiatric:         Speech: He is noncommunicative. Cognition and Memory: Cognition is impaired. Allergies:       Allergies   Allergen Reactions    Pcn [Penicillins] Shortness Of Breath and Swelling     Was given as a child for Bee sting: swelling and SoB       Medications:      Current Facility-Administered Medications   Medication Dose Route Frequency Provider Last Rate Last Admin    sodium chloride 0.9 % infusion             sodium chloride 0.9 % infusion             albumin human 25 % IV solution 25 g  25 g IntraVENous Daily PRN Alessandra Alexander MD        heparin (porcine) injection 1,000 Units  1,000 Units Intercatheter PRN Alessandra Alexander MD        sodium chloride 0.9 % infusion             heparin (porcine) injection 4,000 Units  4,000 Units IntraVENous PRN Malik Bennett CIERA Peraza CNP        heparin (porcine) injection 2,000 Units  2,000 Units IntraVENous PRN CIERA Heredia CNP   2,000 Units at 12/29/21 2309    heparin (porcine) 25,000 Units in sodium chloride 0.9 % 250 mL infusion  8 Units/kg/hr IntraVENous Continuous CIERA Heredia CNP 12.4 mL/hr at 12/29/21 2309 10.02 Units/kg/hr at 12/29/21 2309    insulin lispro (HUMALOG) injection vial 0-12 Units  0-12 Units SubCUTAneous Q6H Leonidas Floyd MD   2 Units at 12/29/21 2352    metoclopramide (REGLAN) injection 10 mg  10 mg IntraVENous Q8H Leonidas Floyd MD   10 mg at 12/30/21 1136    meropenem (MERREM) 500 mg IVPB (mini-bag)  500 mg IntraVENous Q24H Leonidas Floyd  mL/hr at 12/30/21 1140 500 mg at 12/30/21 1140    pantoprazole (PROTONIX) injection 40 mg  40 mg IntraVENous BID Leonidas Floyd MD   40 mg at 12/30/21 1136    labetalol (NORMODYNE;TRANDATE) injection 10 mg  10 mg IntraVENous Q3H PRN Leonidas Floyd MD        norepinephrine (LEVOPHED) 16 mg in dextrose 5% 250 mL infusion  2-100 mcg/min IntraVENous Continuous Dixon Marty Sedar, DO   Stopped at 12/29/21 2015    chlorhexidine (PERIDEX) 0.12 % solution 15 mL  15 mL Mouth/Throat BID CIERA Heredia CNP   15 mL at 12/30/21 1136    sennosides-docusate sodium (SENOKOT-S) 8.6-50 MG tablet 2 tablet  2 tablet Oral BID CIERA Heredia CNP   2 tablet at 12/30/21 1135    polyethylene glycol (GLYCOLAX) packet 17 g  17 g Oral Daily CIERA Heredia CNP   17 g at 12/30/21 1135    glucose (GLUTOSE) 40 % oral gel 15 g  15 g Oral PRN CIERA Heredia - CNP        dextrose 50 % IV solution  12.5 g IntraVENous PRN CIERA Heredia CNP        glucagon (rDNA) injection 1 mg  1 mg IntraMUSCular PRN ICERA Heredia CNP        dextrose 5 % solution  100 mL/hr IntraVENous PRN CIERA Heredia CNP        sodium chloride flush 0.9 % injection 5-40 mL  5-40 mL IntraVENous 2 times per day CIERA Heredia CNP   10 mL at 21 1018    sodium chloride flush 0.9 % injection 5-40 mL  5-40 mL IntraVENous PRN Chloe Chacon APRN - CNP        0.9 % sodium chloride infusion  25 mL IntraVENous PRN CIERA Albarran -  mL/hr at 21 1144 25 mL at 21 1144    dexamethasone (DECADRON) injection 6 mg  6 mg IntraVENous Q24H Costa Galeana MD   6 mg at 21 1705    fentaNYL (SUBLIMAZE) 1,000 mcg in sodium chloride 0.9 % 100 mL infusion  12.5-200 mcg/hr IntraVENous Continuous Cabrera MD Riddhi 7.5 mL/hr at 21 0438 75 mcg/hr at 21 0438    propofol injection  5-50 mcg/kg/min IntraVENous Titrated Windsor MD Yamilet 27.6 mL/hr at 21 0948 40 mcg/kg/min at 21 0948    hydrALAZINE (APRESOLINE) injection 10 mg  10 mg IntraVENous Q6H PRN Roberto Woodard MD   10 mg at 21 1126    0.9 % sodium chloride bolus  30 mL IntraVENous PRN Roberto Woodard MD        midazolam PF (VERSED) injection 2 mg  2 mg IntraVENous Q2H PRN Gearldean Pap Sedar, DO   4 mg at 21 1201    iopamidol (ISOVUE-300) 61 % injection 100 mL  100 mL IntraVENous ONCE PRN Roberto Woodard MD           History: PMHx:  Past Medical History:   Diagnosis Date    History of testicular cancer        PSHx:  Past Surgical History:   Procedure Laterality Date    TESTICLE REMOVAL Left     with LN dissection (Seperarate surgery )        Social Hx:  Social History     Socioeconomic History    Marital status:      Spouse name: None    Number of children: None    Years of education: None    Highest education level: None   Occupational History    None   Tobacco Use    Smoking status: Former Smoker     Packs/day: 1.50     Years: 30.00     Pack years: 45.00     Types: Cigarettes     Start date:      Quit date:      Years since quittin.0    Smokeless tobacco: Never Used   Vaping Use    Vaping Use: Never used   Substance and Sexual Activity    Alcohol use:  Yes     Alcohol/week: 7.0 standard drinks     Types: 7 Cans WBC 17.9 12/30/2021    RBC 3.05 12/30/2021    HGB 9.0 12/30/2021    HCT 26.0 12/30/2021     12/30/2021    MCV 85.3 12/30/2021    MCH 28.1 12/30/2021    MCHC 32.9 12/30/2021    RDW 15.7 12/30/2021    NRBC 3 12/30/2021    BANDSPCT 4 12/30/2021    METASPCT 4 12/30/2021    LYMPHOPCT 1.0 12/30/2021    MONOPCT 2.7 12/30/2021    MYELOPCT 1 12/27/2021    BASOPCT 0.1 12/30/2021    MONOSABS 0.5 12/30/2021    LYMPHSABS 0.2 12/30/2021    EOSABS 0.0 12/30/2021    BASOSABS 0.0 12/30/2021     CMP:    Lab Results   Component Value Date     12/30/2021    K 5.2 12/30/2021    K 5.9 12/25/2021    CL 89 12/30/2021    CO2 15 12/30/2021     12/30/2021    CREATININE 9.5 12/30/2021    CREATININE 11.03 12/30/2021    GFRAA 7 12/30/2021    LABGLOM 6 12/30/2021    GLUCOSE 135 12/30/2021    PROT 5.5 12/30/2021    LABALBU 2.8 12/30/2021    CALCIUM 7.2 12/30/2021    BILITOT 0.4 12/30/2021    ALKPHOS 139 12/30/2021    AST 44 12/30/2021    ALT 49 12/30/2021     BMP:    Lab Results   Component Value Date     12/30/2021    K 5.2 12/30/2021    K 5.9 12/25/2021    CL 89 12/30/2021    CO2 15 12/30/2021     12/30/2021    LABALBU 2.8 12/30/2021    CREATININE 9.5 12/30/2021    CREATININE 11.03 12/30/2021    CALCIUM 7.2 12/30/2021    GFRAA 7 12/30/2021    LABGLOM 6 12/30/2021    GLUCOSE 135 12/30/2021     TSH: No results found for: TSH  Vitamin B12 and Folate: No components found for: FOLIC,  G50  Urinalysis: No results found for: NITRU, [address] Kemp, Kelley Willoughby Kisha [address], RBCUA, BLOODU, SPECGRAV, GLUCOSEU        FUNCTIONAL ADL´S:     Independent: [  ] Eating, [   ] Dressing, [   ] Transferring, [   ] Robert He, [   ] Kendra Devries, [   ] Continence  Dependent   : [ x ] Eating, [ x ] Dressing, [ x ] Transferring, [ x ] Toileting, [ x ] Bathing, [ x ] Continence  W. assistant : [  ] Eating, [   ] Dressing, [   ] Transferring, [   ] Robert He, [   ] Kendra Devries, [   ] Continence    Radiology: Reviewed      XR CHEST PORTABLE    Result Date: from the inguinal ligaments to the popliteal fossa was performed. There are no findings of deep venous thrombus in the visualized vessels of the left or right  lower extremity from the common femoral vein to the popliteal. However, the findings are positive for thrombus within the right posterior tibial vein and left posterior tibial vein. POSITIVE FOR THROMBUS WITHIN THE RIGHT POSTERIOR TIBIAL VEIN AND LEFT POSTERIOR TIBIAL VEIN         Assessment and plan:    21:  Updated nurse on plan for hospice meeting with family. Spoke with spouse earlier today and received updated cell phone number. Provided Hany Vu with [employer] numbers and discussed setting up meeting today. Plan is for meeting at 2 p.m. with spouse. 1. Encounter for hospice discussion  Patient's prognosis is poor. He is currently in multiorgan failure due to severe COVID-19 pneumonia. Patient is appropriate for hospice and terminal wean at this time. Spoke with wife and discussed patient's condition. She would like to meet with hospice with plans to proceed with comfort measures. Case discussed with patient's RN and intensivist. RN will reach out to family regarding visitation at end of life. Reached out to hospice to set up meeting with family. -Advance Care Planning  Discussed goals of care with patient. Explained in extensive detail nuances between full code, DNR CCA and DNR CC. Patient's spouse has made the decision for patient to be [facility]. Spouse, brody Giraldo, is patient's next of kin. -Goals of Care Discussion:  Disease process and goals of treatment were discussed in basic terms. Chetan's goal is to optimize available comfort care measures. We discussed all care options contingent on treatment response and QOL. Much active listening, presence, and emotional support were given. Patient is being treated for multiple medical conditions this admission includin.  Acute hypoxic and hypercapnic respiratory failure due to severe COVID pneumonia  2. HTN emergency  3. SUREKHA on new HD  4. Hyperglycemia  5. Hypercoagulable state with bilateral lower extremity DVT  6. Obesity  7. Septic shock  8. Metabolic and respiratory acidosis  9. Abnormal LFT  10. Hyperkalemia  11. Hypoalbuminemia    Total Time: 25 minutes with >50% spent counseling/care coordination.     Electronically signed by CIERA Enriquez CNP on 12/30/2021 at 12:00 PM

## 2021-12-30 NOTE — PROGRESS NOTES
Nephrology Progress Note    1. SUREKHA: baseline function unknown, 2/2 hemodynamic instability, baseline unknown, nml function years ago however now presented Scr in the 2s, started on HD 12/24, catheter changed (12/28)  2. Acute hypoxic/hypercarbic respiratory failure: 2/2 covid-19 PNA, intubated  3. Shock: on pressors   4. Hyperkalemia  5. Metabolic acidosis: 2/2 lactic acidosis and renal failure  6. Hypoalbuminemia  7. Transaminitis      Plan:  - continue IHD TTS -likely Sunday this week  -Hemodialysis catheter in right IJ  -Increase fluid removal to 3 L today with dialysis  -Retacrit for anemia       Patient Active Problem List:     Acute respiratory failure with hypoxia (HCC)      Subjective:  Admit Date: 12/22/2021    Interval History: Labs pending today. Patient starting dialysis. Ultrafiltration ordered at 1 L. Will increase to 3 L. Currently off Levophed.     Medications:  Scheduled Meds:   lactulose  20 g Oral TID    insulin lispro  0-12 Units SubCUTAneous Q6H    metoclopramide  10 mg IntraVENous Q8H    meropenem  500 mg IntraVENous Q24H    pantoprazole  40 mg IntraVENous BID    chlorhexidine  15 mL Mouth/Throat BID    sennosides-docusate sodium  2 tablet Oral BID    polyethylene glycol  17 g Oral Daily    sodium chloride flush  5-40 mL IntraVENous 2 times per day    dexamethasone  6 mg IntraVENous Q24H     Continuous Infusions:   sodium chloride      sodium chloride      heparin 25,000 units in 0.9% sodium chloride 250 mL infusion 10.02 Units/kg/hr (12/29/21 2309)    midazolam 2 mg/hr (12/28/21 1200)    cisatracurium (NIMBEX) infusion 1.5 mcg/kg/min (12/29/21 0815)    norepinephrine Stopped (12/29/21 2015)    dextrose      sodium chloride 25 mL (12/27/21 1144)    fentaNYL (SUBLIMAZE) infusion 75 mcg/hr (12/30/21 0438)    propofol 40 mcg/kg/min (12/30/21 0608)       CBC:   Recent Labs     12/28/21  1721 12/29/21  0003 12/30/21  0001 12/30/21  0520   WBC 20.4*  --   --  17.9*   HGB 11.1* 11.4*   < > 8.3* 8.6*     --   --  263    < > = values in this interval not displayed. CMP:    Recent Labs     12/28/21  0537 12/28/21  0909 12/29/21  0754 12/29/21  1434 12/30/21  0001   *  --   --   --   --    K 5.5*  --   --   --   --    CL 91*  --   --   --   --    CO2 17*  --   --   --   --    *  --   --   --   --    CREATININE 10.12*   < > 9.5* 11.8* 11.1*   GLUCOSE 171*  --   --   --   --    CALCIUM 6.7*  --   --   --   --    LABGLOM 5.3*   < > 6* 4* 5*    < > = values in this interval not displayed. Troponin:   No results for input(s): TROPONINI in the last 72 hours. BNP: No results for input(s): BNP in the last 72 hours. INR:   Recent Labs     12/28/21 1952   INR 1.1     Lipids: No results for input(s): CHOL, LDLDIRECT, TRIG, HDL, AMYLASE, LIPASE in the last 72 hours. Liver:   Recent Labs     12/28/21  0537   AST <5   ALT 56*   ALKPHOS 127*   PROT 5.6*   LABALBU 2.7*   BILITOT 0.3     Iron:    Recent Labs     12/28/21  0535   FERRITIN 1,783*     Urinalysis: No results for input(s): UA in the last 72 hours.     Objective:  Vitals: /64   Pulse 92   Temp 98.5 °F (36.9 °C) (Oral)   Resp 18   Ht 5' 8\" (1.727 m)   Wt 271 lb 2.7 oz (123 kg)   SpO2 94%   BMI 41.23 kg/m²    Wt Readings from Last 3 Encounters:   12/28/21 271 lb 2.7 oz (123 kg)   08/21/18 254 lb (115.2 kg)      24HR INTAKE/OUTPUT:      Intake/Output Summary (Last 24 hours) at 12/30/2021 0824  Last data filed at 12/30/2021 0600  Gross per 24 hour   Intake 2535 ml   Output --   Net 2535 ml        General: intubated, sedated  HEENT: normocephalic, atraumatic, ETT in place  Lungs: intubated, on vent, coarse breath sounds  Heart: regular rate and rhythm, no murmurs or rubs  Abdomen: soft, non-tender, non-distended  Ext: no cyanosis, ++ peripheral edema  Neuro: sedated        Electronically signed by Trell Saldaña MD, MD

## 2021-12-30 NOTE — PROGRESS NOTES
2100- patient is unnresponsive to any stimuli. Remains on ventilator and sedated. Patient has open in skin on his right arm. No gag present during suctioning. Abdomen distended and firm to palpate. He is anuric. +1 pitting edema on BUE and BLE. 2130- Xa drawn for heparin drip. 2309- adjusted drip according to protocol. 0400- gave chlorhexidine solution bath. Did not change sheet due to BP increasing while being moved. Changed gown. 0630- Xa required no change or bolus.

## 2021-12-30 NOTE — PLAN OF CARE
Problem: Airway Clearance - Ineffective  Goal: Achieve or maintain patent airway  Outcome: Ongoing     Problem: Gas Exchange - Impaired  Goal: Absence of hypoxia  Outcome: Ongoing  Goal: Promote optimal lung function  Outcome: Ongoing     Problem: Breathing Pattern - Ineffective  Goal: Ability to achieve and maintain a regular respiratory rate  Outcome: Ongoing     Problem:  Body Temperature -  Risk of, Imbalanced  Goal: Ability to maintain a body temperature within defined limits  Outcome: Ongoing  Goal: Will regain or maintain usual level of consciousness  Outcome: Ongoing  Goal: Complications related to the disease process, condition or treatment will be avoided or minimized  Outcome: Ongoing     Problem: Isolation Precautions - Risk of Spread of Infection  Goal: Prevent transmission of infection  Outcome: Ongoing     Problem: Nutrition Deficits  Goal: Optimize nutritional status  Outcome: Ongoing     Problem: Risk for Fluid Volume Deficit  Goal: Maintain normal heart rhythm  Outcome: Ongoing  Goal: Maintain absence of muscle cramping  Outcome: Ongoing  Goal: Maintain normal serum potassium, sodium, calcium, phosphorus, and pH  Outcome: Ongoing     Problem: Loneliness or Risk for Loneliness  Goal: Demonstrate positive use of time alone when socialization is not possible  Outcome: Ongoing     Problem: Fatigue  Goal: Verbalize increase energy and improved vitality  Outcome: Ongoing     Problem: Patient Education: Go to Patient Education Activity  Goal: Patient/Family Education  Outcome: Ongoing     Problem: Non-Violent Restraints  Goal: Removal from restraints as soon as assessed to be safe  Outcome: Ongoing  Goal: No harm/injury to patient while restraints in use  Outcome: Ongoing  Goal: Patient's dignity will be maintained  Outcome: Ongoing     Problem: Skin Integrity:  Goal: Will show no infection signs and symptoms  Description: Will show no infection signs and symptoms  Outcome: Ongoing  Goal: Absence of new skin breakdown  Description: Absence of new skin breakdown  Outcome: Ongoing     Problem: Nutrition  Goal: Optimal nutrition therapy  Outcome: Ongoing   Continue to follow plan of care.

## 2021-12-30 NOTE — PROGRESS NOTES
Critical Care Progress Note    2021 11:46 AM    Subjective:   Admit Date: 2021  PCP: Rocky Frey MD    Chief Complaint   Patient presents with    Shortness of Breath     Interval History:  Has been intubated and sedated. PEEP is 10, FiO2 is 70%. Has been off Levophed. UO is 2900. Medications:   Scheduled Meds:   insulin lispro  0-12 Units SubCUTAneous Q6H    metoclopramide  10 mg IntraVENous Q8H    meropenem  500 mg IntraVENous Q24H    pantoprazole  40 mg IntraVENous BID    chlorhexidine  15 mL Mouth/Throat BID    sennosides-docusate sodium  2 tablet Oral BID    polyethylene glycol  17 g Oral Daily    sodium chloride flush  5-40 mL IntraVENous 2 times per day    dexamethasone  6 mg IntraVENous Q24H     Continuous Infusions:   sodium chloride      sodium chloride      sodium chloride      heparin 25,000 units in 0.9% sodium chloride 250 mL infusion 10.02 Units/kg/hr (21)    norepinephrine Stopped (21)    dextrose      sodium chloride 25 mL (21 1144)    fentaNYL (SUBLIMAZE) infusion 75 mcg/hr (21 0438)    propofol 40 mcg/kg/min (21 0948)         Objective:   Vitals:   Temp (24hrs), Av.4 °F (36.9 °C), Min:98.3 °F (36.8 °C), Max:98.5 °F (36.9 °C)    BP (!) 111/50   Pulse 88   Temp 98.3 °F (36.8 °C)   Resp 26   Ht 5' 8\" (1.727 m)   Wt 268 lb 15.4 oz (122 kg)   SpO2 94%   BMI 40.90 kg/m²   I/O:24HR INTAKE/OUTPUT:      Intake/Output Summary (Last 24 hours) at 2021 1146  Last data filed at 2021 1029  Gross per 24 hour   Intake 2935 ml   Output 2900 ml   Net 35 ml     701 -  07  In: 4576 [I.V.:]  Out: -   CVP:         Ventilator Settings:  Vent Mode: AC/VC  Rate Set: 32 bmp   Vt Ordered: 530 mL       PEEP/CPAP: 10   FiO2 : 70 %     Physical Exam:  General appearance - ill appearing   Mental status - intubated and sedated.    Eyes - pupils equal and reactive, extraocular eye movements intact  Nose - normal and patent  Mouth: ETT  Neck - supple, no significant adenopathy  Chest - diminished to auscultation B/L, no wheezes, rales or rhonchi, symmetric air entry  Heart - Normal rate, regular rhythm, normal S1, S2, no murmurs, rubs, clicks or gallops  Abdomen - soft, nontender, nondistended, no masses or organomegaly  Rectal - deferred, not clinically indicated  Neurological - sedated, no focal findings   Musculoskeletal - no joint tenderness, deformity or swelling  Extremities - peripheral pulses normal, no pedal edema, no clubbing or cyanosis  Skin - normal coloration and turgor, no rashes               BMP:    Recent Labs     12/28/21  0537 12/28/21  0909 12/30/21  0600 12/30/21  1101   *  --  129*  --    K 5.5*  --  5.2*  --    CL 91*  --  89*  --    CO2 17*  --  15*  --    *  --  150*  --    CREATININE 10.12*   < > 11.03* 9.5*   GLUCOSE 171*  --  135*  --     < > = values in this interval not displayed. .  MG:3,PHOS:3)@  Ionized Calcium: No results found for: IONCA  CBC:   Recent Labs     12/28/21  1721 12/29/21  0003 12/30/21  0520 12/30/21  1101   WBC 20.4*  --  17.9*  --    HGB 11.1*  11.4*   < > 8.6* 9.0*     --  263  --     < > = values in this interval not displayed. ABG: No results for input(s): PH, PCO2, PO2 in the last 72 hours. Assessment and Plan:       Impression:    - Acute hypoxic and hypercapnic respiratory failure  - Severe COVID-19 pneumonia  - Septic shock, has been off Levophed  - Combined metabolic and respiratory acidosis  - Acute kidney injury, currently on RRT  - Abnormal LFT  - Hyperkalemia  - Acute DVT, likely  PE, unable to do testing at this point             Recommendations:    - Continue close monitoring of hemodynamic and respiratory status in droplet plus isolation   - C/w vent bundle, wean off FiO2. Continue sedation.    - Continue Steroids for 10 days.    - did not receive Remdesivir due to SUREKHA and elevated LFTs  - Glycemic control, maintain FSG between 140-180 mg/dL   - continue anticoagulation.   - Trend inflammatory markers  - attempt to keep on dry side. - strict I/O        Prophylaxis:  Stress ulcer: [] PPI Agent [] H2RA [] Sucralfate [] Other:   VTE: [] Enoxaparin  [] SC Heparin  [] SCD      DNR-CCA      Excluding procedures, the total critical care time caring for this patient with life threatening, unstable organ failure, including direct patient contact, review of medical record, management of life support systems, review of data including imaging and labs, discussions with other team members, patient's family and physicians at least 31 minutes so far today.         Electronically signed by Tenisha Caballero MD on 12/30/2021 at 11:46 AM

## 2021-12-30 NOTE — CONSULTS
This RN called unit to request physician order for hospice vs CNP that is currently in place. Explained to  that [facility] uses referring physician a part of legal parameters for 2 physicians deeming pt having a life expectancy of less than 6 months. 1000 Return TC to unit with 2nd request for correct order. [facility] has staff available to meet with family but can not schedule meeting without correct order.

## 2021-12-31 NOTE — PROGRESS NOTES
Daily Update    From:HOME WITH SPOUSE, INDEP, NO 02, NO CHILDREN     PMH: ETOH, TESTICULAR CA    Anticipated Discharge Date:TBD    Anticipated Discharge Disposition:TBD    Patient Mobility or PT/OT ordered:- ONCE EXTUBATED  CONSULTS: ID, RENAL, PULM, PALL, HOPSICE    Covid Test Date and Result: 12/22++, TESTED + PRIOR TO ER VISIT  29/2.09-->140/9.76; NA->129--133;  K+^ 5.5--5.1;   WBC 12.8--20.4--19.2;  VD: +BLE DVT;  12/24 HD CATH, HD-12/24, 12/26, 12/28, 12/30, 12/31    Barriers to Discharge: SR/VENT 70%, PRONE, PICC, DECADRON, HEPARIN DRIP, REMDES, MERREM, FENT/PROP DRIPS, DNR-CCA, HOSPICE MEETING 12/30 2PM    Risk of Unplanned Readmission:  17   -CMI/ACP DONE

## 2021-12-31 NOTE — PROGRESS NOTES
Critical Care Progress Note    2021 8:59 AM    Subjective:   Admit Date: 2021  PCP: Cesilia Whittaker MD    Chief Complaint   Patient presents with    Shortness of Breath     Interval History:  Has been intubated and sedated. PEEP is 10, FiO2 is 70%. Has been off Levophed. UO is 2900.         21: Continues to be about the same. FiO2 is 70%, PEEP is 10. Has been off Levophed as well. Currently sedated with fentanyl, propofol. Continues to be on IV heparin. Urine output is 2900 cc.     Medications:   Scheduled Meds:   carboxymethylcellulose PF  2 drop Both Eyes TID    insulin lispro  0-12 Units SubCUTAneous Q6H    metoclopramide  10 mg IntraVENous Q8H    meropenem  500 mg IntraVENous Q24H    pantoprazole  40 mg IntraVENous BID    chlorhexidine  15 mL Mouth/Throat BID    sennosides-docusate sodium  2 tablet Oral BID    polyethylene glycol  17 g Oral Daily    sodium chloride flush  5-40 mL IntraVENous 2 times per day    dexamethasone  6 mg IntraVENous Q24H     Continuous Infusions:   heparin 25,000 units in 0.9% sodium chloride 250 mL infusion 12.4 Units/kg/hr (21 0705)    norepinephrine Stopped (21)    dextrose      sodium chloride 100 mL/hr at 21 0800    fentaNYL (SUBLIMAZE) infusion 150 mcg/hr (21 0413)    propofol 50 mcg/kg/min (21 0450)         Objective:   Vitals:   Temp (24hrs), Av.3 °F (36.8 °C), Min:98 °F (36.7 °C), Max:98.5 °F (36.9 °C)    BP (!) 111/50   Pulse 87   Temp 98.5 °F (36.9 °C) (Oral)   Resp 29   Ht 5' 8\" (1.727 m)   Wt 268 lb 15.4 oz (122 kg)   SpO2 94%   BMI 40.90 kg/m²   I/O:24HR INTAKE/OUTPUT:      Intake/Output Summary (Last 24 hours) at 2021 0859  Last data filed at 2021 0500  Gross per 24 hour   Intake 2017 ml   Output 2900 ml   Net -883 ml     12/701 -  07  In: 3503.3 [I.V.:2722.3]  Out: 2900   CVP:         Ventilator Settings:  Vent Mode: AC/VC  Rate Set: 32 bmp   Vt Ordered: 530 mL       PEEP/CPAP: 10   FiO2 : 70 %     Physical Exam:  General appearance - ill appearing   Mental status - intubated and sedated. Eyes - pupils equal and reactive, extraocular eye movements intact  Nose - normal and patent  Mouth: ETT  Neck - supple, no significant adenopathy  Chest - diminished to auscultation B/L, no wheezes, rales or rhonchi, symmetric air entry  Heart - Normal rate, regular rhythm, normal S1, S2, no murmurs, rubs, clicks or gallops  Abdomen - soft, nontender, nondistended, no masses or organomegaly  Rectal - deferred, not clinically indicated  Neurological - sedated, no focal findings   Musculoskeletal - no joint tenderness, deformity or swelling  Extremities - peripheral pulses normal, no pedal edema, no clubbing or cyanosis  Skin - normal coloration and turgor, no rashes               BMP:    Recent Labs     12/30/21  0600 12/30/21  1101 12/31/21  0509 12/31/21  0736   *  --  133*  --    K 5.2*  --  5.1*  --    CL 89*  --  90*  --    CO2 15*  --  17*  --    *  --  140*  --    CREATININE 11.03*   < > 9.76* 11.9*   GLUCOSE 135*  --  119*  --     < > = values in this interval not displayed. .  MG:3,PHOS:3)@  Ionized Calcium: No results found for: IONCA  CBC:   Recent Labs     12/30/21  0520 12/30/21  1101 12/31/21  0508 12/31/21  0736   WBC 17.9*  --  19.2*  --    HGB 8.6*   < > 8.7* 8.1*     --  330  --     < > = values in this interval not displayed. ABG: No results for input(s): PH, PCO2, PO2 in the last 72 hours. Assessment and Plan:       Impression:    - Acute hypoxic and hypercapnic respiratory failure  - Severe COVID-19 pneumonia  - Septic shock, has been off Levophed  - Combined metabolic and respiratory acidosis  - Acute kidney injury, currently on RRT  - Abnormal LFT  - Hyperkalemia, this has been stable. - Acute DVT, likely  PE.               Recommendations:    - Continue close monitoring of hemodynamic and respiratory status in droplet plus isolation   - C/w vent bundle, wean off FiO2. Continue sedation. Can probably drop FiO2 to 60% later today. - Continue Steroids for 10 days. - did not receive Remdesivir due to SUREKHA and elevated LFTs  - Glycemic control, maintain FSG between 140-180 mg/dL   - continue anticoagulation.   - Trend inflammatory markers  - attempt to keep on dry side.   - RRT per nephrology   - strict I/O        Prophylaxis:  Stress ulcer: [] PPI Agent [] H2RA [] Sucralfate [] Other:   VTE: [] Enoxaparin  [] SC Heparin  [] SCD      DNR-CCA      Excluding procedures, the total critical care time caring for this patient with life threatening, unstable organ failure, including direct patient contact, review of medical record, management of life support systems, review of data including imaging and labs, discussions with other team members, patient's family and physicians at least 31 minutes so far today.         Electronically signed by Chacho Bruce MD on 12/31/2021 at 8:59 AM

## 2021-12-31 NOTE — PROGRESS NOTES
2100- patient remains vented and sedated. In semi benavidez position. Does not tolerate moving, BP increases. Saturation is between 88-90%. Unresponsive to stimuli. 0400- gave chlorhexidine solution bath. Changed gown and top linen. 0- gave PRN hydralazine for hypertension. 0600- throughout the night patient's BP kept increasing while being moved or touched. Had to increase sedation through the night.

## 2021-12-31 NOTE — PROGRESS NOTES
Progress Note  Date:2021       Room:[place] [id number]  Patient Marina Laughlin     YOB: 1960     Age:61 y.o.    ROS: could not be obtained bc pt is intuabted    Subjective    Subjective     Review of Systems    Objective         Vitals Last 24 Hours:  TEMPERATURE:  Temp  Av.3 °F (36.8 °C)  Min: 98 °F (36.7 °C)  Max: 98.5 °F (36.9 °C)  RESPIRATIONS RANGE: Resp  Av.3  Min: 24  Max: 38  PULSE OXIMETRY RANGE: SpO2  Av.5 %  Min: 89 %  Max: 94 %  PULSE RANGE: Pulse  Av.7  Min: 79  Max: 87  BLOOD PRESSURE RANGE: No data recorded.  ; No data recorded. I/O (24Hr): Intake/Output Summary (Last 24 hours) at 2021 1340  Last data filed at 2021 0500  Gross per 24 hour   Intake 1617 ml   Output --   Net 1617 ml     Objective:  General Appearance:  Ill-appearing. Vital signs: (most recent): Blood pressure (!) 111/50, pulse 87, temperature 98.5 °F (36.9 °C), temperature source Oral, resp. rate 29, height 5' 8\" (1.727 m), weight 268 lb 15.4 oz (122 kg), SpO2 94 %. HEENT: (+ ET tube)    Lungs: There are decreased breath sounds. Heart: S1 normal and S2 normal.    Abdomen: Abdomen is soft. Pulses: Distal pulses are intact. Skin:  Warm and dry. Labs/Imaging/Diagnostics    Labs:  CBC:  Recent Labs     21  1721 21  0003 21  0520 21  1101 21  0027 21  0508 21  0736   WBC 20.4*  --  17.9*  --   --  19.2*  --    RBC 3.76*  --  3.05*  --   --  3.03*  --    HGB 11.1*  11.4*   < > 8.6*   < > 9.4* 8.7* 8.1*   HCT 32.1*  --  26.0*  --   --  25.5*  --    MCV 85.4  --  85.3  --   --  84.2  --    RDW 15.8*  --  15.7*  --   --  15.3*  --      --  263  --   --  330  --     < > = values in this interval not displayed.      CHEMISTRIES:  Recent Labs     21  0600 21  1101 21  0027 21  0509 21  0736   *  --   --  133*  --    K 5.2*  --   --  5.1*  --    CL 89*  --   --  90*  --    CO2 15*  --   -- 17*  --    *  --   --  140*  --    CREATININE 11.03*   < > 11.0* 9.76* 11.9*   GLUCOSE 135*  --   --  119*  --     < > = values in this interval not displayed. PT/INR:  Recent Labs     12/28/21 1952   PROTIME 14.5   INR 1.1     APTT:  Recent Labs     12/28/21 1952   APTT 63.1*     LIVER PROFILE:  Recent Labs     12/30/21  0600 12/31/21  0509   AST 44* 38   ALT 49* 41   BILITOT 0.4 0.4   ALKPHOS 139* 146*       Imaging Last 24 Hours:  US DUP LOWER EXTREMITIES BILATERAL VENOUS    Result Date: 12/28/2021  US DUP LOWER EXTREMITIES BILATERAL VENOUS CLINICAL HISTORY: Ventilated patient. Covid positive. COMPARISONS: FINDINGS: Duplex color ultrasound as well as  both gray scale and spectral Doppler ultrasound of the deep venous system of both the left land right lower extremity from the inguinal ligaments to the popliteal fossa was performed. There are no findings of deep venous thrombus in the visualized vessels of the left or right  lower extremity from the common femoral vein to the popliteal. However, the findings are positive for thrombus within the right posterior tibial vein and left posterior tibial vein. POSITIVE FOR THROMBUS WITHIN THE RIGHT POSTERIOR TIBIAL VEIN AND LEFT POSTERIOR TIBIAL VEIN    Assessment//Plan           Hospital Problems           Last Modified POA    Acute respiratory failure with hypoxia ([address] 12/22/2021 Yes    Pneumonia due to COVID-19 virus 12/24/2021 Yes    Acute kidney injury ([address] 12/24/2021 Yes    Septic shock ([address] 12/28/2021 Yes    Acute deep vein thrombosis (DVT) of both lower extremities ([address] 12/28/2021 Yes    Encounter for hospice care discussion 12/29/2021 Yes    Goals of care, counseling/discussion 12/29/2021 Yes    Advanced care planning/counseling discussion 12/29/2021 Yes        Assessment & Plan    12/28: Dialysis catheter was placed, started on heparin drip for right lower leg DVT. Taper off Levophed as tolerated. Continue with Protonix.   Keep fingerstick 1 40-1 80. No active GI bleeding noted. Continue Actemra. possible needing IVC filter if pt contiues to have GI bleed. hgb is stable. 12/29: Patient was seen and evaluated. On pressors to keep map above 65. Continue with heparin drip. Continue with Reglan, HD as per nephrology. Continue tube feeds. Watch for GI bleed. 12/30: Vent support. Wean off FiO2 as tolerated. Continue with steroids. Patient did not get remdesivir due to renal failure and elevated LFTs. Follow-up hospice. Patient getting HD with fluid removal ,continue with coagulation for DVT.   12/31: c/w current management , c/w HD, increase fluid removal as tolerated, monitor hgb, prognosis is poor, strict I/O  Electronically signed by Madina Lambert MD on 12/28/21 at 3:06 PM EST

## 2021-12-31 NOTE — PROGRESS NOTES
LATE ENTRY:  Note that documentation of shift assessment from 12/28/2021 night shift is now entered in chart.

## 2021-12-31 NOTE — PROGRESS NOTES
Nephrology Progress Note    1. SUREKHA: baseline function unknown, 2/2 hemodynamic instability, baseline unknown, nml function years ago however now presented Scr in the 2s, started on HD 12/24, catheter changed (12/28)  2. Acute hypoxic/hypercarbic respiratory failure: 2/2 covid-19 PNA, intubated  3. Shock: on pressors   4. Hyperkalemia  5. Metabolic acidosis: 2/2 lactic acidosis and renal failure  6. Hypoalbuminemia  7. Transaminitis      Plan:  - continue on HD. Dialyzed short yesterday. 3 hours today. None tomorrow  -Hemodialysis catheter in right IJ  -Increase fluid removal as tolerated  -Retacrit for anemia       Patient Active Problem List:     Acute respiratory failure with hypoxia (HCC)      Subjective:  Admit Date: 12/22/2021    Interval History: pt off levo. Remains on vent. Dialyzed yesterday. Medications:  Scheduled Meds:   carboxymethylcellulose PF  2 drop Both Eyes TID    insulin lispro  0-12 Units SubCUTAneous Q6H    metoclopramide  10 mg IntraVENous Q8H    meropenem  500 mg IntraVENous Q24H    pantoprazole  40 mg IntraVENous BID    chlorhexidine  15 mL Mouth/Throat BID    sennosides-docusate sodium  2 tablet Oral BID    polyethylene glycol  17 g Oral Daily    sodium chloride flush  5-40 mL IntraVENous 2 times per day    dexamethasone  6 mg IntraVENous Q24H     Continuous Infusions:   heparin 25,000 units in 0.9% sodium chloride 250 mL infusion 12.4 Units/kg/hr (12/31/21 0705)    norepinephrine Stopped (12/29/21 2015)    dextrose      sodium chloride 100 mL/hr at 12/30/21 0800    fentaNYL (SUBLIMAZE) infusion 150 mcg/hr (12/31/21 0413)    propofol 50 mcg/kg/min (12/31/21 0450)       CBC:   Recent Labs     12/30/21  0520 12/30/21  1101 12/31/21  0508 12/31/21  0736   WBC 17.9*  --  19.2*  --    HGB 8.6*   < > 8.7* 8.1*     --  330  --     < > = values in this interval not displayed.      CMP:    Recent Labs     12/30/21  0600 12/30/21  1101 12/31/21  0027 12/31/21  0979 12/31/21  0736   *  --   --  133*  --    K 5.2*  --   --  5.1*  --    CL 89*  --   --  90*  --    CO2 15*  --   --  17*  --    *  --   --  140*  --    CREATININE 11.03*   < > 11.0* 9.76* 11.9*   GLUCOSE 135*  --   --  119*  --    CALCIUM 7.2*  --   --  7.9*  --    LABGLOM 4.8*   < > 5* 5.5* 4*    < > = values in this interval not displayed. Troponin:   No results for input(s): TROPONINI in the last 72 hours. BNP: No results for input(s): BNP in the last 72 hours. INR:   Recent Labs     12/28/21 1952   INR 1.1     Lipids: No results for input(s): CHOL, LDLDIRECT, TRIG, HDL, AMYLASE, LIPASE in the last 72 hours. Liver:   Recent Labs     12/31/21  0509   AST 38   ALT 41   ALKPHOS 146*   PROT 5.9*   LABALBU 2.8*   BILITOT 0.4     Iron:    Recent Labs     12/30/21  0600   FERRITIN 1,358*     Urinalysis: No results for input(s): UA in the last 72 hours.     Objective:  Vitals: BP (!) 111/50   Pulse 87   Temp 98.5 °F (36.9 °C) (Oral)   Resp 29   Ht 5' 8\" (1.727 m)   Wt 268 lb 15.4 oz (122 kg)   SpO2 94%   BMI 40.90 kg/m²    Wt Readings from Last 3 Encounters:   12/30/21 268 lb 15.4 oz (122 kg)   08/21/18 254 lb (115.2 kg)      24HR INTAKE/OUTPUT:      Intake/Output Summary (Last 24 hours) at 12/31/2021 0941  Last data filed at 12/31/2021 0500  Gross per 24 hour   Intake 2017 ml   Output 2900 ml   Net -883 ml        General: intubated, sedated  HEENT: normocephalic, atraumatic, ETT in place  Lungs: intubated, on vent, coarse breath sounds  Heart: regular rate and rhythm, no murmurs or rubs  Abdomen: soft, non-tender, non-distended  Ext: no cyanosis, ++ peripheral edema  Neuro: sedated        Electronically signed by Aretha Soriano MD, MD

## 2021-12-31 NOTE — CARE COORDINATION
PER [facility] RN CONSENTS ARE SIGNED AND THEY ARE TO BE CALLED ONCE FAMILY IS READY. SPOKE WITH RN AND SHE STATES THEY ARE WAITING TIL AFTER THE NEW YEAR SO LIKELY 1/2/22.

## 2022-01-01 VITALS
DIASTOLIC BLOOD PRESSURE: 60 MMHG | BODY MASS INDEX: 40.53 KG/M2 | TEMPERATURE: 97.7 F | HEIGHT: 68 IN | SYSTOLIC BLOOD PRESSURE: 166 MMHG | WEIGHT: 267.42 LBS | OXYGEN SATURATION: 47 %

## 2022-01-01 LAB
ALBUMIN SERPL-MCNC: 2.8 G/DL (ref 3.5–4.6)
ALBUMIN SERPL-MCNC: 2.8 G/DL (ref 3.5–4.6)
ALP BLD-CCNC: 136 U/L (ref 35–104)
ALP BLD-CCNC: 150 U/L (ref 35–104)
ALT SERPL-CCNC: 32 U/L (ref 0–41)
ALT SERPL-CCNC: 33 U/L (ref 0–41)
ANION GAP SERPL CALCULATED.3IONS-SCNC: 25 MEQ/L (ref 9–15)
ANION GAP SERPL CALCULATED.3IONS-SCNC: 29 MEQ/L (ref 9–15)
ANISOCYTOSIS: ABNORMAL
ANTI-XA UNFRAC HEPARIN: 0.35 IU/ML
ANTI-XA UNFRAC HEPARIN: 0.41 IU/ML
ANTI-XA UNFRAC HEPARIN: 0.51 IU/ML
AST SERPL-CCNC: 34 U/L (ref 0–40)
AST SERPL-CCNC: 39 U/L (ref 0–40)
BANDED NEUTROPHILS RELATIVE PERCENT: 3 %
BASE EXCESS ARTERIAL: -4 (ref -3–3)
BASE EXCESS ARTERIAL: -7 (ref -3–3)
BASE EXCESS ARTERIAL: -7 (ref -3–3)
BASE EXCESS ARTERIAL: -9 (ref -3–3)
BASE EXCESS VENOUS: -3 (ref -3–3)
BASOPHILS ABSOLUTE: 0 K/UL (ref 0–0.2)
BASOPHILS ABSOLUTE: 0 K/UL (ref 0–0.2)
BASOPHILS RELATIVE PERCENT: 0.1 %
BASOPHILS RELATIVE PERCENT: 0.3 %
BILIRUB SERPL-MCNC: 0.4 MG/DL (ref 0.2–0.7)
BILIRUB SERPL-MCNC: 0.4 MG/DL (ref 0.2–0.7)
BUN BLDV-MCNC: 122 MG/DL (ref 8–23)
BUN BLDV-MCNC: 156 MG/DL (ref 8–23)
CALCIUM IONIZED: 0.94 MMOL/L (ref 1.12–1.32)
CALCIUM IONIZED: 1 MMOL/L (ref 1.12–1.32)
CALCIUM IONIZED: 1.01 MMOL/L (ref 1.12–1.32)
CALCIUM IONIZED: 1.03 MMOL/L (ref 1.12–1.32)
CALCIUM IONIZED: 1.03 MMOL/L (ref 1.12–1.32)
CALCIUM SERPL-MCNC: 8 MG/DL (ref 8.5–9.9)
CALCIUM SERPL-MCNC: 8.2 MG/DL (ref 8.5–9.9)
CHLORIDE BLD-SCNC: 87 MEQ/L (ref 95–107)
CHLORIDE BLD-SCNC: 90 MEQ/L (ref 95–107)
CO2: 15 MEQ/L (ref 20–31)
CO2: 18 MEQ/L (ref 20–31)
CREAT SERPL-MCNC: 8.18 MG/DL (ref 0.7–1.2)
CREAT SERPL-MCNC: 9.76 MG/DL (ref 0.7–1.2)
D DIMER: 11.21 MG/L FEU (ref 0–0.5)
D DIMER: 9 MG/L FEU (ref 0–0.5)
EOSINOPHILS ABSOLUTE: 0 K/UL (ref 0–0.7)
EOSINOPHILS ABSOLUTE: 0.1 K/UL (ref 0–0.7)
EOSINOPHILS RELATIVE PERCENT: 0.3 %
EOSINOPHILS RELATIVE PERCENT: 0.5 %
FERRITIN: 1215 UG/L (ref 30–400)
GFR AFRICAN AMERICAN: 6
GFR AFRICAN AMERICAN: 6.6
GFR AFRICAN AMERICAN: 7
GFR AFRICAN AMERICAN: 7
GFR AFRICAN AMERICAN: 8.1
GFR NON-AFRICAN AMERICAN: 5
GFR NON-AFRICAN AMERICAN: 5.5
GFR NON-AFRICAN AMERICAN: 6
GFR NON-AFRICAN AMERICAN: 6
GFR NON-AFRICAN AMERICAN: 6.7
GLOBULIN: 3 G/DL (ref 2.3–3.5)
GLOBULIN: 3 G/DL (ref 2.3–3.5)
GLUCOSE BLD-MCNC: 100 MG/DL (ref 60–115)
GLUCOSE BLD-MCNC: 110 MG/DL (ref 60–115)
GLUCOSE BLD-MCNC: 118 MG/DL (ref 60–115)
GLUCOSE BLD-MCNC: 77 MG/DL (ref 60–115)
GLUCOSE BLD-MCNC: 86 MG/DL (ref 60–115)
GLUCOSE BLD-MCNC: 87 MG/DL (ref 70–99)
GLUCOSE BLD-MCNC: 87 MG/DL (ref 70–99)
GLUCOSE BLD-MCNC: 89 MG/DL (ref 60–115)
GLUCOSE BLD-MCNC: 89 MG/DL (ref 60–115)
GLUCOSE BLD-MCNC: 98 MG/DL (ref 60–115)
GLUCOSE BLD-MCNC: 98 MG/DL (ref 60–115)
GLUCOSE BLD-MCNC: 99 MG/DL (ref 60–115)
HCO3 ARTERIAL: 18.3 MMOL/L (ref 21–29)
HCO3 ARTERIAL: 19.8 MMOL/L (ref 21–29)
HCO3 ARTERIAL: 20.4 MMOL/L (ref 21–29)
HCO3 ARTERIAL: 22.9 MMOL/L (ref 21–29)
HCO3 VENOUS: 22.8 MMOL/L (ref 23–29)
HCT VFR BLD CALC: 25.1 % (ref 42–52)
HCT VFR BLD CALC: 25.8 % (ref 42–52)
HEMOGLOBIN: 10.1 GM/DL (ref 13.5–17.5)
HEMOGLOBIN: 7.8 GM/DL (ref 13.5–17.5)
HEMOGLOBIN: 7.8 GM/DL (ref 13.5–17.5)
HEMOGLOBIN: 7.9 GM/DL (ref 13.5–17.5)
HEMOGLOBIN: 8.1 G/DL (ref 14–18)
HEMOGLOBIN: 8.4 GM/DL (ref 13.5–17.5)
HEMOGLOBIN: 8.6 G/DL (ref 14–18)
LACTATE: 0.75 MMOL/L (ref 0.4–2)
LACTATE: 0.77 MMOL/L (ref 0.4–2)
LACTATE: 0.8 MMOL/L (ref 0.4–2)
LACTATE: 0.83 MMOL/L (ref 0.4–2)
LACTATE: 0.84 MMOL/L (ref 0.4–2)
LYMPHOCYTES ABSOLUTE: 0.5 K/UL (ref 1–4.8)
LYMPHOCYTES ABSOLUTE: 0.6 K/UL (ref 1–4.8)
LYMPHOCYTES RELATIVE PERCENT: 3 %
LYMPHOCYTES RELATIVE PERCENT: 4 %
MCH RBC QN AUTO: 27.4 PG (ref 27–31.3)
MCH RBC QN AUTO: 28.4 PG (ref 27–31.3)
MCHC RBC AUTO-ENTMCNC: 32.4 % (ref 33–37)
MCHC RBC AUTO-ENTMCNC: 33.4 % (ref 33–37)
MCV RBC AUTO: 84.6 FL (ref 80–100)
MCV RBC AUTO: 85.1 FL (ref 80–100)
METAMYELOCYTES RELATIVE PERCENT: 2 %
MONOCYTES ABSOLUTE: 0.3 K/UL (ref 0.2–0.8)
MONOCYTES ABSOLUTE: 0.9 K/UL (ref 0.2–0.8)
MONOCYTES RELATIVE PERCENT: 1.8 %
MONOCYTES RELATIVE PERCENT: 6.1 %
MYELOCYTE PERCENT: 1 %
NEUTROPHILS ABSOLUTE: 14 K/UL (ref 1.4–6.5)
NEUTROPHILS ABSOLUTE: 14.3 K/UL (ref 1.4–6.5)
NEUTROPHILS RELATIVE PERCENT: 89 %
NEUTROPHILS RELATIVE PERCENT: 90.3 %
NUCLEATED RED BLOOD CELLS: 1 /100 WBC
O2 SAT, ARTERIAL: 92 % (ref 93–100)
O2 SAT, ARTERIAL: 93 % (ref 93–100)
O2 SAT, ARTERIAL: 93 % (ref 93–100)
O2 SAT, ARTERIAL: 94 % (ref 93–100)
O2 SAT, VEN: 95 %
PCO2 ARTERIAL: 41 MM HG (ref 35–45)
PCO2 ARTERIAL: 42 MM HG (ref 35–45)
PCO2 ARTERIAL: 47 MM HG (ref 35–45)
PCO2 ARTERIAL: 48 MM HG (ref 35–45)
PCO2, VEN: 44.1 MM HG (ref 40–50)
PDW BLD-RTO: 15.4 % (ref 11.5–14.5)
PDW BLD-RTO: 15.7 % (ref 11.5–14.5)
PERFORMED ON: ABNORMAL
PERFORMED ON: NORMAL
PH ARTERIAL: 7.24 (ref 7.35–7.45)
PH ARTERIAL: 7.25 (ref 7.35–7.45)
PH ARTERIAL: 7.29 (ref 7.35–7.45)
PH ARTERIAL: 7.29 (ref 7.35–7.45)
PH VENOUS: 7.32 (ref 7.35–7.45)
PLATELET # BLD: 280 K/UL (ref 130–400)
PLATELET # BLD: 294 K/UL (ref 130–400)
PLATELET SLIDE REVIEW: NORMAL
PO2 ARTERIAL: 75 MM HG (ref 75–108)
PO2 ARTERIAL: 75 MM HG (ref 75–108)
PO2 ARTERIAL: 76 MM HG (ref 75–108)
PO2 ARTERIAL: 85 MM HG (ref 75–108)
PO2, VEN: 79 MM HG
POC CHLORIDE: 100 MEQ/L (ref 99–110)
POC CHLORIDE: 101 MEQ/L (ref 99–110)
POC CHLORIDE: 101 MEQ/L (ref 99–110)
POC CREATININE: 10.5 MG/DL (ref 0.8–1.3)
POC CREATININE: 11.2 MG/DL (ref 0.8–1.3)
POC CREATININE: 11.3 MG/DL (ref 0.8–1.3)
POC CREATININE: 8.9 MG/DL (ref 0.8–1.3)
POC CREATININE: 9.7 MG/DL (ref 0.8–1.3)
POC FIO2: 70
POC HEMATOCRIT: 23 % (ref 41–53)
POC HEMATOCRIT: 25 % (ref 41–53)
POC HEMATOCRIT: 30 % (ref 41–53)
POC POTASSIUM: 4.2 MEQ/L (ref 3.5–5.1)
POC POTASSIUM: 4.4 MEQ/L (ref 3.5–5.1)
POC POTASSIUM: 4.5 MEQ/L (ref 3.5–5.1)
POC POTASSIUM: 4.7 MEQ/L (ref 3.5–5.1)
POC POTASSIUM: 4.9 MEQ/L (ref 3.5–5.1)
POC SAMPLE TYPE: ABNORMAL
POC SODIUM: 127 MEQ/L (ref 136–145)
POC SODIUM: 129 MEQ/L (ref 136–145)
POC SODIUM: 130 MEQ/L (ref 136–145)
POTASSIUM SERPL-SCNC: 4.7 MEQ/L (ref 3.4–4.9)
POTASSIUM SERPL-SCNC: 5 MEQ/L (ref 3.4–4.9)
RBC # BLD: 2.96 M/UL (ref 4.7–6.1)
RBC # BLD: 3.03 M/UL (ref 4.7–6.1)
SODIUM BLD-SCNC: 131 MEQ/L (ref 135–144)
SODIUM BLD-SCNC: 133 MEQ/L (ref 135–144)
TCO2 ARTERIAL: 20 (ref 22–29)
TCO2 ARTERIAL: 21 (ref 22–29)
TCO2 ARTERIAL: 22 (ref 22–29)
TCO2 ARTERIAL: 24 (ref 22–29)
TCO2 CALC VENOUS: 24 MMOL/L
TOTAL PROTEIN: 5.8 G/DL (ref 6.3–8)
TOTAL PROTEIN: 5.8 G/DL (ref 6.3–8)
WBC # BLD: 15 K/UL (ref 4.8–10.8)
WBC # BLD: 15.6 K/UL (ref 4.8–10.8)

## 2022-01-01 PROCEDURE — 85520 HEPARIN ASSAY: CPT

## 2022-01-01 PROCEDURE — 84295 ASSAY OF SERUM SODIUM: CPT

## 2022-01-01 PROCEDURE — 2580000003 HC RX 258: Performed by: INTERNAL MEDICINE

## 2022-01-01 PROCEDURE — 82330 ASSAY OF CALCIUM: CPT

## 2022-01-01 PROCEDURE — 85025 COMPLETE CBC W/AUTO DIFF WBC: CPT

## 2022-01-01 PROCEDURE — 37799 UNLISTED PX VASCULAR SURGERY: CPT

## 2022-01-01 PROCEDURE — 6360000002 HC RX W HCPCS: Performed by: INTERNAL MEDICINE

## 2022-01-01 PROCEDURE — 2700000000 HC OXYGEN THERAPY PER DAY

## 2022-01-01 PROCEDURE — 6370000000 HC RX 637 (ALT 250 FOR IP): Performed by: INTERNAL MEDICINE

## 2022-01-01 PROCEDURE — 94761 N-INVAS EAR/PLS OXIMETRY MLT: CPT

## 2022-01-01 PROCEDURE — 82435 ASSAY OF BLOOD CHLORIDE: CPT

## 2022-01-01 PROCEDURE — 82728 ASSAY OF FERRITIN: CPT

## 2022-01-01 PROCEDURE — 2000000000 HC ICU R&B

## 2022-01-01 PROCEDURE — 83605 ASSAY OF LACTIC ACID: CPT

## 2022-01-01 PROCEDURE — 82803 BLOOD GASES ANY COMBINATION: CPT

## 2022-01-01 PROCEDURE — 6370000000 HC RX 637 (ALT 250 FOR IP): Performed by: NURSE PRACTITIONER

## 2022-01-01 PROCEDURE — 80053 COMPREHEN METABOLIC PANEL: CPT

## 2022-01-01 PROCEDURE — 94003 VENT MGMT INPAT SUBQ DAY: CPT

## 2022-01-01 PROCEDURE — 99291 CRITICAL CARE FIRST HOUR: CPT | Performed by: INTERNAL MEDICINE

## 2022-01-01 PROCEDURE — 6360000002 HC RX W HCPCS: Performed by: NURSE PRACTITIONER

## 2022-01-01 PROCEDURE — 85014 HEMATOCRIT: CPT

## 2022-01-01 PROCEDURE — 2580000003 HC RX 258: Performed by: NURSE PRACTITIONER

## 2022-01-01 PROCEDURE — 82565 ASSAY OF CREATININE: CPT

## 2022-01-01 PROCEDURE — 84132 ASSAY OF SERUM POTASSIUM: CPT

## 2022-01-01 PROCEDURE — 85379 FIBRIN DEGRADATION QUANT: CPT

## 2022-01-01 PROCEDURE — C9113 INJ PANTOPRAZOLE SODIUM, VIA: HCPCS | Performed by: INTERNAL MEDICINE

## 2022-01-01 RX ORDER — ATROPINE SULFATE 10 MG/ML
2 SOLUTION/ DROPS OPHTHALMIC EVERY 4 HOURS PRN
Status: DISCONTINUED | OUTPATIENT
Start: 2022-01-01 | End: 2022-01-01 | Stop reason: HOSPADM

## 2022-01-01 RX ORDER — LORAZEPAM 2 MG/ML
0.5 INJECTION INTRAMUSCULAR
Status: DISCONTINUED | OUTPATIENT
Start: 2022-01-01 | End: 2022-01-01 | Stop reason: HOSPADM

## 2022-01-01 RX ORDER — FENTANYL CITRATE 50 UG/ML
25 INJECTION, SOLUTION INTRAMUSCULAR; INTRAVENOUS EVERY 10 MIN PRN
Status: DISCONTINUED | OUTPATIENT
Start: 2022-01-01 | End: 2022-01-01 | Stop reason: HOSPADM

## 2022-01-01 RX ADMIN — FENTANYL CITRATE 200 MCG/HR: 50 INJECTION INTRAVENOUS at 11:19

## 2022-01-01 RX ADMIN — HEPARIN SODIUM 14.4 UNITS/KG/HR: 5000 INJECTION, SOLUTION INTRAVENOUS; SUBCUTANEOUS at 15:05

## 2022-01-01 RX ADMIN — METOCLOPRAMIDE HYDROCHLORIDE 10 MG: 5 INJECTION INTRAMUSCULAR; INTRAVENOUS at 00:52

## 2022-01-01 RX ADMIN — FENTANYL CITRATE 200 MCG/HR: 50 INJECTION INTRAVENOUS at 01:05

## 2022-01-01 RX ADMIN — PROPOFOL 30 MCG/KG/MIN: 10 INJECTION, EMULSION INTRAVENOUS at 19:37

## 2022-01-01 RX ADMIN — CARBOXYMETHYLCELLULOSE SODIUM 2 DROP: 0.5 SOLUTION/ DROPS OPHTHALMIC at 10:14

## 2022-01-01 RX ADMIN — POLYETHYLENE GLYCOL 3350 17 G: 17 POWDER, FOR SOLUTION ORAL at 09:00

## 2022-01-01 RX ADMIN — CHLORHEXIDINE GLUCONATE 15 ML: 1.2 RINSE ORAL at 21:51

## 2022-01-01 RX ADMIN — PROPOFOL 30 MCG/KG/MIN: 10 INJECTION, EMULSION INTRAVENOUS at 12:09

## 2022-01-01 RX ADMIN — HYDRALAZINE HYDROCHLORIDE 10 MG: 20 INJECTION INTRAMUSCULAR; INTRAVENOUS at 15:06

## 2022-01-01 RX ADMIN — METOCLOPRAMIDE HYDROCHLORIDE 10 MG: 5 INJECTION INTRAMUSCULAR; INTRAVENOUS at 16:24

## 2022-01-01 RX ADMIN — SENNOSIDES, DOCUSATE SODIUM 2 TABLET: 8.6; 5 TABLET ORAL at 08:59

## 2022-01-01 RX ADMIN — PANTOPRAZOLE SODIUM 40 MG: 40 INJECTION, POWDER, FOR SOLUTION INTRAVENOUS at 08:59

## 2022-01-01 RX ADMIN — Medication 10 ML: at 09:00

## 2022-01-01 RX ADMIN — MEROPENEM 500 MG: 500 INJECTION, POWDER, FOR SOLUTION INTRAVENOUS at 11:15

## 2022-01-01 RX ADMIN — FENTANYL CITRATE 200 MCG/HR: 50 INJECTION INTRAVENOUS at 16:22

## 2022-01-01 RX ADMIN — Medication 10 ML: at 07:57

## 2022-01-01 RX ADMIN — HEPARIN SODIUM 14.4 UNITS/KG/HR: 5000 INJECTION, SOLUTION INTRAVENOUS; SUBCUTANEOUS at 08:05

## 2022-01-01 RX ADMIN — PANTOPRAZOLE SODIUM 40 MG: 40 INJECTION, POWDER, FOR SOLUTION INTRAVENOUS at 21:51

## 2022-01-01 RX ADMIN — CHLORHEXIDINE GLUCONATE 15 ML: 1.2 RINSE ORAL at 08:59

## 2022-01-01 RX ADMIN — FENTANYL CITRATE 200 MCG/HR: 50 INJECTION INTRAVENOUS at 04:21

## 2022-01-01 RX ADMIN — MEROPENEM 500 MG: 500 INJECTION, POWDER, FOR SOLUTION INTRAVENOUS at 11:12

## 2022-01-01 RX ADMIN — METOCLOPRAMIDE HYDROCHLORIDE 10 MG: 5 INJECTION INTRAMUSCULAR; INTRAVENOUS at 07:57

## 2022-01-01 RX ADMIN — HEPARIN SODIUM 14.4 UNITS/KG/HR: 5000 INJECTION, SOLUTION INTRAVENOUS; SUBCUTANEOUS at 10:14

## 2022-01-01 RX ADMIN — PROPOFOL 31.11 MCG/KG/MIN: 10 INJECTION, EMULSION INTRAVENOUS at 06:21

## 2022-01-01 RX ADMIN — FENTANYL CITRATE 200 MCG/HR: 50 INJECTION INTRAVENOUS at 06:35

## 2022-01-01 RX ADMIN — HEPARIN SODIUM 13.93 UNITS/KG/HR: 5000 INJECTION, SOLUTION INTRAVENOUS; SUBCUTANEOUS at 01:03

## 2022-01-01 RX ADMIN — PROPOFOL 30 MCG/KG/MIN: 10 INJECTION, EMULSION INTRAVENOUS at 10:23

## 2022-01-01 RX ADMIN — PROPOFOL 30 MCG/KG/MIN: 10 INJECTION, EMULSION INTRAVENOUS at 14:58

## 2022-01-01 RX ADMIN — CHLORHEXIDINE GLUCONATE 15 ML: 1.2 RINSE ORAL at 07:57

## 2022-01-01 RX ADMIN — METOCLOPRAMIDE HYDROCHLORIDE 10 MG: 5 INJECTION INTRAMUSCULAR; INTRAVENOUS at 08:59

## 2022-01-01 RX ADMIN — PROPOFOL 31.11 MCG/KG/MIN: 10 INJECTION, EMULSION INTRAVENOUS at 01:08

## 2022-01-01 RX ADMIN — PROPOFOL 30 MCG/KG/MIN: 10 INJECTION, EMULSION INTRAVENOUS at 09:24

## 2022-01-01 RX ADMIN — FENTANYL CITRATE 200 MCG/HR: 50 INJECTION INTRAVENOUS at 22:34

## 2022-01-01 RX ADMIN — Medication 10 ML: at 21:51

## 2022-01-01 RX ADMIN — PROPOFOL 30 MCG/KG/MIN: 10 INJECTION, EMULSION INTRAVENOUS at 11:12

## 2022-01-01 RX ADMIN — PROPOFOL 30 MCG/KG/MIN: 10 INJECTION, EMULSION INTRAVENOUS at 00:20

## 2022-01-01 RX ADMIN — PANTOPRAZOLE SODIUM 40 MG: 40 INJECTION, POWDER, FOR SOLUTION INTRAVENOUS at 07:57

## 2022-01-01 RX ADMIN — CARBOXYMETHYLCELLULOSE SODIUM 2 DROP: 0.5 SOLUTION/ DROPS OPHTHALMIC at 14:59

## 2022-01-01 RX ADMIN — CARBOXYMETHYLCELLULOSE SODIUM 2 DROP: 0.5 SOLUTION/ DROPS OPHTHALMIC at 10:22

## 2022-01-01 ASSESSMENT — PULMONARY FUNCTION TESTS
PIF_VALUE: 47
PIF_VALUE: 49
PIF_VALUE: 47
PIF_VALUE: 51
PIF_VALUE: 48
PIF_VALUE: 47
PIF_VALUE: 49
PIF_VALUE: 48
PIF_VALUE: 37
PIF_VALUE: 49
PIF_VALUE: 49
PIF_VALUE: 42
PIF_VALUE: 50
PIF_VALUE: 51
PIF_VALUE: 50
PIF_VALUE: 49
PIF_VALUE: 47
PIF_VALUE: 48
PIF_VALUE: 49
PIF_VALUE: 48
PIF_VALUE: 47
PIF_VALUE: 51
PIF_VALUE: 48
PIF_VALUE: 49
PIF_VALUE: 49
PIF_VALUE: 46
PIF_VALUE: 48
PIF_VALUE: 38
PIF_VALUE: 49
PIF_VALUE: 48
PIF_VALUE: 47
PIF_VALUE: 48
PIF_VALUE: 51
PIF_VALUE: 48
PIF_VALUE: 48
PIF_VALUE: 49
PIF_VALUE: 46
PIF_VALUE: 49
PIF_VALUE: 51
PIF_VALUE: 39
PIF_VALUE: 49
PIF_VALUE: 47
PIF_VALUE: 49
PIF_VALUE: 49
PIF_VALUE: 51
PIF_VALUE: 48
PIF_VALUE: 49
PIF_VALUE: 49
PIF_VALUE: 48
PIF_VALUE: 48
PIF_VALUE: 47
PIF_VALUE: 47
PIF_VALUE: 49
PIF_VALUE: 48
PIF_VALUE: 47
PIF_VALUE: 49
PIF_VALUE: 49
PIF_VALUE: 48
PIF_VALUE: 49
PIF_VALUE: 49
PIF_VALUE: 48
PIF_VALUE: 47
PIF_VALUE: 47
PIF_VALUE: 50
PIF_VALUE: 49
PIF_VALUE: 40
PIF_VALUE: 36
PIF_VALUE: 51
PIF_VALUE: 44
PIF_VALUE: 50
PIF_VALUE: 49
PIF_VALUE: 47
PIF_VALUE: 50
PIF_VALUE: 50
PIF_VALUE: 41
PIF_VALUE: 50
PIF_VALUE: 47
PIF_VALUE: 43
PIF_VALUE: 42
PIF_VALUE: 47
PIF_VALUE: 49
PIF_VALUE: 44
PIF_VALUE: 39

## 2022-01-01 ASSESSMENT — PAIN SCALES - GENERAL
PAINLEVEL_OUTOF10: 0

## 2022-01-01 NOTE — PROGRESS NOTES
Nephrology Progress Note    1. SUREKHA: baseline function unknown, 2/2 hemodynamic instability, baseline unknown, nml function years ago however now presented Scr in the 2s, started on HD 12/24, catheter changed (12/28)  2. Acute hypoxic/hypercarbic respiratory failure: 2/2 covid-19 PNA, intubated  3. Shock: on pressors   4. Hyperkalemia  5. Metabolic acidosis: 2/2 lactic acidosis and renal failure  6. Hypoalbuminemia  7. Transaminitis      Plan:  -Patient has poor prognosis with family leaning towards terminal wean  -We will do hemodialysis tomorrow but will cancel it if they decide on terminal weaning       Patient Active Problem List:     Acute respiratory failure with hypoxia (HCC)      Subjective:  Admit Date: 12/22/2021    Interval History: Had dialysis again yesterday. Tolerated fluid removal.  Significantly azotemic. Medications:  Scheduled Meds:   carboxymethylcellulose PF  2 drop Both Eyes TID    insulin lispro  0-12 Units SubCUTAneous Q6H    metoclopramide  10 mg IntraVENous Q8H    meropenem  500 mg IntraVENous Q24H    pantoprazole  40 mg IntraVENous BID    chlorhexidine  15 mL Mouth/Throat BID    sennosides-docusate sodium  2 tablet Oral BID    polyethylene glycol  17 g Oral Daily    sodium chloride flush  5-40 mL IntraVENous 2 times per day     Continuous Infusions:   heparin 25,000 units in 0.9% sodium chloride 250 mL infusion 13.927 Units/kg/hr (01/01/22 0103)    norepinephrine Stopped (12/29/21 2015)    dextrose      sodium chloride 100 mL/hr at 12/30/21 0800    fentaNYL (SUBLIMAZE) infusion 200 mcg/hr (01/01/22 0635)    propofol 30 mcg/kg/min (01/01/22 1023)       CBC:   Recent Labs     12/31/21  0508 12/31/21  0736 01/01/22  0600 01/01/22  0805   WBC 19.2*  --  15.6*  --    HGB 8.7*   < > 8.1* 7.9*     --  294  --     < > = values in this interval not displayed.      CMP:    Recent Labs     12/30/21  0600 12/30/21  1101 12/31/21  0509 12/31/21  0736 12/31/21  5427 01/01/22  0600 01/01/22  0805   *  --  133*  --   --  133*  --    K 5.2*  --  5.1*  --   --  4.7  --    CL 89*  --  90*  --   --  90*  --    CO2 15*  --  17*  --   --  18*  --    *  --  140*  --   --  122*  --    CREATININE 11.03*   < > 9.76*   < > 8.9* 8.18* 9.7*   GLUCOSE 135*  --  119*  --   --  87  --    CALCIUM 7.2*  --  7.9*  --   --  8.0*  --    LABGLOM 4.8*   < > 5.5*   < > 6* 6.7* 6*    < > = values in this interval not displayed. Troponin:   No results for input(s): TROPONINI in the last 72 hours. BNP: No results for input(s): BNP in the last 72 hours. INR:   No results for input(s): INR in the last 72 hours. Lipids: No results for input(s): CHOL, LDLDIRECT, TRIG, HDL, AMYLASE, LIPASE in the last 72 hours. Liver:   Recent Labs     01/01/22  0600   AST 34   ALT 33   ALKPHOS 136*   PROT 5.8*   LABALBU 2.8*   BILITOT 0.4     Iron:    Recent Labs     12/31/21  0504   FERRITIN 1,215*     Urinalysis: No results for input(s): UA in the last 72 hours.     Objective:  Vitals: BP (!) 166/60   Pulse 80   Temp 98.2 °F (36.8 °C) (Oral)   Resp 28   Ht 5' 8\" (1.727 m)   Wt 267 lb 6.7 oz (121.3 kg)   SpO2 93%   BMI 40.66 kg/m²    Wt Readings from Last 3 Encounters:   12/31/21 267 lb 6.7 oz (121.3 kg)   08/21/18 254 lb (115.2 kg)      24HR INTAKE/OUTPUT:      Intake/Output Summary (Last 24 hours) at 1/1/2022 1101  Last data filed at 1/1/2022 0800  Gross per 24 hour   Intake 2632 ml   Output 3300 ml   Net -668 ml        General: intubated, sedated  HEENT: normocephalic, atraumatic, ETT in place  Lungs: intubated, on vent, coarse breath sounds  Heart: regular rate and rhythm, no murmurs or rubs  Abdomen: soft, non-tender, non-distended  Ext: no cyanosis, ++ peripheral edema  Neuro: sedated        Electronically signed by Dilip Chamorro MD, MD

## 2022-01-01 NOTE — PROGRESS NOTES
133*  --    K 5.2*  --  5.1*  --   --  4.7  --    CL 89*  --  90*  --   --  90*  --    CO2 15*  --  17*  --   --  18*  --    *  --  140*  --   --  122*  --    CREATININE 11.03*   < > 9.76*   < > 8.9* 8.18* 9.7*   GLUCOSE 135*  --  119*  --   --  87  --     < > = values in this interval not displayed. PT/INR:  No results for input(s): PROTIME, INR in the last 72 hours. APTT:  No results for input(s): APTT in the last 72 hours. LIVER PROFILE:  Recent Labs     12/30/21  0600 12/31/21  0509 01/01/22  0600   AST 44* 38 34   ALT 49* 41 33   BILITOT 0.4 0.4 0.4   ALKPHOS 139* 146* 136*       Imaging Last 24 Hours:  US DUP LOWER EXTREMITIES BILATERAL VENOUS    Result Date: 12/28/2021  US DUP LOWER EXTREMITIES BILATERAL VENOUS CLINICAL HISTORY: Ventilated patient. Covid positive. COMPARISONS: FINDINGS: Duplex color ultrasound as well as  both gray scale and spectral Doppler ultrasound of the deep venous system of both the left land right lower extremity from the inguinal ligaments to the popliteal fossa was performed. There are no findings of deep venous thrombus in the visualized vessels of the left or right  lower extremity from the common femoral vein to the popliteal. However, the findings are positive for thrombus within the right posterior tibial vein and left posterior tibial vein.      POSITIVE FOR THROMBUS WITHIN THE RIGHT POSTERIOR TIBIAL VEIN AND LEFT POSTERIOR TIBIAL VEIN    Assessment//Plan           Hospital Problems           Last Modified POA    Acute respiratory failure with hypoxia ([address] 12/22/2021 Yes    Pneumonia due to COVID-19 virus 12/24/2021 Yes    Acute kidney injury ([address] 12/24/2021 Yes    Septic shock ([address] 12/28/2021 Yes    Acute deep vein thrombosis (DVT) of both lower extremities ([address] 12/28/2021 Yes    Encounter for hospice care discussion 12/29/2021 Yes    Goals of care, counseling/discussion 12/29/2021 Yes    Advanced care planning/counseling discussion 12/29/2021 Yes Assessment & Plan    12/28: Dialysis catheter was placed, started on heparin drip for right lower leg DVT. Taper off Levophed as tolerated. Continue with Protonix. Keep fingerstick 1 40-1 80. No active GI bleeding noted. Continue Actemra. possible needing IVC filter if pt contiues to have GI bleed. hgb is stable. 12/29: Patient was seen and evaluated. On pressors to keep map above 65. Continue with heparin drip. Continue with Reglan, HD as per nephrology. Continue tube feeds. Watch for GI bleed. 12/30: Vent support. Wean off FiO2 as tolerated. Continue with steroids. Patient did not get remdesivir due to renal failure and elevated LFTs. Follow-up hospice. Patient getting HD with fluid removal ,continue with coagulation for DVT. 12/31: c/w current management , c/w HD, increase fluid removal as tolerated, monitor hgb, prognosis is poor, strict I/O  1/1: Compassionate wean  possible today versus tomorrow. Continue current management. Prognosis guarded. Spoke with nursing about the plan.   Electronically signed by Mouna Snyder MD on 12/28/21 at 3:06 PM EST

## 2022-01-01 NOTE — PROGRESS NOTES
Patient ID:  Ray Chinchilla  [id number]  64 y.o.  1960    23:15 Assumed Care of Patient. Disaster Documentation initiated as per policy / SOP. Report Received from RN. Assessment Complete, please see flow sheets. Labs, orders, plan of care and meds reviewed. 0030 Patient is unresponsive to stimuli, Remains on ventilator, Heparin, Fentanyl and Propofol drips titrated per ordered protocol. Turned for Martha care, incontinent of small amount of liquid stool, martha care given, remains anuric. Did not tolerate turning well. BP increasing with any type of stimuli. Heparin Level/ Anti-XA Level drawn and sent. Hep XA Therapeutic, next draw 0600.    0600 AM Labs drawn and sent. 0700 Report to Yehuda Morillo RN      Intake/Output Summary (Last 24 hours) at 1/1/2022 0711  Last data filed at 1/1/2022 0616  Gross per 24 hour   Intake 2582 ml   Output 3300 ml   Net -718 ml           Labs:   Recent Labs     12/30/21  0520 12/30/21  1101 12/31/21  0508 12/31/21  0736 12/31/21  1611 12/31/21  2356 01/01/22  0600   WBC 17.9*  --  19.2*  --   --   --  15.6*   HGB 8.6*   < > 8.7*   < > 9.8* 7.8* 8.1*   HCT 26.0*  --  25.5*  --   --   --  25.1*     --  330  --   --   --  294    < > = values in this interval not displayed. Recent Labs     12/30/21  0600 12/30/21  1101 12/31/21  0509 12/31/21  0736 12/31/21  1611   *  --  133*  --   --    K 5.2*  --  5.1*  --   --    CL 89*  --  90*  --   --    CO2 15*  --  17*  --   --    *  --  140*  --   --    CREATININE 11.03*   < > 9.76* 11.9* 7.2*   CALCIUM 7.2*  --  7.9*  --   --     < > = values in this interval not displayed.      Recent Labs     12/30/21  0600 12/31/21  0509   AST 44* 38   ALT 49* 41   BILITOT 0.4 0.4   ALKPHOS 139* 146*               Electronically signed by Brian Ibrahim RN

## 2022-01-01 NOTE — PROGRESS NOTES
Critical Care Progress Note    2022 9:06 AM    Subjective:   Admit Date: 2021  PCP: Janes Napoles MD    Chief Complaint   Patient presents with    Shortness of Breath     Interval History:  Has been intubated and sedated. PEEP is 10, FiO2 is 70%. Has been off Levophed. UO is 2900.         21: Continues to be about the same. FiO2 is 70%, PEEP is 10. Has been off Levophed as well. Currently sedated with fentanyl, propofol. Continues to be on IV heparin. Urine output is 2900 cc. 22: Continues to be on ventilator support. FiO2 70% and PEEP is 12. Off Levophed. Sedated with fentanyl propofol. Continues to be on IV heparin. No major changes overnight.     Medications:   Scheduled Meds:   carboxymethylcellulose PF  2 drop Both Eyes TID    insulin lispro  0-12 Units SubCUTAneous Q6H    metoclopramide  10 mg IntraVENous Q8H    meropenem  500 mg IntraVENous Q24H    pantoprazole  40 mg IntraVENous BID    chlorhexidine  15 mL Mouth/Throat BID    sennosides-docusate sodium  2 tablet Oral BID    polyethylene glycol  17 g Oral Daily    sodium chloride flush  5-40 mL IntraVENous 2 times per day     Continuous Infusions:   heparin 25,000 units in 0.9% sodium chloride 250 mL infusion 13.927 Units/kg/hr (22 0103)    norepinephrine Stopped (21)    dextrose      sodium chloride 100 mL/hr at 21 0800    fentaNYL (SUBLIMAZE) infusion 200 mcg/hr (22 3242)    propofol 31.105 mcg/kg/min (22 3406)         Objective:   Vitals:   Temp (24hrs), Av.4 °F (36.9 °C), Min:98.2 °F (36.8 °C), Max:98.8 °F (37.1 °C)    BP (!) 166/60   Pulse 79   Temp 98.2 °F (36.8 °C) (Oral)   Resp 29   Ht 5' 8\" (1.727 m)   Wt 267 lb 6.7 oz (121.3 kg)   SpO2 94%   BMI 40.66 kg/m²   I/O:24HR INTAKE/OUTPUT:      Intake/Output Summary (Last 24 hours) at 2022 0906  Last data filed at 2022 0616  Gross per 24 hour   Intake 2582 ml   Output 3300 ml   Net -718 ml      0701 - 01/01 0700  In: 2582 [I.V.:1411]  Out: 3300   CVP:         Ventilator Settings:  Vent Mode: AC/VC  Rate Set: 32 bmp   Vt Ordered: 500 mL       PEEP/CPAP: 12   FiO2 : 70 %     Physical Exam:  General appearance - ill appearing   Mental status - intubated and sedated. Eyes - pupils equal and reactive, extraocular eye movements intact  Nose - normal and patent  Mouth: ETT  Neck - supple, no significant adenopathy  Chest - diminished to auscultation B/L, no wheezes, rales or rhonchi, symmetric air entry  Heart - Normal rate, regular rhythm, normal S1, S2, no murmurs, rubs, clicks or gallops  Abdomen - soft, nontender, nondistended, no masses or organomegaly  Rectal - deferred, not clinically indicated  Neurological - sedated, no focal findings   Musculoskeletal - no joint tenderness, deformity or swelling  Extremities - peripheral pulses normal, no pedal edema, no clubbing or cyanosis  Skin - normal coloration and turgor, no rashes               BMP:    Recent Labs     12/30/21  0600 12/30/21  1101 12/31/21  0509 12/31/21  0736 01/01/22  0600 01/01/22  0805   *  --  133*  --  133*  --    K 5.2*  --  5.1*  --  4.7  --    CL 89*  --  90*  --  90*  --    CO2 15*  --  17*  --  18*  --    *  --  140*  --  122*  --    CREATININE 11.03*   < > 9.76*   < > 8.18* 9.7*   GLUCOSE 135*  --  119*  --  87  --     < > = values in this interval not displayed. .  MG:3,PHOS:3)@  Ionized Calcium: No results found for: IONCA  CBC:   Recent Labs     12/31/21  0508 12/31/21  0736 01/01/22  0600 01/01/22  0805   WBC 19.2*  --  15.6*  --    HGB 8.7*   < > 8.1* 7.9*     --  294  --     < > = values in this interval not displayed. ABG: No results for input(s): PH, PCO2, PO2 in the last 72 hours. Assessment and Plan:       Impression:    - Acute hypoxic and hypercapnic respiratory failure. Continues to be on mechanical ventilation with high ventilator settings.   - Severe COVID-19 pneumonia  - Septic shock, has been off Levophed  - Combined metabolic and respiratory acidosis  - Acute kidney injury, currently on RRT  - Abnormal LFT  - Hyperkalemia, this has been stable. - Acute DVT, likely  PE. Recommendations:    - Continue close monitoring of hemodynamic and respiratory status in droplet plus isolation   - C/w vent bundle, wean off FiO2. Continue sedation. Serial ABGs. - Continue Steroids for 10 days. - did not receive Remdesivir due to SUREKHA and elevated LFTs  - Glycemic control, maintain FSG between 140-180 mg/dL   - continue anticoagulation.   - Trend inflammatory markers  - attempt to keep on dry side.   - RRT per nephrology   - strict I/O        Prophylaxis:  Stress ulcer: [] PPI Agent [] H2RA [] Sucralfate [] Other:   VTE: [] Enoxaparin  [] SC Heparin  [] SCD      DNR-CCA      Excluding procedures, the total critical care time caring for this patient with life threatening, unstable organ failure, including direct patient contact, review of medical record, management of life support systems, review of data including imaging and labs, discussions with other team members, patient's family and physicians at least 31 minutes so far today.         Electronically signed by Radha Daniel MD on 1/1/2022 at 9:06 AM

## 2022-01-01 NOTE — PLAN OF CARE
Problem: Airway Clearance - Ineffective  Goal: Achieve or maintain patent airway  Outcome: Ongoing     Problem: Gas Exchange - Impaired  Goal: Absence of hypoxia  Outcome: Ongoing  Goal: Promote optimal lung function  Outcome: Ongoing     Problem: Breathing Pattern - Ineffective  Goal: Ability to achieve and maintain a regular respiratory rate  Outcome: Ongoing     Problem:  Body Temperature -  Risk of, Imbalanced  Goal: Ability to maintain a body temperature within defined limits  Outcome: Ongoing  Goal: Will regain or maintain usual level of consciousness  Outcome: Ongoing  Goal: Complications related to the disease process, condition or treatment will be avoided or minimized  Outcome: Ongoing     Problem: Isolation Precautions - Risk of Spread of Infection  Goal: Prevent transmission of infection  Outcome: Ongoing     Problem: Nutrition Deficits  Goal: Optimize nutritional status  Outcome: Ongoing     Problem: Risk for Fluid Volume Deficit  Goal: Maintain normal heart rhythm  Outcome: Ongoing  Goal: Maintain absence of muscle cramping  Outcome: Ongoing  Goal: Maintain normal serum potassium, sodium, calcium, phosphorus, and pH  Outcome: Ongoing     Problem: Loneliness or Risk for Loneliness  Goal: Demonstrate positive use of time alone when socialization is not possible  Outcome: Ongoing     Problem: Fatigue  Goal: Verbalize increase energy and improved vitality  Outcome: Ongoing     Problem: Patient Education: Go to Patient Education Activity  Goal: Patient/Family Education  Outcome: Ongoing     Problem: Non-Violent Restraints  Goal: Removal from restraints as soon as assessed to be safe  Outcome: Ongoing  Goal: No harm/injury to patient while restraints in use  Outcome: Ongoing  Goal: Patient's dignity will be maintained  Outcome: Ongoing     Problem: Skin Integrity:  Goal: Will show no infection signs and symptoms  Description: Will show no infection signs and symptoms  Outcome: Ongoing  Goal: Absence of new skin breakdown  Description: Absence of new skin breakdown  Outcome: Ongoing     Problem: Nutrition  Goal: Optimal nutrition therapy  Outcome: Ongoing

## 2022-01-01 NOTE — PROGRESS NOTES
0800  Assessment complete. Patient remains on ventilator with propofol and fentanyl for sedation. Heparin drip infusing per MAR. Anti-xA 0.41 (2nd therapeutic) - no change to rate and next anti-xA tomorrow morning. Code status is currently DNRCC-A. Anticipating eventual compassionate weaning of ventilator when family ready. Patient unresponsive. NSR on telemetry. 1200 Patient with large amount thick creamy secretions orally and via ET tube. Pulsox briefly dropped until secretions suctioned. Assessment otherwise unchanged. 1505  Medicated with PRN hydralazine for SBP > 160. Per hospice, family meeting planned for tomorrow at noon. 1600  No changes to assessment. Large amount oral and endotracheal secretions noted when patient laid flat for repositioning. 1700  Rectal tube placed for incontinence of large amount of liquid brown stool. 2000  Assessment unchanged. Rectal tube intact and containing liquid stool.

## 2022-01-02 NOTE — PROGRESS NOTES
Nephrology Progress Note    1. SUREKHA: baseline function unknown, 2/2 hemodynamic instability, baseline unknown, nml function years ago however now presented Scr in the 2s, started on HD 12/24, catheter changed (12/28)  2. Acute hypoxic/hypercarbic respiratory failure: 2/2 covid-19 PNA, intubated  3. Shock: on pressors   4. Hyperkalemia  5. Metabolic acidosis: 2/2 lactic acidosis and renal failure  6. Hypoalbuminemia  7. Transaminitis      Plan:  -Patient has poor prognosis with family leaning towards terminal wean  -We will do hemodialysis today due to how azotemic he is  - f/u on results of hospice meeting       Patient Active Problem List:     Acute respiratory failure with hypoxia ([facility] [address]      Subjective:  Admit Date: 12/22/2021    Interval History: for hd today. Hospice meeting at noon. No other new events    Medications:  Scheduled Meds:   carboxymethylcellulose PF  2 drop Both Eyes TID    insulin lispro  0-12 Units SubCUTAneous Q6H    metoclopramide  10 mg IntraVENous Q8H    meropenem  500 mg IntraVENous Q24H    pantoprazole  40 mg IntraVENous BID    chlorhexidine  15 mL Mouth/Throat BID    sennosides-docusate sodium  2 tablet Oral BID    polyethylene glycol  17 g Oral Daily    sodium chloride flush  5-40 mL IntraVENous 2 times per day     Continuous Infusions:   heparin 25,000 units in 0.9% sodium chloride 250 mL infusion 14.4 Units/kg/hr (01/02/22 0805)    norepinephrine Stopped (12/29/21 2015)    dextrose      sodium chloride 100 mL/hr at 12/30/21 0800    fentaNYL (SUBLIMAZE) infusion 200 mcg/hr (01/02/22 0421)    propofol 30 mcg/kg/min (01/02/22 0020)       CBC:   Recent Labs     01/01/22  0600 01/01/22  0805 01/02/22  0600 01/02/22  0759   WBC 15.6*  --  15.0*  --    HGB 8.1*   < > 8.6* 7.8*     --  280  --     < > = values in this interval not displayed.      CMP:    Recent Labs     12/31/21  0509 12/31/21  0736 01/01/22  0600 01/01/22  0805 01/02/22  0011 01/02/22  0600

## 2022-01-02 NOTE — PROGRESS NOTES
0800  Assessment complete. Patient remains on ventilator with propofol and fentanyl for sedation. Heparin drip continued per MAR. Anti-xA 0.35 this morning and remains therapeutic. NSR on telemetry. Pupils very sluggish. No gag reflex during oral care. No movement of limbs to stimulus. Hospice meeting set for today at noon. Hemodialysis ordered for today pending decision. 65  Family met with hospice and then wife and sister assisted in donning appropriate PPE to enter room and see patient before compassionate wean. Message to intensivist for appropriate orders. 1250  Patient extubated to nasal cannula. Drips and tube feed discontinued. Family immediately brought to bedside. 1330  Absence of heartrate and respirations.

## 2022-01-02 NOTE — PROGRESS NOTES
Progress Note  Date:2022       Room:[place] [address]  Patient Galdino Trent     YOB: 1960     Age:61 y.o.    ROS: could not be obtained bc pt is intuabted    Subjective    Subjective     Review of Systems    Objective         Vitals Last 24 Hours:  TEMPERATURE:  Temp  Av.8 °F (36.6 °C)  Min: 97.6 °F (36.4 °C)  Max: 98.1 °F (36.7 °C)  RESPIRATIONS RANGE: Resp  Av.4  Min: 19  Max: 36  PULSE OXIMETRY RANGE: SpO2  Av.8 %  Min: 88 %  Max: 98 %  PULSE RANGE: Pulse  Av.2  Min: 78  Max: 96  BLOOD PRESSURE RANGE: No data recorded.  ; No data recorded. I/O (24Hr): Intake/Output Summary (Last 24 hours) at 2022 1253  Last data filed at 2022 0800  Gross per 24 hour   Intake 1025 ml   Output 300 ml   Net 725 ml     Objective:  General Appearance:  Ill-appearing. Vital signs: (most recent): Blood pressure (!) 166/60, pulse 88, temperature 97.7 °F (36.5 °C), temperature source Oral, resp. rate 26, height 5' 8\" (1.727 m), weight 267 lb 6.7 oz (121.3 kg), SpO2 93 %. HEENT: (+ ET tube)    Lungs: There are decreased breath sounds. Heart: S1 normal and S2 normal.    Abdomen: Abdomen is soft. Pulses: Distal pulses are intact. Skin:  Warm and dry. Labs/Imaging/Diagnostics    Labs:  CBC:  Recent Labs     21  0508 21  0736 22  0600 22  0805 22  0011 22  0600 22  0759   WBC 19.2*  --  15.6*  --   --  15.0*  --    RBC 3.03*  --  2.96*  --   --  3.03*  --    HGB 8.7*   < > 8.1*   < > 10.1* 8.6* 7.8*   HCT 25.5*  --  25.1*  --   --  25.8*  --    MCV 84.2  --  84.6  --   --  85.1  --    RDW 15.3*  --  15.4*  --   --  15.7*  --      --  294  --   --  280  --     < > = values in this interval not displayed.      CHEMISTRIES:  Recent Labs     21  0509 21  0736 22  0600 22  0805 22  0011 22  0622  0759   *  --  133*  --   --  131*  --    K 5.1*  --  4.7  --   --  5.0*  --    CL 90* --  90*  --   --  87*  --    CO2 17*  --  18*  --   --  15*  --    *  --  122*  --   --  156*  --    CREATININE 9.76*   < > 8.18*   < > 11.3* 9.76* 11.2*   GLUCOSE 119*  --  87  --   --  87  --     < > = values in this interval not displayed. PT/INR:  No results for input(s): PROTIME, INR in the last 72 hours. APTT:  No results for input(s): APTT in the last 72 hours. LIVER PROFILE:  Recent Labs     12/31/21  0509 01/01/22  0600 01/02/22  0600   AST 38 34 39   ALT 41 33 32   BILITOT 0.4 0.4 0.4   ALKPHOS 146* 136* 150*       Imaging Last 24 Hours:  US DUP LOWER EXTREMITIES BILATERAL VENOUS    Result Date: 12/28/2021  US DUP LOWER EXTREMITIES BILATERAL VENOUS CLINICAL HISTORY: Ventilated patient. Covid positive. COMPARISONS: FINDINGS: Duplex color ultrasound as well as  both gray scale and spectral Doppler ultrasound of the deep venous system of both the left land right lower extremity from the inguinal ligaments to the popliteal fossa was performed. There are no findings of deep venous thrombus in the visualized vessels of the left or right  lower extremity from the common femoral vein to the popliteal. However, the findings are positive for thrombus within the right posterior tibial vein and left posterior tibial vein.      POSITIVE FOR THROMBUS WITHIN THE RIGHT POSTERIOR TIBIAL VEIN AND LEFT POSTERIOR TIBIAL VEIN    Assessment//Plan           Hospital Problems           Last Modified POA    Acute respiratory failure with hypoxia ([address] 12/22/2021 Yes    Pneumonia due to COVID-19 virus 12/24/2021 Yes    Acute kidney injury ([address] 12/24/2021 Yes    Septic shock ([address] 12/28/2021 Yes    Acute deep vein thrombosis (DVT) of both lower extremities ([address] 12/28/2021 Yes    Encounter for hospice care discussion 12/29/2021 Yes    Goals of care, counseling/discussion 12/29/2021 Yes    Advanced care planning/counseling discussion 12/29/2021 Yes        Assessment & Plan    12/28: Dialysis catheter was placed, started on heparin drip for right lower leg DVT. Taper off Levophed as tolerated. Continue with Protonix. Keep fingerstick 1 40-1 80. No active GI bleeding noted. Continue Actemra. possible needing IVC filter if pt contiues to have GI bleed. hgb is stable. 12/29: Patient was seen and evaluated. On pressors to keep map above 65. Continue with heparin drip. Continue with Reglan, HD as per nephrology. Continue tube feeds. Watch for GI bleed. 12/30: Vent support. Wean off FiO2 as tolerated. Continue with steroids. Patient did not get remdesivir due to renal failure and elevated LFTs. Follow-up hospice. Patient getting HD with fluid removal ,continue with coagulation for DVT. 12/31: c/w current management , c/w HD, increase fluid removal as tolerated, monitor hgb, prognosis is poor, strict I/O  1/1: Compassionate wean  possible today versus tomorrow. Continue current management. Prognosis guarded. Spoke with nursing about the plan. 1/2: Continue with current management possible compassionate wean today. Family meeting with palliative today at noon as per nursing.  Prognosis is poor  Electronically signed by Kristin Torrez MD on 12/28/21 at 3:06 PM EST

## 2022-01-02 NOTE — PROGRESS NOTES
Infectious Diseases Inpatient Progress Note          HISTORY OF PRESENT ILLNESS:  Follow up critical COVID-19 with septic shock, severe hypoxia requiring intubation, currently in ICU, ventilated on assist control 70%/PEEP 10, has bilateral lower extremity deep vein thrombosis. No hypotension or pressors. Tolerating tube feeding. Positive liquid stools. on IV Vanco and meropenem, well tolerated  Acute kidney injury, worsening  Negative blood and urine cultures  Highly elevated D-dimer  Persistent leukocytosis and anemia  Remains on heparin drip  Negative blood cultures  Off Levophed   Positive facial and nose ulcers  Current Medications:     carboxymethylcellulose PF  2 drop Both Eyes TID    insulin lispro  0-12 Units SubCUTAneous Q6H    metoclopramide  10 mg IntraVENous Q8H    meropenem  500 mg IntraVENous Q24H    pantoprazole  40 mg IntraVENous BID    chlorhexidine  15 mL Mouth/Throat BID    sennosides-docusate sodium  2 tablet Oral BID    polyethylene glycol  17 g Oral Daily    sodium chloride flush  5-40 mL IntraVENous 2 times per day       Allergies:  Pcn [penicillins]      Review of Systems  ROS  unable to provide ROS because of sedation      Physical Exam  Vitals:    01/02/22 1252 01/02/22 1254 01/02/22 1255 01/02/22 1331   BP:       Pulse: 88 90 90 (!) 0   Resp: 27 30 (!) 34 (!) 0   Temp:       TempSrc:       SpO2: (!) 51% (!) 42% (!) 47%    Weight:       Height:       Temp 97.7  General Appearance: sedated, non responsive to verbals  Skin: warm and dry, no rash.    Head: normocephalic and atraumatic  Eyes:  anicteric sclerae and skin  Intact central lines  Positive nodes and facial ulcers  Lungs: Diminished breath sounds bilateral lung fields no rhonchi or wheezing  Heart: nl S1/S2, no murmur  Abdomen: soft, no distention or rigidity  NEUROLOGICAL: Does not follow any commands  Bilateral legs swelling   [place] with low urine output, kristine        DATA:    Lab Results   Component Value Date    WBC 15.0 (H) 01/02/2022    HGB 7.8 (L) 01/02/2022    HCT 25.8 (L) 01/02/2022    MCV 85.1 01/02/2022     01/02/2022     Lab Results   Component Value Date    CREATININE 11.2 (HH) 01/02/2022     (HH) 01/02/2022     (L) 01/02/2022    K 5.0 (H) 01/02/2022    CL 87 (L) 01/02/2022    CO2 15 (L) 01/02/2022       Hepatic Function Panel:  Lab Results   Component Value Date    ALKPHOS 150 01/02/2022    ALT 32 01/02/2022    AST 39 01/02/2022    PROT 5.8 01/02/2022    BILITOT 0.4 01/02/2022    LABALBU 2.8 01/02/2022       Microbiology:   All cultures are negative    No results for input(s): PH, PO2, PCO2, HCO3, BE, O2SAT in the last 72 hours. XR CHEST (SINGLE VIEW FRONTAL)    Result Date: 12/24/2021  There are bilateral alveolar opacities , infiltrates worrisome for viral COVID-19 pneumonia. CT HEAD WO CONTRAST    Result Date: 12/23/2021  Impression: Pansinusitis. All CT scans at this facility use dose modulation, iterative reconstruction, and/or weight based dosing when appropriate to reduce radiation dose to as low as reasonably achievable. XR CHEST PORTABLE    Result Date: 12/25/2021  Stable exam since yesterday. XR CHEST PORTABLE    Result Date: 12/23/2021  LEFT INTERNAL JUGULAR APPROACH CENTRAL VENOUS CATHETER PLACED IN EXPECTED POSITION. OTHERWISE, STABLE CHEST FROM YESTERDAY. XR CHEST PORTABLE    Result Date: 12/22/2021  Diffuse bilateral airspace opacities may represent pulmonary edema or pneumonia. XR CHEST PORTABLE    Result Date: 12/22/2021  There is severe increased pulmonary markings throughout both lungs worrisome for fluid overload versus viral infiltrates, pneumonia. XR CHEST ABDOMEN NG PLACEMENT    Result Date: 12/23/2021  ENDOTRACHEAL AND OROGASTRIC TUBES IN EXPECTED POSITIONS. US RETROPERITONEAL LIMITED    Result Date: 12/23/2021  ESSENTIALLY NEGATIVE LIMITED RENAL ULTRASOUND.      US DUP LOWER EXTREMITIES BILATERAL VENOUS    Result Date: 12/28/2021  POSITIVE FOR THROMBUS WITHIN THE RIGHT POSTERIOR TIBIAL VEIN AND LEFT POSTERIOR TIBIAL VEIN    D-dimer=10.67 yesterday  Ferritin is trending down  Blood cultures have been negative  D-dimer=11.21    IMPRESSION:    · Septic shock   · critical COVID 19 pneumonia  · Acute respiratory failure with hypoxia  · Acute kidney injury  · Hypercoagulable state with bilateral lower extremity acute DVT  ·     Patient Active Problem List   Diagnosis    Acute respiratory failure with hypoxia (HCC)    Pneumonia due to COVID-19 virus    Acute [facility] [address]    Septic shock ([facility] [address]    Acute deep vein thrombosis (DVT) of both lower extremities ([facility] [address]    Encounter for hospice care discussion    Goals of care, counseling/discussion    Advanced care planning/counseling discussion       PLAN:  · IV meropenem for few more days, start date 1225  · Vent support and weaning as tolerated  · Vasopressor support and weaning as tolerated  · Follow-up CBC BMP   · Follow-up cultures  · Status post Decadron   · Anticoagulation as ordered  Patient has a guarded prognosis  35 minutes were spent reviewing chart and implementing care and examining patient  Discussed with otherRN    Saray Dumont MD

## 2022-01-02 NOTE — DISCHARGE SUMMARY
Discharge Summary    Date: 1/2/2022  Patient Name: Xiomara Short YOB: 1960 Age: 64 y.o. Admit Date: 12/22/2021  Discharge Date: 1/2/2022  Discharge Condition:    Admission Diagnosis  Acute respiratory failure with hypoxia (HCC) (J96.01); Acute hypoxemic respiratory failure due to COVID-19 (HCC) (U07.1, J96.01)     Discharge Diagnosis  Active Problems: Acute respiratory failure with hypoxia (HCC) Pneumonia due to COVID-19 virus Acute kidney injury ([facility] [address] Septic shock (HCC) Acute deep vein thrombosis (DVT) of both lower extremities ([facility] [address] Encounter for hospice care discussion Goals of care, counseling/discussion Advanced care planning/counseling discussionResolved Problems: * No resolved hospital problems. [facility] Stay  Narrative of Hospital Course:  Patient was admitted for severe Covid pna Family decided to compassionately wean the patient. Patient was started on dialysis and was on pressors for shock. Pt pronouced dead 1    Consultants:  IP CONSULT TO CRITICAL CAREIP CONSULT TO PHARMACYIP CONSULT TO [address] CONSULT TO NEPHROLOGYIP CONSULT TO INFECTIOUS DISEASESIP CONSULT TO PHARMACYIP CONSULT TO PHARMACYIP CONSULT TO PALLIATIVE CAREIP CONSULT TO PALLIATIVE CAREIP CONSULT TO HOSPICEIP CONSULT TO HOSPICE    Surgeries/procedures Performed:       Treatments:            Discharge Plan/Disposition:  Home    Hospital/Incidental Findings Requiring Follow Up:    Patient Instructions:    Diet:    Activity:  For number of days (if applicable): Other Instructions:    Provider Follow-Up:   No follow-ups on file. Significant Diagnostic Studies:    Recent Labs:  Admission on 12/22/2021No results displayed because visit has over 200 results. ------------    Radiology last 7 days:  XR CHEST PORTABLEResult Date: 12/28/2021Status post intubation. There are bilateral alveolar opacities , infiltrates worrisome for viral COVID-19 pneumonia.   XR CHEST PORTABLEResult Date: 12/28/2021TIP OF ENDOTRACHEAL TUBE 10 CM SUPERIOR TO EMMANUEL AT [facility]PE[facility]. RECOMMEND ADVANCING ENDOTRACHEAL TUBE 8 CM. BILATERAL ATELECTASIS/PNEUMONIA. ABOVE FINDINGS DISCUSSED WITH PATIENT'S NURSE LORRI VIA TELEPHONE IN THE ICU AT 3:20 PM, TUESDAY, DECEMBER 28, 2021. US DUP LOWER EXTREMITIES BILATERAL VENOUSResult Date: 12/28/2021POSITIVE FOR THROMBUS WITHIN THE RIGHT POSTERIOR TIBIAL VEIN AND LEFT POSTERIOR TIBIAL VEIN     Pending Labs   Order Current Status  Blood gas, arterial Collected (12/24/21 0141)  Blood gas, arterial Collected (12/24/21 0141)  Blood gas, arterial Collected (12/24/21 0827)  Blood gas, arterial Collected (12/24/21 1513)  Blood gas, arterial Collected (12/25/21 0019)  Blood gas, arterial Collected (12/25/21 1657)  Blood gas, arterial Collected (12/26/21 1549)  Blood gas, arterial Collected (12/26/21 1549)  Blood gas, arterial Collected (12/26/21 1549)  Blood gas, arterial Collected (12/27/21 0757)  Blood gas, arterial Collected (12/27/21 0406)  Blood gas, arterial Collected (12/27/21 0757)  Blood gas, arterial Collected (12/27/21 1631)  Blood gas, arterial Collected (12/28/21 0435)  Blood gas, arterial Collected (12/28/21 1409)  Blood gas, arterial Collected (12/28/21 1717)  Blood gas, arterial Collected (12/29/21 0758)  Blood gas, arterial Collected (12/29/21 0820)  Blood gas, arterial Collected (12/29/21 2031)  Blood gas, arterial Collected (12/30/21 0005)  Blood gas, arterial Collected (12/30/21 1642)  CBC Auto Differential Collected (12/29/21 1051)  Comprehensive Metabolic Panel Collected (12/29/21 1051)  Culture, Respiratory Collected (12/25/21 1058)  D-Dimer, Quantitative Collected (12/29/21 1051)  D-Dimer, Quantitative Collected (12/30/21 0350)  Ferritin Collected (12/29/21 1051)  Ferritin In process  Ferritin In process      Discharge Medications    There are no discharge medications for this patient. There are no discharge medications for this patient.     There are no discharge medications for this patient. There are no discharge medications for this patient. Time Spent on Discharge:E] minutes were spent in patient examination, evaluation, counseling as well as medication reconciliation, prescriptions for required medications, discharge plan, and follow up.     Electronically signed by Juan Pickering MD on 1/2/22 at 3:09 PM EST   1452

## 2022-01-02 NOTE — PROGRESS NOTES
Critical Care Progress Note    2022 8:37 AM    Subjective:   Admit Date: 2021  PCP: Eric Santana MD    Chief Complaint   Patient presents with    Shortness of Breath     Interval History:  Has been intubated and sedated. PEEP is 10, FiO2 is 70%. Has been off Levophed. UO is 2900.         21: Continues to be about the same. FiO2 is 70%, PEEP is 10. Has been off Levophed as well. Currently sedated with fentanyl, propofol. Continues to be on IV heparin. Urine output is 2900 cc. 22: Continues to be on ventilator support. FiO2 70% and PEEP is 12. Off Levophed. Sedated with fentanyl propofol. Continues to be on IV heparin. No major changes overnight. 22: No major changes over last 24 hours. Continues to be on FiO2 70% and PEEP of 12. Sedated with propofol and fentanyl. Continues to be on anticoagulation. Overall not making any progress in the last few days.       Medications:   Scheduled Meds:   carboxymethylcellulose PF  2 drop Both Eyes TID    insulin lispro  0-12 Units SubCUTAneous Q6H    metoclopramide  10 mg IntraVENous Q8H    meropenem  500 mg IntraVENous Q24H    pantoprazole  40 mg IntraVENous BID    chlorhexidine  15 mL Mouth/Throat BID    sennosides-docusate sodium  2 tablet Oral BID    polyethylene glycol  17 g Oral Daily    sodium chloride flush  5-40 mL IntraVENous 2 times per day     Continuous Infusions:   heparin 25,000 units in 0.9% sodium chloride 250 mL infusion 14.4 Units/kg/hr (22 0805)    norepinephrine Stopped (21)    dextrose      sodium chloride 100 mL/hr at 21 0800    fentaNYL (SUBLIMAZE) infusion 200 mcg/hr (22 0421)    propofol 30 mcg/kg/min (22 0020)         Objective:   Vitals:   Temp (24hrs), Av.8 °F (36.6 °C), Min:97.6 °F (36.4 °C), Max:98.1 °F (36.7 °C)    BP (!) 166/60   Pulse 82   Temp 97.7 °F (36.5 °C) (Oral)   Resp (!) 34   Ht 5' 8\" (1.727 m)   Wt 267 lb 6.7 oz (121.3 kg) SpO2 95%   BMI 40.66 kg/m²   I/O:24HR INTAKE/OUTPUT:      Intake/Output Summary (Last 24 hours) at 1/2/2022 3602  Last data filed at 1/2/2022 0800  Gross per 24 hour   Intake 1075 ml   Output 300 ml   Net 775 ml     01/01 0701 - 01/02 0700  In: 4349 [I.V.:761]  Out: 300   CVP:         Ventilator Settings:  Vent Mode: AC/VC  Rate Set: 32 bmp   Vt Ordered: 500 mL       PEEP/CPAP: 12   FiO2 : 70 %     Physical Exam:  General appearance - ill appearing   Mental status - intubated and sedated. Eyes - pupils equal and reactive, extraocular eye movements intact  Nose - normal and patent  Mouth: ETT  Neck - supple, no significant adenopathy  Chest - diminished to auscultation B/L, no wheezes, rales or rhonchi, symmetric air entry  Heart - Normal rate, regular rhythm, normal S1, S2, no murmurs, rubs, clicks or gallops  Abdomen - soft, nontender, nondistended, no masses or organomegaly  Rectal - deferred, not clinically indicated  Neurological - sedated, no focal findings   Musculoskeletal - no joint tenderness, deformity or swelling  Extremities - peripheral pulses normal, no pedal edema, no clubbing or cyanosis  Skin - normal coloration and turgor, no rashes               BMP:    Recent Labs     12/31/21  0509 12/31/21  0736 01/01/22  0600 01/01/22  0805 01/02/22  0600 01/02/22  0759   *   < > 133*  --  131*  --    K 5.1*   < > 4.7  --  5.0*  --    CL 90*   < > 90*  --  87*  --    CO2 17*  --  18*  --  15*  --    *   < > 122*  --  156*  --    CREATININE 9.76*   < > 8.18*   < > 9.76* 11.2*   GLUCOSE 119*   < > 87  --  87  --     < > = values in this interval not displayed. .  MG:3,PHOS:3)@  Ionized Calcium: No results found for: IONCA  CBC:   Recent Labs     01/01/22  0600 01/01/22  0805 01/02/22  0600 01/02/22  0759   WBC 15.6*  --  15.0*  --    HGB 8.1*   < > 8.6* 7.8*     --  280  --     < > = values in this interval not displayed.       ABG: No results for input(s): PH, PCO2, PO2 in the last 72 hours.        Assessment and Plan:       Impression:    - Acute hypoxic and hypercapnic respiratory failure. Continues to be on mechanical ventilation with high ventilator settings. - Severe COVID-19 pneumonia  - Septic shock, has been off Levophed  - Combined metabolic and respiratory acidosis  - Acute kidney injury, currently on RRT  - Abnormal LFT  - Hyperkalemia, this has been stable. - Acute DVT, likely  PE. Recommendations:    - Continue close monitoring of hemodynamic and respiratory status in droplet plus isolation   - C/w vent bundle, wean off FiO2. Continue sedation. Serial ABGs. - Continue Steroids for 10 days. - did not receive Remdesivir due to SUREKHA and elevated LFTs  - Glycemic control, maintain FSG between 140-180 mg/dL   - continue anticoagulation.   - Trend inflammatory markers  - attempt to keep on dry side.   - RRT per nephrology   - strict I/O  Overall poor prognosis. Patient is not making any progress. Family might be leaning towards compassionate weaning. Prophylaxis:  Stress ulcer: [] PPI Agent [] H2RA [] Sucralfate [] Other:   VTE: [] Enoxaparin  [] SC Heparin  [] SCD      DNR-CCA      Excluding procedures, the total critical care time caring for this patient with life threatening, unstable organ failure, including direct patient contact, review of medical record, management of life support systems, review of data including imaging and labs, discussions with other team members, patient's family and physicians at least 31 minutes so far today.         Electronically signed by Cally Arroyo MD on 1/2/2022 at 8:37 AM

## 2022-01-03 LAB
FERRITIN: 1014 UG/L (ref 30–400)
FERRITIN: 1036 UG/L (ref 30–400)